# Patient Record
Sex: MALE | Race: WHITE | NOT HISPANIC OR LATINO | Employment: OTHER | URBAN - METROPOLITAN AREA
[De-identification: names, ages, dates, MRNs, and addresses within clinical notes are randomized per-mention and may not be internally consistent; named-entity substitution may affect disease eponyms.]

---

## 2017-11-10 ENCOUNTER — OFFICE VISIT (OUTPATIENT)
Dept: NEUROLOGY | Facility: MEDICAL CENTER | Age: 62
End: 2017-11-10
Payer: COMMERCIAL

## 2017-11-10 VITALS
RESPIRATION RATE: 16 BRPM | OXYGEN SATURATION: 93 % | WEIGHT: 264.2 LBS | HEART RATE: 72 BPM | SYSTOLIC BLOOD PRESSURE: 148 MMHG | HEIGHT: 68 IN | DIASTOLIC BLOOD PRESSURE: 90 MMHG | BODY MASS INDEX: 40.04 KG/M2

## 2017-11-10 DIAGNOSIS — R41.3 MEMORY PROBLEM: ICD-10-CM

## 2017-11-10 DIAGNOSIS — I10 ESSENTIAL HYPERTENSION: ICD-10-CM

## 2017-11-10 DIAGNOSIS — R68.89 SPELLS OF DECREASED ATTENTIVENESS: ICD-10-CM

## 2017-11-10 PROCEDURE — 99204 OFFICE O/P NEW MOD 45 MIN: CPT | Performed by: PSYCHIATRY & NEUROLOGY

## 2017-11-10 ASSESSMENT — PATIENT HEALTH QUESTIONNAIRE - PHQ9
SUM OF ALL RESPONSES TO PHQ QUESTIONS 1-9: 13
5. POOR APPETITE OR OVEREATING: 2 - MORE THAN HALF THE DAYS
CLINICAL INTERPRETATION OF PHQ2 SCORE: 3

## 2017-11-10 NOTE — PATIENT INSTRUCTIONS
Registration 3/3  Recall 2/3  Fluctuating of memory problems    IMPRESSION:    1. Poor memory for 15 years, progressively worsened  2. Spells of TIA like symptoms 1990+      PLAN/RECOMMENDATIONS:      We will offer MRI of brain , EEG and blood tests to look for the causes of memory problems  Such as subtle seizures      SIGNATURE:  oJse Gaming

## 2017-11-10 NOTE — PROGRESS NOTES
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  Progressive memory problems in the past 15 years   Chief Complaint   Patient presents with   • Establish Care     Memory issues       PRESENT ILLNESS:   1988-- Knee surgery-- worked in the assisted, fighting with a prisoner  After surgery, he noticed that his memory was poor-- could not read books without reading  TIA symptoms in mid 1990 -- fuzzy, eyes out of focus-- minutes to come out of it-- few times in a couple of weeks  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        PAST MEDICAL HISTORY:  Past Medical History:   Diagnosis Date   • Anesthesia     slow to wake up; ?apneic, needs encourangement to breathe   • High cholesterol    • Hyperlipidemia    • Hypertension    • Sleep apnea 2015    unable to use bipap right now due to sinus issue       PAST SURGICAL HISTORY:  Past Surgical History:   Procedure Laterality Date   • UMBILICAL HERNIA REPAIR  2/23/2015    Performed by John H Ganser, M.D. at [facility]   • OTHER ORTHOPEDIC SURGERY  2009    Right Knee Repair   • OTHER ORTHOPEDIC SURGERY  1988    Right Meniscus   • [id number]     • NASAL RECONSTRUCTION     •  "TONSILLECTOMY         FAMILY HISTORY:  Family History   Problem Relation Age of Onset   • Heart Attack Father    • Heart Disease Father        SOCIAL HISTORY:  Social History     Social History   • Marital status:      Spouse name: N/A   • Number of children: N/A   • Years of education: N/A     Occupational History   • Not on file.     Social History Main Topics   • Smoking status: Never Smoker   • Smokeless tobacco: Not on file   • Alcohol use Yes      Comment: 0-1 glasses of wine a week   • Drug use: No   • Sexual activity: Not on file     Other Topics Concern   • Not on file     Social History Narrative   • No narrative on file     ALLERGIES:  Allergies   Allergen Reactions   • Crestor [Rosuvastatin Calcium]      Severe side effects   • Hydrocodone      Gives patient insomnia, makes him \"crazy\"     TOBHX  History   Smoking Status   • Never Smoker   Smokeless Tobacco   • Not on file     ALCHX  History   Alcohol Use   • Yes     Comment: 0-1 glasses of wine a week     DRUGHX  History   Drug Use No           MEDICATIONS:  Current Outpatient Prescriptions   Medication   • oxycodone-acetaminophen (PERCOCET) 5-325 MG TABS   • levofloxacin (LEVAQUIN) 500 MG tablet   • omeprazole (PRILOSEC) 20 MG delayed-release capsule   • lisinopril (PRINIVIL) 10 MG TABS   • predniSONE (DELTASONE) 10 MG TABS   • montelukast (SINGULAIR) 10 MG TABS   • naproxen (NAPROSYN) 500 MG TABS   • niacin (NIASPAN) 1000 MG CR tablet   • Multiple Vitamin (MULTI VITAMIN MENS) TABS   • aspirin buffered (ASCRIPTIN) 325 MG TABS     No current facility-administered medications for this visit.        REVIEW OF SYSTEM:    Constitutional: Denies fevers, Denies weight changes   Eyes: Denies changes in vision, no eye pain   Ears/Nose/Throat/Mouth: Denies nasal congestion or sore throat   Cardiovascular: Denies chest pain or palpitations   Respiratory: Denies SOB.   Gastrointestinal/Hepatic: Denies abdominal pain, nausea, vomiting, diarrhea, constipation " "or GI bleeding   Genitourinary: Denies bladder dysfunction, dysuria or frequency   Musculoskeletal/Rheum: Denies joint pain and swelling   Skin/Breast: Denies rash, denies breast lumps or discharge   Neurological: TIA like spells, poor memory  Psychiatric: denies mood disorder   Endocrine: denies hx of diabetes or thyroid dysfunction   Heme/Oncology/Lymph Nodes: Denies enlarged lymph nodes, denies brusing or known bleeding disorder   Allergic/Immunologic: Denies hx of allergies         PHYSICAL AND NEUROLOGICAL EXMAINATIONS:  VITAL SIGNS: /90   Pulse 72   Resp 16   Ht 1.727 m (5' 8\")   Wt 119.8 kg (264 lb 3.2 oz)   SpO2 93%   BMI 40.17 kg/m²   CURRENT WEIGHT:   BMI: Body mass index is 40.17 kg/m².  PREVIOUS WEIGHTS:  Wt Readings from Last 25 Encounters:   11/10/17 119.8 kg (264 lb 3.2 oz)   02/23/15 108.9 kg (240 lb 1.3 oz)   01/31/13 116.6 kg (257 lb)   01/19/13 115 kg (253 lb 9.6 oz)   12/28/12 121.6 kg (268 lb)       General appearance of patient: WDWN(+) NAD(+)    EYES  o Fundus : Papilledem(-) Exudates(-) Hemorrhage(-)  Nervous System  Orientation to time, place and person(+)  Memory normal(-)  Language: aphasia(-)  Knowledge: past(+) Current(+)  Attention(+)  Cranial Nerves  • Nerve 2: intact  • Nerve 3,4,6: intact  • Nerve 5 : intact  • Nerve 7: intact  • Nerve 8: intact  • Nerve 9 & 10: intact  • Nerve 11: intact  • Nerve 12: intact  Muscle Power and muscle tone: symmetric, normal in upper and lower  Sensory System: Pin sensation intact(+)  Reflexes: symmetric throughout  Cerebellar Function FNP normal   Gait : Steady(+) TandemGait steady(+)  Heart and Vascular  Peripheral Vasucular system : Edema (-) Swelling(-)  RHB, Breathing sound clear  abdomen bowel sound normoactive  Extremities freely moveable  Joints no contracture       NEUROIMAGING: I reviewed the MRI/CT of brain     Registration 3/3  Recall 2/3  Fluctuating of memory problems    IMPRESSION:    1. Poor memory for 15 years, " progressively worsened  2. Spells of TIA like symptoms 1990+      PLAN/RECOMMENDATIONS:      We will offer MRI of brain , EEG and blood tests to look for the causes of memory problems  Such as subtle seizures      SIGNATURE:  Jose Gaming

## 2017-11-29 ENCOUNTER — HOSPITAL ENCOUNTER (OUTPATIENT)
Dept: RADIOLOGY | Facility: MEDICAL CENTER | Age: 62
End: 2017-11-29
Attending: PSYCHIATRY & NEUROLOGY
Payer: COMMERCIAL

## 2017-11-29 DIAGNOSIS — I10 ESSENTIAL HYPERTENSION: ICD-10-CM

## 2017-11-29 DIAGNOSIS — R41.3 MEMORY PROBLEM: ICD-10-CM

## 2017-11-29 DIAGNOSIS — R68.89 SPELLS OF DECREASED ATTENTIVENESS: ICD-10-CM

## 2017-11-29 PROCEDURE — 70551 MRI BRAIN STEM W/O DYE: CPT

## 2018-02-07 ENCOUNTER — HOSPITAL ENCOUNTER (OUTPATIENT)
Dept: LAB | Facility: MEDICAL CENTER | Age: 63
End: 2018-02-07
Attending: PSYCHIATRY & NEUROLOGY
Payer: COMMERCIAL

## 2018-02-07 DIAGNOSIS — R41.3 MEMORY PROBLEM: ICD-10-CM

## 2018-02-07 DIAGNOSIS — I10 ESSENTIAL HYPERTENSION: ICD-10-CM

## 2018-02-07 DIAGNOSIS — R68.89 SPELLS OF DECREASED ATTENTIVENESS: ICD-10-CM

## 2018-02-07 LAB
25(OH)D3 SERPL-MCNC: 11 NG/ML (ref 30–100)
CRP SERPL HS-MCNC: 0.17 MG/DL (ref 0–0.75)
ERYTHROCYTE [SEDIMENTATION RATE] IN BLOOD BY WESTERGREN METHOD: 16 MM/HOUR (ref 0–20)
RHEUMATOID FACT SER IA-ACNC: <10 IU/ML (ref 0–14)
THYROPEROXIDASE AB SERPL-ACNC: 1.4 IU/ML (ref 0–9)
VIT B12 SERPL-MCNC: 317 PG/ML (ref 211–911)

## 2018-02-07 PROCEDURE — 86225 DNA ANTIBODY NATIVE: CPT

## 2018-02-07 PROCEDURE — 86038 ANTINUCLEAR ANTIBODIES: CPT

## 2018-02-07 PROCEDURE — 82306 VITAMIN D 25 HYDROXY: CPT

## 2018-02-07 PROCEDURE — 36415 COLL VENOUS BLD VENIPUNCTURE: CPT

## 2018-02-07 PROCEDURE — 86140 C-REACTIVE PROTEIN: CPT

## 2018-02-07 PROCEDURE — 86800 THYROGLOBULIN ANTIBODY: CPT

## 2018-02-07 PROCEDURE — 82607 VITAMIN B-12: CPT

## 2018-02-07 PROCEDURE — 86235 NUCLEAR ANTIGEN ANTIBODY: CPT

## 2018-02-07 PROCEDURE — 83516 IMMUNOASSAY NONANTIBODY: CPT | Mod: 91

## 2018-02-07 PROCEDURE — 85652 RBC SED RATE AUTOMATED: CPT

## 2018-02-07 PROCEDURE — 86376 MICROSOMAL ANTIBODY EACH: CPT

## 2018-02-07 PROCEDURE — 86431 RHEUMATOID FACTOR QUANT: CPT

## 2018-02-08 LAB
CENTROMERE IGG TITR SER IF: 1 AU/ML (ref 0–40)
ENA JO1 AB TITR SER: 1 AU/ML (ref 0–40)
ENA SCL70 IGG SER QL: 13 AU/ML (ref 0–40)
ENA SM IGG SER-ACNC: 0 AU/ML (ref 0–40)
ENA SS-B IGG SER IA-ACNC: 0 AU/ML (ref 0–40)
NUCLEAR IGG SER QL IA: NORMAL
RIBOSOMAL P AB SER-ACNC: 5 AU/ML (ref 0–40)
SSA52 R0ENA AB IGG Q0420: 16 AU/ML (ref 0–40)
SSA60 R0ENA AB IGG Q0419: 2 AU/ML (ref 0–40)
U1 SNRNP IGG SER QL: 0 AU/ML (ref 0–40)

## 2018-02-09 LAB — THYROGLOB AB SERPL-ACNC: <0.9 IU/ML (ref 0–4)

## 2018-02-20 ENCOUNTER — TELEPHONE (OUTPATIENT)
Dept: NEUROLOGY | Facility: MEDICAL CENTER | Age: 63
End: 2018-02-20

## 2018-02-20 NOTE — TELEPHONE ENCOUNTER
Results for ANANDA KAUR (MRN [id number]) as of 2/20/2018 15:19   Ref. Range 2/7/2018 10:48   25-Hydroxy   Vitamin D 25 Latest Ref Range: 30 - 100 ng/mL 11 (L)       Due to Vit D deficiency, please take vit D-3   4000unit    daily

## 2018-03-07 ENCOUNTER — NON-PROVIDER VISIT (OUTPATIENT)
Dept: NEUROLOGY | Facility: MEDICAL CENTER | Age: 63
End: 2018-03-07
Payer: COMMERCIAL

## 2018-03-07 DIAGNOSIS — I10 ESSENTIAL HYPERTENSION: ICD-10-CM

## 2018-03-07 DIAGNOSIS — R68.89 SPELLS OF DECREASED ATTENTIVENESS: ICD-10-CM

## 2018-03-07 DIAGNOSIS — R41.3 MEMORY PROBLEM: ICD-10-CM

## 2018-03-07 PROCEDURE — 95819 EEG AWAKE AND ASLEEP: CPT | Performed by: PSYCHIATRY & NEUROLOGY

## 2018-03-12 ENCOUNTER — OFFICE VISIT (OUTPATIENT)
Dept: NEUROLOGY | Facility: MEDICAL CENTER | Age: 63
End: 2018-03-12
Payer: COMMERCIAL

## 2018-03-12 VITALS
WEIGHT: 267.86 LBS | RESPIRATION RATE: 16 BRPM | HEIGHT: 68 IN | DIASTOLIC BLOOD PRESSURE: 66 MMHG | SYSTOLIC BLOOD PRESSURE: 128 MMHG | TEMPERATURE: 98.1 F | BODY MASS INDEX: 40.6 KG/M2 | HEART RATE: 78 BPM | OXYGEN SATURATION: 93 %

## 2018-03-12 DIAGNOSIS — R68.89 SPELLS OF DECREASED ATTENTIVENESS: ICD-10-CM

## 2018-03-12 DIAGNOSIS — R41.3 MEMORY PROBLEM: ICD-10-CM

## 2018-03-12 PROCEDURE — 99214 OFFICE O/P EST MOD 30 MIN: CPT | Performed by: PSYCHIATRY & NEUROLOGY

## 2018-03-12 RX ORDER — LISINOPRIL 20 MG/1
40 TABLET ORAL DAILY
COMMUNITY
End: 2022-03-30

## 2018-03-12 RX ORDER — MEMANTINE HYDROCHLORIDE 5 MG/1
5 TABLET ORAL 2 TIMES DAILY
Qty: 60 TAB | Refills: 6 | Status: SHIPPED | OUTPATIENT
Start: 2018-03-12 | End: 2018-06-15 | Stop reason: SDUPTHER

## 2018-03-12 ASSESSMENT — PATIENT HEALTH QUESTIONNAIRE - PHQ9
CLINICAL INTERPRETATION OF PHQ2 SCORE: 3
SUM OF ALL RESPONSES TO PHQ QUESTIONS 1-9: 12
5. POOR APPETITE OR OVEREATING: 3 - NEARLY EVERY DAY

## 2018-03-12 NOTE — PROGRESS NOTES
ROUTINE ELECTROENCEPHALOGRAM REPORT      NAME: Yadiel Moody    REFERRING Dr:    INDICATION: 1. Poor memory for 15 years, progressively worsened  2. Spells of TIA like symptoms 1990+      TECHNIQUE: 30 channel routine electroencephalogram (EEG) was performed in accordance with the international 10-20 system. The study was reviewed in bipolar and referential montages. The recording examined the patient during wakeful and drowsy state(s).     DESCRIPTION OF THE RECORD:      Background rhythm during awake stage shows well-organized, well-developed, average voltage 10 to 11 hertz alpha activity in the posterior regions.  It blocks with eye opening and it is bilaterally synchronous and symmetrical.  No spike-and-wave discharges but there are delta/blunted sharp bursts more over left.  Photic stimulation did not produce any abnormalities. Stage II sleep was achieved.      ACTIVATION PROCEDURES:      Photic Stimulation were done    ICTAL AND/OR INTERICTAL FINDINGS:    No focal or generalized epileptiform activity noted.  No spike-and-wave discharges but there are delta/blunted sharp bursts more over left..  No clinical events or seizures were reported or recorded during the study.      EKG: sampling of the EKG recording demonstrated sinus rhythm.        INTERPRETATION:      ________________________________________________________________________    This scalp EEG is consistent with mild focal cortical dysfunction more over the left     This remains nonspecfic     If clinical suspicion of seizure remains high.  Prolonged outpatient EEG   monitoring may be of help.        ________________________________________________________________________

## 2018-03-12 NOTE — PROGRESS NOTES
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  Progressive memory problems in the past 15 years   Chief Complaint   Patient presents with   • Establish Care     Essntial hypertenson       PRESENT ILLNESS:   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-- 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, fighting with a prisoner  After surgery, he noticed that his memory was poor-- could not read books without reading  TIA symptoms in mid 1990 -- fuzzy, eyes out of focus-- minutes to come out of it-- few times in a couple of weeks  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        PAST MEDICAL HISTORY:  Past Medical History:   Diagnosis Date   • Anesthesia     slow to wake up; ?apneic, needs encourangement to breathe   • High cholesterol    • Hyperlipidemia    • Hypertension    • Sleep apnea 2015    unable to use bipap right now due to sinus issue       PAST SURGICAL HISTORY:  Past Surgical History:   Procedure Laterality Date   • UMBILICAL HERNIA REPAIR  2/23/2015    Performed by John H Ganser, M.D. at [facility]   • OTHER ORTHOPEDIC SURGERY  2009    Right Knee Repair   • OTHER ORTHOPEDIC SURGERY  1988    Right Meniscus   • [id number]     • NASAL RECONSTRUCTION     •  "TONSILLECTOMY         FAMILY HISTORY:  Family History   Problem Relation Age of Onset   • Heart Attack Father    • Heart Disease Father        SOCIAL HISTORY:  Social History     Social History   • Marital status:      Spouse name: N/A   • Number of children: N/A   • Years of education: N/A     Occupational History   • Not on file.     Social History Main Topics   • Smoking status: Never Smoker   • Smokeless tobacco: Not on file   • Alcohol use Yes      Comment: 0-1 glasses of wine a week   • Drug use: No   • Sexual activity: Not on file     Other Topics Concern   • Not on file     Social History Narrative   • No narrative on file     ALLERGIES:  Allergies   Allergen Reactions   • Crestor [Rosuvastatin Calcium]      Severe side effects   • Hydrocodone      Gives patient insomnia, makes him \"crazy\"     TOBHX  History   Smoking Status   • Never Smoker   Smokeless Tobacco   • Not on file     ALCHX  History   Alcohol Use   • Yes     Comment: 0-1 glasses of wine a week     DRUGHX  History   Drug Use No           MEDICATIONS:  Current Outpatient Prescriptions   Medication   • lisinopril (PRINIVIL) 20 MG Tab     No current facility-administered medications for this visit.        REVIEW OF SYSTEM:    Constitutional: Denies fevers, Denies weight changes   Eyes: Denies changes in vision, no eye pain   Ears/Nose/Throat/Mouth: Denies nasal congestion or sore throat   Cardiovascular: Denies chest pain or palpitations   Respiratory: Denies SOB.   Gastrointestinal/Hepatic: Denies abdominal pain, nausea, vomiting, diarrhea, constipation or GI bleeding   Genitourinary: Denies bladder dysfunction, dysuria or frequency   Musculoskeletal/Rheum: Denies joint pain and swelling   Skin/Breast: Denies rash, denies breast lumps or discharge   Neurological: TIA like spells, poor memory  Psychiatric: denies mood disorder   Endocrine: denies hx of diabetes or thyroid dysfunction   Heme/Oncology/Lymph Nodes: Denies enlarged lymph nodes, " "denies brusing or known bleeding disorder   Allergic/Immunologic: Denies hx of allergies         PHYSICAL AND NEUROLOGICAL EXMAINATIONS:  VITAL SIGNS: /66   Pulse 78   Temp 36.7 °C (98.1 °F)   Resp 16   Ht 1.727 m (5' 8\")   Wt 121.5 kg (267 lb 13.7 oz)   SpO2 93%   BMI 40.73 kg/m²   CURRENT WEIGHT:   BMI: Body mass index is 40.73 kg/m².  PREVIOUS WEIGHTS:  Wt Readings from Last 25 Encounters:   03/12/18 121.5 kg (267 lb 13.7 oz)   11/10/17 119.8 kg (264 lb 3.2 oz)   02/23/15 108.9 kg (240 lb 1.3 oz)   01/31/13 116.6 kg (257 lb)   01/19/13 115 kg (253 lb 9.6 oz)   12/28/12 121.6 kg (268 lb)       General appearance of patient: WDWN(+) NAD(+)    EYES  o Fundus : Papilledem(-) Exudates(-) Hemorrhage(-)  Nervous System  Orientation to time, place and person(+)  Memory normal(-)  Language: aphasia(-)  Knowledge: past(+) Current(+)  Attention(+)  Cranial Nerves  • Nerve 2: intact  • Nerve 3,4,6: intact  • Nerve 5 : intact  • Nerve 7: intact  • Nerve 8: intact  • Nerve 9 & 10: intact  • Nerve 11: intact  • Nerve 12: intact  Muscle Power and muscle tone: symmetric, normal in upper and lower  Sensory System: Pin sensation intact(+)  Reflexes: symmetric throughout  Cerebellar Function FNP normal   Gait : Steady(+) TandemGait steady(+)  Heart and Vascular  Peripheral Vasucular system : Edema (-) Swelling(-)  RHB, Breathing sound clear  abdomen bowel sound normoactive  Extremities freely moveable  Joints no contracture       NEUROIMAGING: I reviewed the MRI/CT of brain     Registration 3/3  Recall 2/3  Fluctuating of memory problems    IMPRESSION:    1. Poor memory for 15 years, progressively worsened  2. Spells of TIA like symptoms 1990+  3. Vit D deficiency      PLAN/RECOMMENDATIONS:    Based on the clinical presentation, MRI and EEG ( normal hippocampus and mild focal EEG abnormalities)    It is reasonable to try medication for memory problems and medication to regulate the brain activities    First of all, we " will try    Namenda 5mg two times per day for memory    The other medicine we may add are Aricept, Lamictal, etc    The rationale is to improve the memory and regulate the brain waves abnormalities        ________________________________________________________________________        Exercise muscle and brain    Weight loss    Quit alcohol altogether    Could try fasting 12 hours every other day    Treat sleep apnea--- re-try CPAP machine    Playing wind instruments might help the snoring ( strength the face/ neck muscles)    ________________________________________________________________________           We also discussed about the facial muscle exercise for snoring---  Like singing, like playing wind instruments like the following       ________________________________________________________________________         Reduce anxiety by praying, yoga, meditation and etc  ________________________________________________________________________        ________________________________________________________________________    Fish Oil -- Omega 3 1000mg 3# daily    Try Daily Probiotic too    Vit D-3 4000 unit daily  ________________________________________________________________________              Results for NATASHAANANDA (MRN [id number]) as of 3/12/2018 10:01   Ref. Range 2/7/2018 10:48   25-Hydroxy   Vitamin D 25 Latest Ref Range: 30 - 100 ng/mL 11 (L)         We will offer MRI of brain , EEG and blood tests to look for the causes of memory problems  Such as subtle seizures      SIGNATURE:  Jose Gaming      11/2017 MRI of brain - not remarkable      INTERPRETATION OF EEG:       ________________________________________________________________________     This scalp EEG is consistent with mild focal cortical dysfunction more over the left      This remains nonspecfic     If clinical suspicion of seizure remains high.  Prolonged outpatient EEG   monitoring may be of  help.           ________________________________________________________________________

## 2018-03-12 NOTE — PROCEDURES
DATE OF SERVICE:  03/07/2018    This is an outpatient EEG done on 03/07/2018.    ROUTINE ELECTROENCEPHALOGRAM REPORT        NAME: CoalYadiel Brando     REFERRING Dr:     INDICATION: 1. Poor memory for 15 years, progressively worsened  2. Spells of TIA like symptoms 1990+        TECHNIQUE: 30 channel routine electroencephalogram (EEG) was performed in accordance with the international 10-20 system. The study was reviewed in bipolar and referential montages. The recording examined the patient during wakeful and drowsy state(s).      DESCRIPTION OF THE RECORD:        Background rhythm during awake stage shows well-organized, well-developed, average voltage 10 to 11 hertz alpha activity in the posterior regions.  It blocks with eye opening and it is bilaterally synchronous and symmetrical.  No spike-and-wave discharges but there are delta/blunted sharp bursts more over left.  Photic stimulation did not produce any abnormalities. Stage II sleep was achieved.        ACTIVATION PROCEDURES:       Photic Stimulation were done     ICTAL AND/OR INTERICTAL FINDINGS:    No focal or generalized epileptiform activity noted.  No spike-and-wave discharges but there are delta/blunted sharp bursts more over left..  No clinical events or seizures were reported or recorded during the study.       EKG: sampling of the EKG recording demonstrated sinus rhythm.          INTERPRETATION:        ________________________________________________________________________     This scalp EEG is consistent with mild focal cortical dysfunction more over the left      This remains nonspecfic     If clinical suspicion of seizure remains high.  Prolonged outpatient EEG   monitoring may be of help.           ________________________________________________________________________                              ____________________________________     MD DIMAS BERNAL / JOCELINE    DD:  03/11/2018 23:26:29  DT:  03/11/2018 23:43:00    D#:  [id number]  Job#:   [id number]

## 2018-03-12 NOTE — PATIENT INSTRUCTIONS
IMPRESSION:    1. Poor memory for 15 years, progressively worsened  2. Spells of TIA like symptoms 1990+  3. Vit D deficiency      PLAN/RECOMMENDATIONS:    Based on the clinical presentation, MRI and EEG ( normal hippocampus and mild focal EEG abnormalities)    It is reasonable to try medication for memory problems and medication to regulate the brain activities    First of all, we will try    Namenda 5mg two times per day for memory    The other medicine we may add are Aricept, Lamictal, etc    The rationale is to improve the memory and regulate the brain waves abnormalities        ________________________________________________________________________        Exercise muscle and brain    Weight loss    Quit alcohol altogether    Could try fasting 12 hours every other day    Treat sleep apnea--- re-try CPAP machine    Playing wind instruments might help the snoring ( strength the face/ neck muscles)    ________________________________________________________________________           We also discussed about the facial muscle exercise for snoring---  Like singing, like playing wind instruments like the following       ________________________________________________________________________         Reduce anxiety by praying, yoga, meditation and etc  ________________________________________________________________________        ________________________________________________________________________    Fish Oil -- Omega 3 1000mg 3# daily    Try Daily Probiotic too    Vit D-3 4000 unit daily  ________________________________________________________________________              Results for ANANDA KAUR (MRN [id number]) as of 3/12/2018 10:01   Ref. Range 2/7/2018 10:48   25-Hydroxy   Vitamin D 25 Latest Ref Range: 30 - 100 ng/mL 11 (L)         We will offer MRI of brain , EEG and blood tests to look for the causes of memory problems  Such as subtle seizures      SIGNATURE:  Jose Gaming      11/2017 MRI of brain -  not remarkable      INTERPRETATION OF EEG:       ________________________________________________________________________     This scalp EEG is consistent with mild focal cortical dysfunction more over the left      This remains nonspecfic     If clinical suspicion of seizure remains high.  Prolonged outpatient EEG   monitoring may be of help.           ________________________________________________________________________

## 2018-06-18 NOTE — TELEPHONE ENCOUNTER
Called 90 day supply  into [facility] at patient request. Left message he will need follow up before next refill.

## 2018-06-19 RX ORDER — MEMANTINE HYDROCHLORIDE 5 MG/1
TABLET ORAL
Qty: 180 TAB | Refills: 0 | Status: SHIPPED | OUTPATIENT
Start: 2018-06-19 | End: 2018-10-15 | Stop reason: SDUPTHER

## 2018-10-15 ENCOUNTER — OFFICE VISIT (OUTPATIENT)
Dept: NEUROLOGY | Facility: MEDICAL CENTER | Age: 63
End: 2018-10-15
Payer: COMMERCIAL

## 2018-10-15 VITALS
DIASTOLIC BLOOD PRESSURE: 82 MMHG | HEART RATE: 84 BPM | WEIGHT: 262 LBS | SYSTOLIC BLOOD PRESSURE: 130 MMHG | OXYGEN SATURATION: 92 % | TEMPERATURE: 98.6 F | BODY MASS INDEX: 39.71 KG/M2 | HEIGHT: 68 IN

## 2018-10-15 DIAGNOSIS — R41.3 MEMORY PROBLEM: ICD-10-CM

## 2018-10-15 DIAGNOSIS — R68.89 SPELLS OF DECREASED ATTENTIVENESS: ICD-10-CM

## 2018-10-15 PROCEDURE — 99214 OFFICE O/P EST MOD 30 MIN: CPT | Performed by: PSYCHIATRY & NEUROLOGY

## 2018-10-15 RX ORDER — OMEPRAZOLE 20 MG/1
20 CAPSULE, DELAYED RELEASE ORAL DAILY
COMMUNITY

## 2018-10-15 RX ORDER — LAMOTRIGINE 25 MG/1
25 TABLET ORAL 2 TIMES DAILY
Qty: 60 TAB | Refills: 5 | Status: SHIPPED | OUTPATIENT
Start: 2018-10-15 | End: 2018-10-15 | Stop reason: SDUPTHER

## 2018-10-15 RX ORDER — MEMANTINE HYDROCHLORIDE 10 MG/1
10 TABLET ORAL 2 TIMES DAILY
Qty: 180 TAB | Refills: 1 | Status: SHIPPED | OUTPATIENT
Start: 2018-10-15 | End: 2019-04-17 | Stop reason: SDUPTHER

## 2018-10-15 NOTE — PROGRESS NOTES
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 [employer]  Progressive memory problems in the past 15 years   Chief Complaint   Patient presents with   • Follow-Up     memory issues       PRESENT ILLNESS:   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-- Knee surgery-- worked in the assisted, fighting with a prisoner  After surgery, he noticed that his memory was poor-- could not understand books without reading multiple times  TIA symptoms in mid 1990 -- fuzzy, eyes out of focus-- minutes to come out of it-- few times in a couple of weeks  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        PAST MEDICAL HISTORY:  Past Medical History:   Diagnosis Date   • Anesthesia     slow to wake up; ?apneic, needs encourangement to breathe   • High cholesterol    • Hyperlipidemia    • Hypertension    • Sleep apnea 2015    unable to use bipap right now due to sinus issue       PAST SURGICAL HISTORY:  Past Surgical History:   Procedure Laterality Date   • UMBILICAL HERNIA REPAIR  2/23/2015    Performed by John H Ganser, M.D. at [facility]   • OTHER ORTHOPEDIC SURGERY  2009    Right Knee Repair   • OTHER ORTHOPEDIC SURGERY  1988    Right Meniscus   • [id number]     •  "NASAL RECONSTRUCTION     • TONSILLECTOMY         FAMILY HISTORY:  Family History   Problem Relation Age of Onset   • Heart Attack Father    • Heart Disease Father        SOCIAL HISTORY:  Social History     Social History   • Marital status:      Spouse name: N/A   • Number of children: N/A   • Years of education: N/A     Occupational History   • Not on file.     Social History Main Topics   • Smoking status: Never Smoker   • Smokeless tobacco: Never Used   • Alcohol use Yes      Comment: 0-1 glasses of wine a week   • Drug use: No   • Sexual activity: Not on file     Other Topics Concern   • Not on file     Social History Narrative   • No narrative on file     ALLERGIES:  Allergies   Allergen Reactions   • Crestor [Rosuvastatin Calcium]      Severe side effects   • Hydrocodone      Gives patient insomnia, makes him \"crazy\"     TOBHX  History   Smoking Status   • Never Smoker   Smokeless Tobacco   • Never Used     ALCHX  History   Alcohol Use   • Yes     Comment: 0-1 glasses of wine a week     DRUGHX  History   Drug Use No           MEDICATIONS:  Current Outpatient Prescriptions   Medication   • omeprazole (PRILOSEC) 20 MG delayed-release capsule   • vitamin D (CHOLECALCIFEROL) 1000 UNIT Tab   • memantine (NAMENDA) 5 MG Tab   • lisinopril (PRINIVIL) 20 MG Tab     No current facility-administered medications for this visit.        REVIEW OF SYSTEM:    Constitutional: Denies fevers, Denies weight changes   Eyes: Denies changes in vision, no eye pain   Ears/Nose/Throat/Mouth: Denies nasal congestion or sore throat   Cardiovascular: Denies chest pain or palpitations   Respiratory: Denies SOB.   Gastrointestinal/Hepatic: Denies abdominal pain, nausea, vomiting, diarrhea, constipation or GI bleeding   Genitourinary: Denies bladder dysfunction, dysuria or frequency   Musculoskeletal/Rheum: Denies joint pain and swelling   Skin/Breast: Denies rash, denies breast lumps or discharge   Neurological: TIA like spells, poor " "memory  Psychiatric: denies mood disorder   Endocrine: denies hx of diabetes or thyroid dysfunction   Heme/Oncology/Lymph Nodes: Denies enlarged lymph nodes, denies brusing or known bleeding disorder   Allergic/Immunologic: Denies hx of allergies         PHYSICAL AND NEUROLOGICAL EXMAINATIONS:  VITAL SIGNS: /82 (BP Location: Left arm, Patient Position: Sitting, BP Cuff Size: Adult)   Pulse 84   Temp 37 °C (98.6 °F) (Temporal)   Ht 1.727 m (5' 8\")   Wt 118.8 kg (262 lb)   SpO2 92%   BMI 39.84 kg/m²   CURRENT WEIGHT:   BMI: Body mass index is 39.84 kg/m².  PREVIOUS WEIGHTS:  Wt Readings from Last 25 Encounters:   10/15/18 118.8 kg (262 lb)   03/12/18 121.5 kg (267 lb 13.7 oz)   11/10/17 119.8 kg (264 lb 3.2 oz)   02/23/15 108.9 kg (240 lb 1.3 oz)   01/31/13 116.6 kg (257 lb)   01/19/13 115 kg (253 lb 9.6 oz)   12/28/12 121.6 kg (268 lb)       General appearance of patient: WDWN(+) NAD(+)    EYES  o Fundus : Papilledem(-) Exudates(-) Hemorrhage(-)  Nervous System  Orientation to time, place and person(+)  Memory normal(-)      ________________________________________________________________________      We also asked the patient to copy the pentagons, draw clocks, writing  The patient's work will be scanned into the media section    ________________________________________________________________________    Language: aphasia(-)  Knowledge: past(+) Current(+)  Attention(+)  Cranial Nerves  • Nerve 2: intact  • Nerve 3,4,6: intact  • Nerve 5 : intact  • Nerve 7: intact  • Nerve 8: intact  • Nerve 9 & 10: intact  • Nerve 11: intact  • Nerve 12: intact  Muscle Power and muscle tone: symmetric, normal in upper and lower  Sensory System: Pin sensation intact(+)  Reflexes: symmetric throughout  Cerebellar Function FNP normal   Gait : Steady(+) TandemGait steady(+)  Heart and Vascular  Peripheral Vasucular system : Edema (-) Swelling(-)  RHB, Breathing sound clear  abdomen bowel sound normoactive  Extremities " freely moveable  Joints no contracture       NEUROIMAGING: I reviewed the MRI/CT of brain     Registration 3/3  Recall 3/3  Fluctuating of memory problems    IMPRESSION:    1. Poor memory since 2003, progressively worsened  2. Spells of TIA like symptoms 1990+  3. Vit D deficiency  4. Sleep Apnea--  Could Not tolerate BiPaP      PLAN/RECOMMENDATIONS:    Based on the clinical presentation, MRI and EEG ( normal hippocampus and mild focal EEG abnormalities)    ________________________________________________________________________      Up the Namenda 10mg two times per day for memory  This time, we will add Lamictal   Lamictal 25mg daily for 2 weeks, then up to 25mg two times per day since the third week  Please stop if any side effects  Please notify us if not helpful    The rationale is to improve the memory and regulate the brain waves abnormalities  ________________________________________________________________________          ________________________________________________________________________        Exercise muscle and brain    Weight loss    Quit alcohol altogether    Could try fasting 12 hours every other day    Treat sleep apnea--- re-try CPAP machine? Could not tolerate CPAP  We also discuss about the depression issue  May benefit by talking with psychologist or psychiatrist    Playing wind instruments might help the snoring ( strength the face/ neck muscles)    ________________________________________________________________________         Reduce anxiety by praying, yoga, meditation and etc  ________________________________________________________________________          Results for ANANDA KAUR (MRN [id number]) as of 3/12/2018 10:01   Ref. Range 2/7/2018 10:48   25-Hydroxy   Vitamin D 25 Latest Ref Range: 30 - 100 ng/mL 11 (L)           SIGNATURE:  Jose Gaming      11/2017 MRI of brain - not remarkable      INTERPRETATION OF  EEG:       ________________________________________________________________________     This scalp EEG is consistent with mild focal cortical dysfunction more over the left      This remains nonspecfic     If clinical suspicion of seizure remains high.  Prolonged outpatient EEG   monitoring may be of help.           ________________________________________________________________________

## 2018-10-15 NOTE — PATIENT INSTRUCTIONS
IMPRESSION:    1. Poor memory since 2003, progressively worsened  2. Spells of TIA like symptoms 1990+  3. Vit D deficiency  4. Sleep Apnea--  Could Not tolerate BiPaP      PLAN/RECOMMENDATIONS:    Based on the clinical presentation, MRI and EEG ( normal hippocampus and mild focal EEG abnormalities)    ________________________________________________________________________      Up the Namenda 10mg two times per day for memory  This time, we will add Lamictal   Lamictal 25mg daily for 2 weeks, then up to 25mg two times per day since the third week  Please stop if any side effects  Please notify us if not helpful    The rationale is to improve the memory and regulate the brain waves abnormalities  ________________________________________________________________________          ________________________________________________________________________        Exercise muscle and brain    Weight loss    Quit alcohol altogether    Could try fasting 12 hours every other day    Treat sleep apnea--- re-try CPAP machine? Could not tolerate CPAP  We also discuss about the depression issue  May benefit by talking with psychologist or psychiatrist    Playing wind instruments might help the snoring ( strength the face/ neck muscles)    ________________________________________________________________________         Reduce anxiety by praying, yoga, meditation and etc  ________________________________________________________________________          Results for ANANDA KAUR (MRN [id number]) as of 3/12/2018 10:01   Ref. Range 2/7/2018 10:48   25-Hydroxy   Vitamin D 25 Latest Ref Range: 30 - 100 ng/mL 11 (L)           SIGNATURE:  Jose Gaming      11/2017 MRI of brain - not remarkable

## 2019-04-17 ENCOUNTER — OFFICE VISIT (OUTPATIENT)
Dept: NEUROLOGY | Facility: MEDICAL CENTER | Age: 64
End: 2019-04-17
Payer: COMMERCIAL

## 2019-04-17 VITALS
WEIGHT: 276 LBS | DIASTOLIC BLOOD PRESSURE: 66 MMHG | HEART RATE: 73 BPM | BODY MASS INDEX: 41.83 KG/M2 | OXYGEN SATURATION: 92 % | TEMPERATURE: 98.1 F | HEIGHT: 68 IN | SYSTOLIC BLOOD PRESSURE: 124 MMHG

## 2019-04-17 DIAGNOSIS — G47.33 OBSTRUCTIVE SLEEP APNEA SYNDROME: ICD-10-CM

## 2019-04-17 DIAGNOSIS — G47.39 OTHER SLEEP APNEA: ICD-10-CM

## 2019-04-17 DIAGNOSIS — R41.3 MEMORY PROBLEM: ICD-10-CM

## 2019-04-17 PROCEDURE — 99214 OFFICE O/P EST MOD 30 MIN: CPT | Performed by: PSYCHIATRY & NEUROLOGY

## 2019-04-17 RX ORDER — MEMANTINE HYDROCHLORIDE 10 MG/1
10 TABLET ORAL 2 TIMES DAILY
Qty: 180 TAB | Refills: 2 | Status: SHIPPED | OUTPATIENT
Start: 2019-04-17 | End: 2020-10-24 | Stop reason: SDUPTHER

## 2019-04-17 ASSESSMENT — PATIENT HEALTH QUESTIONNAIRE - PHQ9: CLINICAL INTERPRETATION OF PHQ2 SCORE: 0

## 2019-04-17 NOTE — PROGRESS NOTES
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  Progressive memory problems in the past 15 years     Overnight O2 study showed SAO2 as low as 62%  Chief Complaint   Patient presents with   • Follow-Up     Memory problem       PRESENT ILLNESS:   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, fighting with a prisoner  After surgery, he noticed that his memory was poor-- could not understand books without reading multiple times  TIA symptoms in mid 1990 -- fuzzy, eyes out of focus-- minutes to come out of it-- few times in a couple of weeks  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        PAST MEDICAL HISTORY:  Past Medical History:   Diagnosis Date   • Anesthesia     slow to wake up; ?apneic, needs encourangement to breathe   • High cholesterol    • Hyperlipidemia    • Hypertension    • Sleep apnea 2015    unable to use bipap right now due to sinus issue       PAST SURGICAL HISTORY:  Past Surgical History:   Procedure Laterality Date   • UMBILICAL HERNIA REPAIR  2/23/2015    Performed by John H Ganser, M.D. at [facility]   • OTHER ORTHOPEDIC SURGERY  2009  "   Right Knee Repair   • OTHER ORTHOPEDIC SURGERY  1988    Right Meniscus   • [id number]     • NASAL RECONSTRUCTION     • TONSILLECTOMY         FAMILY HISTORY:  Family History   Problem Relation Age of Onset   • Heart Attack Father    • Heart Disease Father        SOCIAL HISTORY:  Social History     Social History   • Marital status:      Spouse name: N/A   • Number of children: N/A   • Years of education: N/A     Occupational History   • Not on file.     Social History Main Topics   • Smoking status: Never Smoker   • Smokeless tobacco: Never Used   • Alcohol use Yes      Comment: 0-1 glasses of wine a week   • Drug use: No   • Sexual activity: Not on file     Other Topics Concern   • Not on file     Social History Narrative   • No narrative on file     ALLERGIES:  Allergies   Allergen Reactions   • Crestor [Rosuvastatin Calcium]      Severe side effects   • Hydrocodone      Gives patient insomnia, makes him \"crazy\"     TOBHX  History   Smoking Status   • Never Smoker   Smokeless Tobacco   • Never Used     ALCHX  History   Alcohol Use   • Yes     Comment: 0-1 glasses of wine a week     DRUGHX  History   Drug Use No           MEDICATIONS:  Current Outpatient Prescriptions   Medication   • lamoTRIgine (LAMICTAL) 25 MG Tab   • omeprazole (PRILOSEC) 20 MG delayed-release capsule   • vitamin D (CHOLECALCIFEROL) 1000 UNIT Tab   • memantine (NAMENDA) 10 MG Tab   • lisinopril (PRINIVIL) 20 MG Tab   • lamoTRIgine (LAMICTAL) 25 MG Tab     No current facility-administered medications for this visit.        REVIEW OF SYSTEM:    Constitutional: Denies fevers, Denies weight changes   Eyes: Denies changes in vision, no eye pain   Ears/Nose/Throat/Mouth: Denies nasal congestion or sore throat   Cardiovascular: Denies chest pain or palpitations   Respiratory: Denies SOB.   Gastrointestinal/Hepatic: Denies abdominal pain, nausea, vomiting, diarrhea, constipation or GI bleeding   Genitourinary: Denies bladder dysfunction, dysuria " "or frequency   Musculoskeletal/Rheum: Denies joint pain and swelling   Skin/Breast: Denies rash, denies breast lumps or discharge   Neurological: TIA like spells, poor memory  Psychiatric: denies mood disorder   Endocrine: denies hx of diabetes or thyroid dysfunction   Heme/Oncology/Lymph Nodes: Denies enlarged lymph nodes, denies brusing or known bleeding disorder   Allergic/Immunologic: Denies hx of allergies         PHYSICAL AND NEUROLOGICAL EXMAINATIONS:  VITAL SIGNS: /66   Pulse 73   Temp 36.7 °C (98.1 °F) (Temporal)   Ht 1.727 m (5' 8\")   Wt (!) 125.2 kg (276 lb)   SpO2 92%   BMI 41.97 kg/m²   CURRENT WEIGHT:   BMI: Body mass index is 41.97 kg/m².  PREVIOUS WEIGHTS:  Wt Readings from Last 25 Encounters:   04/17/19 (!) 125.2 kg (276 lb)   10/15/18 118.8 kg (262 lb)   03/12/18 121.5 kg (267 lb 13.7 oz)   11/10/17 119.8 kg (264 lb 3.2 oz)   02/23/15 108.9 kg (240 lb 1.3 oz)   01/31/13 116.6 kg (257 lb)   01/19/13 115 kg (253 lb 9.6 oz)   12/28/12 121.6 kg (268 lb)       General appearance of patient: WDWN(+) NAD(+)    EYES  o Fundus : Papilledem(-) Exudates(-) Hemorrhage(-)  Nervous System  Orientation to time, place and person(+)  Memory normal(-)      ________________________________________________________________________      We also asked the patient to copy the pentagons, draw clocks, writing  The patient's work will be scanned into the media section    ________________________________________________________________________    Language: aphasia(-)  Knowledge: past(+) Current(+)  Attention(+)  Cranial Nerves  • Nerve 2: intact  • Nerve 3,4,6: intact  • Nerve 5 : intact  • Nerve 7: intact  • Nerve 8: intact  • Nerve 9 & 10: intact  • Nerve 11: intact  • Nerve 12: intact  Muscle Power and muscle tone: symmetric, normal in upper and lower  Sensory System: Pin sensation intact(+)  Reflexes: symmetric throughout  Cerebellar Function FNP normal   Gait : Steady(+) TandemGait steady(+)  Heart and " Vascular  Peripheral Vasucular system : Edema (-) Swelling(-)  RHB, Breathing sound clear  abdomen bowel sound normoactive  Extremities freely moveable  Joints no contracture       NEUROIMAGING: I reviewed the MRI/CT of brain     Registration 3/3  Recall 3/3  Fluctuating of memory problems    IMPRESSION:    1. Poor memory since 2003, progressively worsened  2. Spells of TIA like symptoms 1990+  3. Vit D deficiency  4. Sleep Apnea--  Could Not tolerate BiPaP-- nocturnal O2 as low as 62%-- could not tolerate BiPaP because of sinus problems  5. Overweight      PLAN/RECOMMENDATIONS:    Based on the clinical presentation, MRI and EEG ( normal hippocampus and mild focal EEG abnormalities)--we could re-start trial of anti-seizure medication  But now, I would prefer to focus on sleep apnea and nocturnal hypoxia    We will refer the patient to pulmonologist for sleep apnea management  Nocturnal hypoxia is one of the cause of reversible memory problems  ________________________________________________________________________      Continue Namenda 10mg two times per day for memory  This time, we will stop Lamictal     ________________________________________________________________________      The patient has not talked to pulmonologist  The patient is going to see ENT  The patient got his ambulatory sleep study done in [facility]     ________________________________________________________________________        Exercise muscle and brain    Weight loss    Quit alcohol altogether    Could try fasting 12 hours every other day    Treat sleep apnea--- re-try CPAP machine? Could not tolerate CPAP  We also discuss about the depression issue  May benefit by talking with psychologist or psychiatrist    Playing wind instruments might help the snoring ( strength the face/ neck muscles)    ________________________________________________________________________         Reduce anxiety by praying, yoga, meditation  and etc  ________________________________________________________________________          Results for NATASHAANANDA FINCH (MRN [id number]) as of 3/12/2018 10:01   Ref. Range 2/7/2018 10:48   25-Hydroxy   Vitamin D 25 Latest Ref Range: 30 - 100 ng/mL 11 (L)           SIGNATURE:  Jose Gaming      11/2017 MRI of brain - not remarkable      INTERPRETATION OF EEG:       ________________________________________________________________________     This scalp EEG is consistent with mild focal cortical dysfunction more over the left      This remains nonspecfic     If clinical suspicion of seizure remains high.  Prolonged outpatient EEG   monitoring may be of help.           ________________________________________________________________________

## 2019-04-17 NOTE — PATIENT INSTRUCTIONS
IMPRESSION:    1. Poor memory since 2003, progressively worsened  2. Spells of TIA like symptoms 1990+  3. Vit D deficiency  4. Sleep Apnea--  Could Not tolerate BiPaP-- nocturnal O2 as low as 62%-- could not tolerate BiPaP because of sinus problems  5. Overweight      PLAN/RECOMMENDATIONS:    Based on the clinical presentation, MRI and EEG ( normal hippocampus and mild focal EEG abnormalities)--we could re-start trial of anti-seizure medication  But now, I would prefer to focus on sleep apnea and nocturnal hypoxia    We will refer the patient to pulmonologist for sleep apnea management  Nocturnal hypoxia is one of the cause of reversible memory problems  ________________________________________________________________________      Continue Namenda 10mg two times per day for memory  This time, we will stop Lamictal     ________________________________________________________________________      The patient has not talked to pulmonologist  The patient is going to see ENT  The patient got his ambulatory sleep study done in [facility]     ________________________________________________________________________        Exercise muscle and brain    Weight loss    Quit alcohol altogether    Could try fasting 12 hours every other day    Treat sleep apnea--- re-try CPAP machine? Could not tolerate CPAP  We also discuss about the depression issue  May benefit by talking with psychologist or psychiatrist    Playing wind instruments might help the snoring ( strength the face/ neck muscles)    ________________________________________________________________________         Reduce anxiety by praying, yoga, meditation and etc  ________________________________________________________________________

## 2019-06-26 ENCOUNTER — SLEEP CENTER VISIT (OUTPATIENT)
Dept: SLEEP MEDICINE | Facility: MEDICAL CENTER | Age: 64
End: 2019-06-26
Payer: COMMERCIAL

## 2019-06-26 VITALS
HEART RATE: 72 BPM | SYSTOLIC BLOOD PRESSURE: 124 MMHG | WEIGHT: 276 LBS | BODY MASS INDEX: 40.88 KG/M2 | HEIGHT: 69 IN | DIASTOLIC BLOOD PRESSURE: 82 MMHG | OXYGEN SATURATION: 92 % | RESPIRATION RATE: 15 BRPM

## 2019-06-26 DIAGNOSIS — G47.33 OSA (OBSTRUCTIVE SLEEP APNEA): ICD-10-CM

## 2019-06-26 DIAGNOSIS — E66.01 MORBID OBESITY WITH BMI OF 40.0-44.9, ADULT (HCC): ICD-10-CM

## 2019-06-26 PROCEDURE — 99203 OFFICE O/P NEW LOW 30 MIN: CPT | Performed by: FAMILY MEDICINE

## 2019-06-26 RX ORDER — EZETIMIBE 10 MG/1
TABLET ORAL
Refills: 5 | COMMUNITY
Start: 2019-06-15 | End: 2023-05-22

## 2019-06-26 NOTE — PATIENT INSTRUCTIONS
Sleep Apnea  Sleep apnea is a condition in which breathing pauses or becomes shallow during sleep. Episodes of sleep apnea usually last 10 seconds or longer, and they may occur as many as 20 times an hour. Sleep apnea disrupts your sleep and keeps your body from getting the rest that it needs. This condition can increase your risk of certain health problems, including:  · Heart attack.  · Stroke.  · Obesity.  · Diabetes.  · Heart failure.  · Irregular heartbeat.  There are three kinds of sleep apnea:  · Obstructive sleep apnea. This kind is caused by a blocked or collapsed airway.  · Central sleep apnea. This kind happens when the part of the brain that controls breathing does not send the correct signals to the muscles that control breathing.  · Mixed sleep apnea. This is a combination of obstructive and central sleep apnea.  What are the causes?  The most common cause of this condition is a collapsed or blocked airway. An airway can collapse or become blocked if:  · Your throat muscles are abnormally relaxed.  · Your tongue and tonsils are larger than normal.  · You are overweight.  · Your airway is smaller than normal.  What increases the risk?  This condition is more likely to develop in people who:  · Are overweight.  · Smoke.  · Have a smaller than normal airway.  · Are elderly.  · Are male.  · Drink alcohol.  · Take sedatives or tranquilizers.  · Have a family history of sleep apnea.  What are the signs or symptoms?  Symptoms of this condition include:  · Trouble staying asleep.  · Daytime sleepiness and tiredness.  · Irritability.  · Loud snoring.  · Morning headaches.  · Trouble concentrating.  · Forgetfulness.  · Decreased interest in sex.  · Unexplained sleepiness.  · Mood swings.  · Personality changes.  · Feelings of depression.  · Waking up often during the night to urinate.  · Dry mouth.  · Sore throat.  How is this diagnosed?  This condition may be diagnosed with:  · A medical history.  · A  physical exam.  · A series of tests that are done while you are sleeping (sleep study). These tests are usually done in a sleep lab, but they may also be done at home.  How is this treated?  Treatment for this condition aims to restore normal breathing and to ease symptoms during sleep. It may involve managing health issues that can affect breathing, such as high blood pressure or obesity. Treatment may include:  · Sleeping on your side.  · Using a decongestant if you have nasal congestion.  · Avoiding the use of depressants, including alcohol, sedatives, and narcotics.  · Losing weight if you are overweight.  · Making changes to your diet.  · Quitting smoking.  · Using a device to open your airway while you sleep, such as:  ¨ An oral appliance. This is a custom-made mouthpiece that shifts your lower jaw forward.  ¨ A continuous positive airway pressure (CPAP) device. This device delivers oxygen to your airway through a mask.  ¨ A nasal expiratory positive airway pressure (EPAP) device. This device has valves that you put into each nostril.  ¨ A bi-level positive airway pressure (BPAP) device. This device delivers oxygen to your airway through a mask.  · Surgery if other treatments do not work. During surgery, excess tissue is removed to create a wider airway.  It is important to get treatment for sleep apnea. Without treatment, this condition can lead to:  · High blood pressure.  · Coronary artery disease.  · (Men) An inability to achieve or maintain an erection (impotence).  · Reduced thinking abilities.  Follow these instructions at home:  · Make any lifestyle changes that your health care provider recommends.  · Eat a healthy, well-balanced diet.  · Take over-the-counter and prescription medicines only as told by your health care provider.  · Avoid using depressants, including alcohol, sedatives, and narcotics.  · Take steps to lose weight if you are overweight.  · If you were given a device to open your airway  while you sleep, use it only as told by your health care provider.  · Do not use any tobacco products, such as cigarettes, chewing tobacco, and e-cigarettes. If you need help quitting, ask your health care provider.  · Keep all follow-up visits as told by your health care provider. This is important.  Contact a health care provider if:  · The device that you received to open your airway during sleep is uncomfortable or does not seem to be working.  · Your symptoms do not improve.  · Your symptoms get worse.  Get help right away if:  · You develop chest pain.  · You develop shortness of breath.  · You develop discomfort in your back, arms, or stomach.  · You have trouble speaking.  · You have weakness on one side of your body.  · You have drooping in your face.  These symptoms may represent a serious problem that is an emergency. Do not wait to see if the symptoms will go away. Get medical help right away. Call your local emergency services (911 in the [place] Do not drive yourself to the hospital.   This information is not intended to replace advice given to you by your health care provider. Make sure you discuss any questions you have with your health care provider.  Document Released: 12/08/2003 Document Revised: 08/13/2017 Document Reviewed: 09/26/2016  [employer] Interactive Patient Education © 2017 [employer]      PLAN  - restart CPAP with full face mask. If issues with the CPAP or pressure stop by at sleep center for compliance download and pressure adjustment  - Discuss balloon sinuplasty with ENT  - Re establish care with the allergist

## 2019-06-26 NOTE — PROGRESS NOTES
"     [facility]  Consult Note     Date: 6/26/2019 / Time: 1:53 PM    Patient ID:   Name:             Yadiel Moody   YOB: 1955  Age:                 63 y.o.  male   MRN:               [id number]      Thank you for requesting a sleep medicine consultation on Yadiel Moody at the sleep center. He presents today with the chief complaints of snoring, apneas and occasional excessive daytime sleepiness. He is referred by Dr. Gaming for evaluation and treatment of sleep disorder breathing. He had a HS on 2/20/19 by an outlying facility which showed severe NARCISO with AHI of 33.5/hr and O2 caryn 62%. He has Auto CPAP however unsure about the pressure. He has not been able to use the CPAP due to congestion and post nasal drip and feeling suffocated.     HISTORY OF PRESENT ILLNESS:       At night,  Yadiel Moody goes to bed around 12 am on weekdays and on the weekends. He gets out of bed at 8 am on weekdays and on the weekends.  He averages about 7-8 hrs of sleep on a good night. Pt has bad nights about 1 nights per week. He falls asleep within 5 minutes. He awakens 3-4 times during the night due to bathroom use. It takes him few min to fall back asleep.      He is aware of snoring/witnessed apneas/gasping or choking in sleep.  He  denies any symptoms of restless legs syndrome such as an \"urge to move\"  He  legs in the evening or bedtime. He  denies any symptoms of narcolepsy such as sleep paralysis or cataplexy, or any symptoms to suggest parasomnias such as sleep walking or acting out of dreams. He  has not used any medications for the sleep problem.    Overall, he  doesnot finds his sleep refreshing. When He  wakes up in the morning, He does feel tired. In terms of  excessive daytime sleepiness,  He denies of sleepiness while reading or watching TV or while driving. [place] sleepiness scale score is normal.He does not take regular naps.       REVIEW OF SYSTEMS:       Constitutional: Denies " "fevers, Denies weight changes  Eyes: Denies changes in vision, no eye pain  Ears/Nose/Throat/Mouth:  + nasal congestion and post nasal drip  Cardiovascular: Denies chest pain or palpitations   Respiratory: Denies shortness of breath , Denies cough  Gastrointestinal/Hepatic: Denies abdominal pain, nausea, vomiting, diarrhea, constipation or GI bleeding   Genitourinary: Denies bladder dysfunction, dysuria or frequency  Musculoskeletal/Rheum: Denies  joint pain and swelling   Skin/Breast: Denies rash  Neurological: Denies headache, confusion, memory loss or focal weakness/parasthesias  Psychiatric: denies mood disorder     Comprehensive review of systems form is reviewed with the patient and is attached in the EMR.     PMH:  has a past medical history of Allergic rhinitis; Anesthesia; Back pain; Chickenpox; Hebrew measles; High cholesterol; Hyperlipidemia; Hypertension; Influenza; Mumps; Nasal drainage; Obesity; Pulmonary hypertension (HCC); Sleep apnea (2015); and Tonsillitis.  MEDS:   Current Outpatient Prescriptions:   •  ezetimibe (ZETIA) 10 MG Tab, TK 1 T PO QD, Disp: , Rfl: 5  •  memantine (NAMENDA) 10 MG Tab, Take 1 Tab by mouth 2 times a day., Disp: 180 Tab, Rfl: 2  •  omeprazole (PRILOSEC) 20 MG delayed-release capsule, Take 20 mg by mouth every day., Disp: , Rfl:   •  vitamin D (CHOLECALCIFEROL) 1000 UNIT Tab, Take 1,000 Units by mouth every day., Disp: , Rfl:   •  lisinopril (PRINIVIL) 20 MG Tab, Take 40 mg by mouth every day., Disp: , Rfl:   ALLERGIES:   Allergies   Allergen Reactions   • Crestor [Rosuvastatin Calcium]      Severe side effects   • Hydrocodone      Gives patient insomnia, makes him \"crazy\"     SURGHX:   Past Surgical History:   Procedure Laterality Date   • UMBILICAL HERNIA REPAIR  2/23/2015    Performed by John H Ganser, M.D. at [facility]   • OTHER ORTHOPEDIC SURGERY  2009    Right Knee Repair   • OTHER ORTHOPEDIC SURGERY  1988    Right Meniscus   • [id number]     • NASAL " "RECONSTRUCTION     • NEPHRECTOMY LAPAROSCOPIC     • TONSILLECTOMY       SOCHX:  reports that he has never smoked. He has never used smokeless tobacco. He reports that he drinks alcohol. He reports that he does not use drugs..   FH:   Family History   Problem Relation Age of Onset   • Heart Attack Father    • Heart Disease Father    • Sleep Apnea Father            Physical Exam:  Vitals/ General Appearance:   Weight/BMI: Body mass index is 40.76 kg/m².  /82 (BP Location: Right arm, Patient Position: Sitting, BP Cuff Size: Adult)   Pulse 72   Resp 15   Ht 1.753 m (5' 9\")   Wt (!) 125.2 kg (276 lb)   SpO2 92%   Vitals:    06/26/19 1327   BP: 124/82   BP Location: Right arm   Patient Position: Sitting   BP Cuff Size: Adult   Pulse: 72   Resp: 15   SpO2: 92%   Weight: (!) 125.2 kg (276 lb)   Height: 1.753 m (5' 9\")       Pt. is alert and oriented to time, place and person. Cooperative and in no apparent distress.       -Head: Atraumatic, normocephalic.   -. Ears: Normal tympanic membrane and no discharge  -. Nose: No inferior turbinate hypertophy, no septal deviation, no polyp.   -. Throat: Oropharynx appears crowded in that the palate is  overhanging (Malam Anita scale 4. Tonsils are not enlarged, uvula is not large, pharynx not inflamed. Tongue is large for the jaw.   -. Neck: Supple. No thyromegaly  -. Chest: Trachea central, no spine deformity   -. Lungs auscultation: B/L good air entry, vesicular breath sounds, no adventitious sounds  -. Heart auscultation: 1st and 2nd heart sounds normal, regular rhythm. No appreciable murmur.  - Extremities: no clubbing, no pedal edema.  - Skin: No rash  - NEUROLOGICAL EXAMINATION: On neurological exam, the patient was alert and oriented x3. speech was clear and fluent without dysarthria.      INVESTIGATIONS:       ASSESSMENT AND PLAN     1. He  has symptoms of Obstructive Sleep Apnea (NARCISO). He  has excessive daytime sleepiness that interferes with activites of daily " living. He  risk factors for NARCISO include obesity, thick neck, and crowded oropharynx.     The pathophysiology of NARCISO and the increased risk of cardiovascular morbidity from untreated NARCISO is discussed in detail with the patient.    We have discussed diagnostic options including in-laboratory, attended polysomnography and home sleep testing. We have also discussed treatment options including airway pressurization, reconstructive otolaryngologic surgery, dental appliances and weight management.       Subsequently,treatment options will be discussed based on the diagnostic study. Meanwhile, He is urged to avoid supine sleep, weight gain and alcoholic beverages since all of these can worsen NARCISO. He is cautioned against drowsy driving. If He feels sleepy while driving, He must pull over for a break/nap, rather than persist on the road, in the interest of He own safety and that of others on the road.    Plan   - restart CPAP with full face mask. If issues with the CPAP or pressure stop by at sleep center for compliance download  and pressure adjustment   - Discuss balloon sinuplasty with ENT   - Re establish care with the allergist   - small F20 was given to try   - HST was reviewed and dicussed with the pt in detail   - F/u in 8 weeks     2.  Regarding treatment of other past medical problems and general health maintenance,  He is urged to follow up with PCP.    3 Patient's body mass index is 40.76 kg/m². Exercise and nutrition counseling were performed at this visit.

## 2019-08-26 ENCOUNTER — SLEEP CENTER VISIT (OUTPATIENT)
Dept: SLEEP MEDICINE | Facility: MEDICAL CENTER | Age: 64
End: 2019-08-26
Payer: COMMERCIAL

## 2019-08-26 VITALS
OXYGEN SATURATION: 93 % | HEART RATE: 69 BPM | BODY MASS INDEX: 39.99 KG/M2 | RESPIRATION RATE: 15 BRPM | DIASTOLIC BLOOD PRESSURE: 82 MMHG | HEIGHT: 69 IN | WEIGHT: 270 LBS | SYSTOLIC BLOOD PRESSURE: 128 MMHG

## 2019-08-26 DIAGNOSIS — G47.33 OSA (OBSTRUCTIVE SLEEP APNEA): ICD-10-CM

## 2019-08-26 PROCEDURE — 99213 OFFICE O/P EST LOW 20 MIN: CPT | Performed by: FAMILY MEDICINE

## 2019-08-26 NOTE — PROGRESS NOTES
[facility] Sleep Center Follow Up Note     Date: 8/26/2019 / Time: 10:09 AM    Patient ID:   Name:             Yadiel Moody   YOB: 1955  Age:                 63 y.o.  male   MRN:               [id number]      Thank you for requesting a sleep medicine consultation on Yadiel Moody at the sleep center. He presents today with the chief complaints of NARCISO follow up.     HISTORY OF PRESENT ILLNESS:       Pt is currently on BiPAP 8/4. He goes to sleep around 11 pm-1am and wakes up around 7-8 am. He is getting about 7-8 hrs of sleep on a good night and about 6 hr of sleep on a bad night. The bad nights are about 1 per week. Overall, he doesnot finds his sleep refreshing.He does not take regular naps.     He is not using  most days of the week. Pt reports 23 min of average nightly use of BiPAP. Pt denies snoring, gasping,choking.Pt also denies significant mask leak that is interfering with sleep. The 30 day compliance was downloaded which shows inadequate compliance with more that 4 hr usage about 0%. The AHI is has improved to 10/hr. The mask leak is normal. The symptoms of excessive daytime, snoring and gasping has continued.       SLEEP HISTORY   HST on 2/20/19 by an outlying facility which showed severe NARCISO with AHI of 33.5/hr and O2 caryn 62%      REVIEW OF SYSTEMS:       Constitutional: Denies fevers, Denies weight changes  Eyes: Denies changes in vision, no eye pain  Ears/Nose/Throat/Mouth: Denies nasal congestion or sore throat   Cardiovascular: Denies chest pain or palpitations   Respiratory: Denies shortness of breath , Denies cough  Gastrointestinal/Hepatic: Denies abdominal pain, nausea, vomiting, diarrhea, constipation or GI bleeding   Genitourinary: Deniesdysuria or frequency  Musculoskeletal/Rheum: Denies  joint pain and swelling   Skin/Breast: Denies rash,   Neurological: Denies headache, confusion, memory loss or focal weakness/parasthesias  Psychiatric: denies mood disorder  "  Sleep: + snoring     Comprehensive review of systems form is reviewed with the patient and is attached in the EMR.     PMH:  has a past medical history of Allergic rhinitis, Anesthesia, Back pain, Chickenpox, Welsh measles, High cholesterol, Hyperlipidemia, Hypertension, Influenza, Mumps, Nasal drainage, Obesity, Pulmonary hypertension (HCC), Sleep apnea (2015), and Tonsillitis.  MEDS:   Current Outpatient Medications:   •  ezetimibe (ZETIA) 10 MG Tab, TK 1 T PO QD, Disp: , Rfl: 5  •  memantine (NAMENDA) 10 MG Tab, Take 1 Tab by mouth 2 times a day., Disp: 180 Tab, Rfl: 2  •  omeprazole (PRILOSEC) 20 MG delayed-release capsule, Take 20 mg by mouth every day., Disp: , Rfl:   •  vitamin D (CHOLECALCIFEROL) 1000 UNIT Tab, Take 1,000 Units by mouth every day., Disp: , Rfl:   •  lisinopril (PRINIVIL) 20 MG Tab, Take 40 mg by mouth every day., Disp: , Rfl:   ALLERGIES:   Allergies   Allergen Reactions   • Crestor [Rosuvastatin Calcium]      Severe side effects   • Hydrocodone      Gives patient insomnia, makes him \"crazy\"     SURGHX:   Past Surgical History:   Procedure Laterality Date   • UMBILICAL HERNIA REPAIR  2/23/2015    Performed by [facility] H G[facility]er, M.D. at [facility]   • OTHER ORTHOPEDIC SURGERY  2009    Right Knee Repair   • OTHER ORTHOPEDIC SURGERY  1988    Right Meniscus   • [id number]     • NASAL RECONSTRUCTION     • NEPHRECTOMY LAPAROSCOPIC     • TONSILLECTOMY       SOCHX:  reports that he has never smoked. He has never used smokeless tobacco. He reports that he drinks alcohol. He reports that he does not use drugs..  FH:   Family History   Problem Relation Age of Onset   • Heart Attack Father    • Heart Disease Father    • Sleep Apnea Father          Physical Exam:  Vitals/ General Appearance:   Weight/BMI: Body mass index is 39.87 kg/m².  /82 (BP Location: Right arm, Patient Position: Sitting, BP Cuff Size: Adult)   Pulse 69   Resp 15   Ht 1.753 m (5' 9\")   Wt 122.5 kg (270 lb)   SpO2 " "93%   Vitals:    08/26/19 0955   BP: 128/82   BP Location: Right arm   Patient Position: Sitting   BP Cuff Size: Adult   Pulse: 69   Resp: 15   SpO2: 93%   Weight: 122.5 kg (270 lb)   Height: 1.753 m (5' 9\")       Pt. is alert and oriented to time, place and person. Cooperative and in no apparent distress.       -Head: Atraumatic, normocephalic.   -. Throat: Oropharynx appears crowded in that the palate is  overhanging (Malam Anita scale 4)  -. Neck: Supple. No thyromegaly  -. Chest: Trachea central, no spine deformity   -. Lungs auscultation: B/L good air entry, vesicular breath sounds, no adventitious sounds  -. Heart auscultation: 1st and 2nd heart sounds normal, regular rhythm. No appreciable murmur.  - Extremities: no clubbing, no pedal edema.  - Skin: No rash  - NEUROLOGICAL EXAMINATION: On neurological exam, the patient was alert and oriented x3. speech was clear and fluent without dysarthria.      ASSESSMENT AND PLAN     1. Sleep Apnea .   The pathophysiology of sleep anea and the increased risk of cardiovascular morbidity from untreated sleep apnea is discussed in detail with the patient.   He is urged to avoid supine sleep, weight gain and alcoholic beverages since all of these can worsen sleep apnea. He is cautioned against drowsy driving. If He feels sleepy while driving, He must pull over for a break/nap, rather than persist on the road, in the interest of He own safety and that of others on the road.   Plan   - Changed pressure to Auto BiPAP EPAP min 6 cm IPA max 18 PS 4 cm with FFM   - BiPAP titration is ordered today but to be done 3-4 weeks after septoplasty on 09/16/19   - compliance download was reviewed and discussed with the pt   - compliance was reinforced     2. Regarding treatment of other past medical problems and general health maintenance,  He is urged to follow up with PCP.      "

## 2019-08-27 ENCOUNTER — OFFICE VISIT (OUTPATIENT)
Dept: NEUROLOGY | Facility: MEDICAL CENTER | Age: 64
End: 2019-08-27
Payer: COMMERCIAL

## 2019-08-27 VITALS
OXYGEN SATURATION: 95 % | BODY MASS INDEX: 41.43 KG/M2 | HEART RATE: 74 BPM | TEMPERATURE: 98 F | SYSTOLIC BLOOD PRESSURE: 120 MMHG | DIASTOLIC BLOOD PRESSURE: 72 MMHG | WEIGHT: 273.4 LBS | HEIGHT: 68 IN | RESPIRATION RATE: 18 BRPM

## 2019-08-27 DIAGNOSIS — R68.89 SPELLS OF DECREASED ATTENTIVENESS: ICD-10-CM

## 2019-08-27 DIAGNOSIS — R53.83 FATIGUE, UNSPECIFIED TYPE: ICD-10-CM

## 2019-08-27 DIAGNOSIS — G47.30 SLEEP APNEA, UNSPECIFIED TYPE: Primary | ICD-10-CM

## 2019-08-27 PROCEDURE — 99214 OFFICE O/P EST MOD 30 MIN: CPT

## 2019-08-27 RX ORDER — NIACIN 500 MG
1000 TABLET ORAL DAILY
COMMUNITY
End: 2021-12-16

## 2019-08-27 RX ORDER — ASPIRIN 325 MG
81 TABLET ORAL EVERY 6 HOURS PRN
COMMUNITY
End: 2023-05-22

## 2019-08-27 RX ORDER — LAMOTRIGINE 25 MG/1
25 TABLET ORAL DAILY
Qty: 90 TAB | Refills: 3 | Status: SHIPPED | OUTPATIENT
Start: 2019-08-27 | End: 2020-10-05 | Stop reason: SDUPTHER

## 2019-08-27 RX ORDER — ASCORBIC ACID 500 MG
500 TABLET ORAL DAILY
COMMUNITY
End: 2023-05-22

## 2019-08-27 RX ORDER — M-VIT,TX,IRON,MINS/CALC/FOLIC 27MG-0.4MG
1 TABLET ORAL DAILY
COMMUNITY
End: 2023-05-22

## 2019-08-27 RX ORDER — CETIRIZINE HYDROCHLORIDE 10 MG/1
10 TABLET ORAL DAILY
COMMUNITY
End: 2021-12-16

## 2019-08-27 NOTE — PROGRESS NOTES
CC: Memory loss    Followed wit Dr Gaming     HPI:  62 yo male with memory loss on Memantine 10 mg bid and also was on Lamictal.  TIA symptoms vs seizures? Focusing difficulties, fuzzy feeling,out of focus  Feels it is worse now since he stopped Lamictal.  Nocturnal hypoxia.  Lamictal helped decrease confusion and spells and since he is off he     ROS:   Constitutional: No fevers or chills.  Eyes: No blurry vision or eye pain.  ENT: No dysphagia or hearing loss.  Respiratory: No cough or shortness of breath.  Cardiovascular: No chest pain or palpitations.  GI: No nausea, vomiting, or diarrhea.  : No urinary incontinence or dysuria.  Musculoskeletal: No joint swelling or arthralgias.  Skin: No skin rashes.  Neuro: No headaches, dizziness, or tremors.  Endocrine: No heat or cold intolerance. No polydipsia or polyuria.  Psych: No depression or anxiety.  Heme/Lymph: No easy bruising or swollen lymph nodes.      Past Medical History:    has a past medical history of Allergic rhinitis, Anesthesia, Back pain, Chickenpox, [place] measles, High cholesterol, Hyperlipidemia, Hypertension, Influenza, Mumps, Nasal drainage, Obesity, Pulmonary hypertension (HCC), Sleep apnea (2015), and Tonsillitis.    Past Surgical History:   Past Surgical History:   Procedure Laterality Date   • UMBILICAL HERNIA REPAIR  2/23/2015    Performed by John H Ganser, M.D. at [facility]   • OTHER ORTHOPEDIC SURGERY  2009    Right Knee Repair   • OTHER ORTHOPEDIC SURGERY  1988    Right Meniscus   • [id number]     • NASAL RECONSTRUCTION     • NEPHRECTOMY LAPAROSCOPIC     • TONSILLECTOMY         Social History:   Social History     Socioeconomic History   • Marital status:      Spouse name: Not on file   • Number of children: Not on file   • Years of education: Not on file   • Highest education level: Not on file   Occupational History   • Not on file   Social Needs   • Financial resource strain: Not on file   • Food insecurity:      "Worry: Not on file     Inability: Not on file   • Transportation needs:     Medical: Not on file     Non-medical: Not on file   Tobacco Use   • Smoking status: Never Smoker   • Smokeless tobacco: Never Used   Substance and Sexual Activity   • Alcohol use: Yes     Comment: 0-1 glasses of wine a week   • Drug use: No   • Sexual activity: Not on file   Lifestyle   • Physical activity:     Days per week: Not on file     Minutes per session: Not on file   • Stress: Not on file   Relationships   • Social connections:     Talks on phone: Not on file     Gets together: Not on file     Attends [employer] service: Not on file     Active member of club or organization: Not on file     Attends meetings of clubs or organizations: Not on file     Relationship status: Not on file   • Intimate partner violence:     Fear of current or ex partner: Not on file     Emotionally abused: Not on file     Physically abused: Not on file     Forced sexual activity: Not on file   Other Topics Concern   • Not on file   Social History Narrative   • Not on file       Family History:   Family History   Problem Relation Age of Onset   • Heart Attack Father    • Heart Disease Father    • Sleep Apnea Father        Allergies:   Allergies   Allergen Reactions   • Crestor [Rosuvastatin Calcium]      Severe side effects   • Hydrocodone      Gives patient insomnia, makes him \"crazy\"       Physical Exam:     Encounter Vitals  Standard Vitals  Vitals  Blood Pressure: 120/72  Temperature: 36.7 °C (98 °F)  Temp src: Temporal  Pulse: 74  Respiration: 18  Pulse Oximetry: 95 %  Height: 172.7 cm (5' 8\")  Weight: 124 kg (273 lb 6.4 oz)  Encounter Vitals  Temperature: 36.7 °C (98 °F)  Temp src: Temporal  Blood Pressure: 120/72  Pulse: 74  Respiration: 18  Pulse Oximetry: 95 %  Weight: 124 kg (273 lb 6.4 oz)  Height: 172.7 cm (5' 8\")  BMI (Calculated): 41.57      Constitutional: Well-developed, well-nourished, good hygiene. Appears stated age.  Cardiovascular: RRR, " with no murmurs, rubs or gallops. No carotid bruits.   Respiratory: Lungs CTA B/L, no W/R/R.   Abdomen: Soft Non-tender to Palpation. Non-distended.  Extremities: No peripheral edema, pedal pulses intact.   Skin: Warm, dry, intact. No rashes observed.  Eyes: Sclera anicteric   Funduscopic: Optic discs flat with no evidence of papilledema or pallor.   Neurologic:   Mental Status: Awake, alert, oriented x 3.   Speech: Fluent with normal prosody.   Memory: Able to recall recent and remote events accurately.    Concentration: Attentive. Able to focus on history and follow multi-step commands.              Fund of Knowledge: Appropriate   Cranial Nerves:    CN II: PERRL. No afferent pupillary defect.    CN III, IV, VI: Good eye closure, EOMI.     CN V: Facial sensation intact and symmetric.     CN VII: No facial asymmetry.     CN VIII: Hearing intact.     CN IX and X: Palate elevates symmetrically. Normal gag reflex.    CN XI: Symmetric shoulder shrug.     CN XII: Tongue midline.    Sensory: Intact light touch, vibration and temperature.    Coordination: No evidence of past-pointing on finger to nose testing, no dysdiadochokinesia. Heel to shin intact.             DTR's: 2+ throughout without clonus.    Babinski: Toes downgoing bilaterally.   Romberg: Negative.   Movements: No tremors observed.   Musculoskeletal:    Strength: 5/5 in upper and lower extremities bilaterally.   Gait: Steady, narrow based.    Tone: Normal bulk and tone.   Joints: No swelling.     Labs:  Lab Results   Component Value Date/Time    WBC 6.8 01/20/2013 03:29 AM    RBC 4.80 01/20/2013 03:29 AM    HEMOGLOBIN 14.9 01/20/2013 03:29 AM    HEMATOCRIT 45.4 01/20/2013 03:29 AM    MCV 94.4 01/20/2013 03:29 AM    MCH 31.0 01/20/2013 03:29 AM    MCHC 32.8 01/20/2013 03:29 AM    MPV 9.7 01/20/2013 03:29 AM    NEUTSPOLYS 51.7 01/20/2013 03:29 AM    LYMPHOCYTES 32.6 01/20/2013 03:29 AM    MONOCYTES 12.0 (H) 01/20/2013 03:29 AM    EOSINOPHILS 3.2 01/20/2013  03:29 AM    BASOPHILS 0.6 01/20/2013 03:29 AM      Lab Results   Component Value Date/Time    SODIUM 139 01/20/2013 03:29 AM    POTASSIUM 3.6 01/20/2013 03:29 AM    CHLORIDE 102 01/20/2013 03:29 AM    CO2 26 01/20/2013 03:29 AM    GLUCOSE 85 01/20/2013 03:29 AM    BUN 14 01/20/2013 03:29 AM    CREATININE 1.10 01/20/2013 03:29 AM      Imaging:   MRI brain personally reviewed some microvascular ischemic changes    EEG   INTERPRETATION:      3/2018  ________________________________________________________________________     This scalp EEG is consistent with mild focal cortical dysfunction more over the left      This remains nonspecfic     If clinical suspicion of seizure remains high.  Prolonged outpatient EEG   monitoring may be of help.    Assessment/Plan  Episodic confusion   Memory loss   Sleep epnea with hypoxemic episodes   ? Seizures  Was better on lamictal 25 mg bid wishes to try again   Will re-start lamictal 25 mg qweek and if well tolerated increase to 25 mg bid   Repeat EEG   Vit D check  TSH,folate, b12 labs     RTC 4 months   Patient was seen for 30  minutes face to face of which > 50% of appointment time was spent on counseling and coordination of care regarding the above.

## 2019-09-09 ENCOUNTER — HOSPITAL ENCOUNTER (OUTPATIENT)
Dept: LAB | Facility: MEDICAL CENTER | Age: 64
End: 2019-09-09
Payer: COMMERCIAL

## 2019-09-09 DIAGNOSIS — R68.89 SPELLS OF DECREASED ATTENTIVENESS: ICD-10-CM

## 2019-09-09 DIAGNOSIS — G47.30 SLEEP APNEA, UNSPECIFIED TYPE: ICD-10-CM

## 2019-09-09 DIAGNOSIS — R53.83 FATIGUE, UNSPECIFIED TYPE: ICD-10-CM

## 2019-09-09 LAB
FOLATE SERPL-MCNC: >24 NG/ML
TSH SERPL DL<=0.005 MIU/L-ACNC: 0.74 UIU/ML (ref 0.38–5.33)
VIT B12 SERPL-MCNC: 395 PG/ML (ref 211–911)

## 2019-09-09 PROCEDURE — 82607 VITAMIN B-12: CPT

## 2019-09-09 PROCEDURE — 36415 COLL VENOUS BLD VENIPUNCTURE: CPT

## 2019-09-09 PROCEDURE — 82652 VIT D 1 25-DIHYDROXY: CPT

## 2019-09-09 PROCEDURE — 84443 ASSAY THYROID STIM HORMONE: CPT

## 2019-09-09 PROCEDURE — 82746 ASSAY OF FOLIC ACID SERUM: CPT

## 2019-09-11 LAB — 1,25(OH)2D3 SERPL-MCNC: 69.8 PG/ML (ref 19.9–79.3)

## 2019-11-26 ENCOUNTER — SLEEP STUDY (OUTPATIENT)
Dept: SLEEP MEDICINE | Facility: MEDICAL CENTER | Age: 64
End: 2019-11-26
Attending: FAMILY MEDICINE
Payer: COMMERCIAL

## 2019-11-26 DIAGNOSIS — G47.33 OSA (OBSTRUCTIVE SLEEP APNEA): ICD-10-CM

## 2019-11-26 PROCEDURE — 95811 POLYSOM 6/>YRS CPAP 4/> PARM: CPT | Performed by: INTERNAL MEDICINE

## 2019-11-27 NOTE — PROCEDURES
Comments:  The patient underwent a diagnostic polysomnogram using the standard montage for measurement of parameters of sleep, respiratory events, movement abnormalities, and heart rate and rhythm.   A microphone was used to monitor snoring.  Interpretation:  Study start time was 10:05:25 PM. Diagnostic recording time was 6h 39.5m with a total sleep time of 5h 4.0m resulting in a sleep efficiency of 76.10%%.   Sleep latency from the start of the study was 24 minutes and the latency from sleep to REM was 84 minutes.  In total,45 arousals were scored for an arousal index of 8.9.  Respiratory:  There were a total of 2 apneas consisting of 0 obstructive apneas, 0 mixed apneas, and 2 central apneas. A total of 60 hypopneas were scored.  The apnea index was 0.39 per hour and the hypopnea index was 11.84 per hour resulting in an overall AHI of 12.24.  AHI during rem was 27.5 and AHI while supine was 0.00.  Oximetry:  There was a mean oxygen saturation of 89.0% with a minimum oxygen saturation of 78.0%. Time spent with oxygen saturations below 89% was 180.3 minutes.  Cardiac:  The highest heart rate seen while awake was 92 BPM while the highest heart rate during sleep was 83 BPM with an average sleeping heart rate of 67 BPM.  Limb Movements:  There were a total of 228 PLMs during sleep, of which 26 were PLMS arousals. This resulted in a PLMS index of 45.0 and a PLMS arousal index of 5.1.    Sleep efficiency was 76%.  Sleep architecture showed a lack of stage III sleep.  Sleep latency was normal at 24 minutes.  Elevated periodic limb movements with index 44. Sinus rhythm noted, with no arrhythmias.    BiPAP was started at 10/6 cm of water and titrated to 16/12 cm of water.  On BIPAP: 14/10 cm of water the resultant AHI was 5 in NREM sleep, however mean oximetry was 89%. On BiPAP: 16/12 cm of water mean oximetry improved to 91% SPO2 in REM sleep.    Interpretation:  Successful titration at BiPAP: 16/12 cmH2O using a medium  AirFit N30i nasal pillow mask.  Elevated periodic limb movements.    Recommendations:  BiPAP: 16/12 cmH2O using a medium AirFit N30i nasal pillow mask.  Clinical correlation regarding periodic limb movements which be secondary to sleep disordered breathing or represent a secondary sleep disorder.

## 2019-12-13 ENCOUNTER — NON-PROVIDER VISIT (OUTPATIENT)
Dept: NEUROLOGY | Facility: MEDICAL CENTER | Age: 64
End: 2019-12-13
Payer: COMMERCIAL

## 2019-12-13 PROCEDURE — 95819 EEG AWAKE AND ASLEEP: CPT

## 2019-12-24 ENCOUNTER — PATIENT MESSAGE (OUTPATIENT)
Dept: NEUROLOGY | Facility: MEDICAL CENTER | Age: 64
End: 2019-12-24

## 2019-12-24 NOTE — PROCEDURES
NAME: Yadiel Moody     REFERRING Dr: Dr Morales      INDICATION: 1. Poor memory for 15 years, progressively worsened  2.Confusional spells         TECHNIQUE: 30 channel routine electroencephalogram (EEG) was performed in accordance with the international 10-20 system. The study was reviewed in bipolar and referential montages. The recording examined the patient during wakeful and drowsy state(s).      DESCRIPTION OF THE RECORDING:        Background rhythm during awake stage shows well-organized, well-developed, average voltage 10 to 11 hertz alpha activity in the posterior regions.  It blocks with eye opening and it is bilaterally synchronous and symmetrical.  No spike-and-wave discharges but there are delta/blunted sharp bursts more over left hemisphere.  Photic stimulation and hyperventilation did not induce any abnormalities. Stage II sleep was recorded.        ACTIVATION PROCEDURES:       Photic Stimulation and HV were done.     ICTAL AND/OR INTERICTAL FINDINGS:    No focal or generalized epileptiform activity noted.  No spike-and-wave discharges but there are delta/blunted sharp bursts lasting 1-2 seconds and repeating every 2-3 minutes,  more over left hemisphere.  No clinical events or seizures were reported or recorded during the study.       Interpretation   This is abnormal EEG. No seizures were recorded however there was regional slowing over left hemisphere (as described above)  which may signify structural deficit or area of increased cortical irritability. Clinical and imaging correlation is advised.    EKG: sampling of the EKG recording demonstrated sinus rhythm.     EVENTS:  None.    Note: A normal EEG does not rule out epilepsy.  If the clinical suspicion remains high for seizures, a prolonged recording to capture clinical or subclinical events may be helpful.

## 2020-02-13 ENCOUNTER — OFFICE VISIT (OUTPATIENT)
Dept: NEUROLOGY | Facility: MEDICAL CENTER | Age: 65
End: 2020-02-13
Payer: COMMERCIAL

## 2020-02-13 VITALS
DIASTOLIC BLOOD PRESSURE: 66 MMHG | HEIGHT: 68 IN | BODY MASS INDEX: 41.37 KG/M2 | HEART RATE: 63 BPM | RESPIRATION RATE: 16 BRPM | TEMPERATURE: 97.6 F | WEIGHT: 273 LBS | SYSTOLIC BLOOD PRESSURE: 124 MMHG | OXYGEN SATURATION: 97 %

## 2020-02-13 DIAGNOSIS — R41.3 MEMORY PROBLEM: ICD-10-CM

## 2020-02-13 DIAGNOSIS — R53.83 FATIGUE, UNSPECIFIED TYPE: ICD-10-CM

## 2020-02-13 DIAGNOSIS — G47.39 OTHER SLEEP APNEA: ICD-10-CM

## 2020-02-13 DIAGNOSIS — R68.89 SPELLS OF DECREASED ATTENTIVENESS: ICD-10-CM

## 2020-02-13 DIAGNOSIS — G47.30 SLEEP APNEA, UNSPECIFIED TYPE: ICD-10-CM

## 2020-02-13 PROCEDURE — 99213 OFFICE O/P EST LOW 20 MIN: CPT

## 2020-02-13 RX ORDER — FLUTICASONE PROPIONATE 50 MCG
SPRAY, SUSPENSION (ML) NASAL
COMMUNITY
Start: 2020-01-21 | End: 2023-05-22

## 2020-02-13 NOTE — PROGRESS NOTES
CC: follow up for memory loss       HPI:  65 yo male with memory loss, sleep apnea and coming for a follow up. Memory loss is stable but he is doing OK and old memories are good and he remembers them. He has macular degeneration in right eye  He restarted lamictal and now he is on 25 mg bid and he feels better with fasting he is doing and with lamictal.  EEG was normal.  Sleep apnea and on cpap now and feels that this may also be helping.  Foggy episodes and fuzzy feeling mostly improved when he started fasting.  He is eating one meal a day.  Not exercising as much and he wants to get back to the gym.      ROS:   Constitutional: No fevers or chills.  Eyes: No blurry vision or eye pain.  ENT: No dysphagia or hearing loss.  Respiratory: No cough or shortness of breath.  Cardiovascular: No chest pain or palpitations.  GI: No nausea, vomiting, or diarrhea.  : No urinary incontinence or dysuria.  Musculoskeletal: No joint swelling or arthralgias.  Skin: No skin rashes.  Neuro: Jasper above.  Endocrine: No heat or cold intolerance. No polydipsia or polyuria.  Psych: No depression or anxiety.  Heme/Lymph: No easy bruising or swollen lymph nodes.      Past Medical History:   Past Medical History:   Diagnosis Date   • Allergic rhinitis    • Anesthesia     slow to wake up; ?apneic, needs encourangement to breathe   • Back pain    • Chickenpox    • Portuguese measles    • High cholesterol    • Hyperlipidemia    • Hypertension    • Influenza    • Mumps    • Nasal drainage    • Obesity    • Pulmonary hypertension (HCC)    • Sleep apnea 2015    unable to use bipap right now due to sinus issue   • Tonsillitis        Past Surgical History:   Past Surgical History:   Procedure Laterality Date   • UMBILICAL HERNIA REPAIR  2/23/2015    Performed by John H Ganser, M.D. at [facility]   • OTHER ORTHOPEDIC SURGERY  2009    Right Knee Repair   • OTHER ORTHOPEDIC SURGERY  1988    Right Meniscus   • [id number]     • NASAL  "RECONSTRUCTION     • NEPHRECTOMY LAPAROSCOPIC     • TONSILLECTOMY         Social History:   Social History     Socioeconomic History   • Marital status:      Spouse name: Not on file   • Number of children: Not on file   • Years of education: Not on file   • Highest education level: Not on file   Occupational History   • Not on file   Social Needs   • Financial resource strain: Not on file   • Food insecurity     Worry: Not on file     Inability: Not on file   • Transportation needs     Medical: Not on file     Non-medical: Not on file   Tobacco Use   • Smoking status: Never Smoker   • Smokeless tobacco: Never Used   Substance and Sexual Activity   • Alcohol use: Yes     Comment: 0-1 glasses of wine a week   • Drug use: No   • Sexual activity: Not on file   Lifestyle   • Physical activity     Days per week: Not on file     Minutes per session: Not on file   • Stress: Not on file   Relationships   • Social connections     Talks on phone: Not on file     Gets together: Not on file     Attends Pentecostalism service: Not on file     Active member of club or organization: Not on file     Attends meetings of clubs or organizations: Not on file     Relationship status: Not on file   • Intimate partner violence     Fear of current or ex partner: Not on file     Emotionally abused: Not on file     Physically abused: Not on file     Forced sexual activity: Not on file   Other Topics Concern   • Not on file   Social History Narrative   • Not on file       Family History:   Family History   Problem Relation Age of Onset   • Heart Attack Father    • Heart Disease Father    • Sleep Apnea Father        Allergies:   Allergies   Allergen Reactions   • Crestor [Rosuvastatin Calcium]      Severe side effects   • Hydrocodone      Gives patient insomnia, makes him \"crazy\"       Physical Exam:     Encounter Vitals  Standard Vitals  Vitals  Blood Pressure: 124/66  Temperature: 36.4 °C (97.6 °F)  Temp src: Temporal  Pulse: " "63  Respiration: 16  Pulse Oximetry: 97 %  Height: 172.7 cm (5' 8\")  Weight: 123.8 kg (273 lb)  Encounter Vitals  Temperature: 36.4 °C (97.6 °F)  Temp src: Temporal  Blood Pressure: 124/66  Pulse: 63  Respiration: 16  Pulse Oximetry: 97 %  Weight: 123.8 kg (273 lb)  Height: 172.7 cm (5' 8\")  BMI (Calculated): 41.51  Constitutional: Well-developed, well-nourished, good hygiene. Appears stated age.  Cardiovascular: RRR, with no murmurs, rubs or gallops. No carotid bruits.   Respiratory: Lungs CTA B/L, no W/R/R.   Abdomen: Soft Non-tender to Palpation. Non-distended.  Extremities: No peripheral edema, pedal pulses intact.   Skin: Warm, dry, intact. No rashes observed.  Eyes: Sclera anicteric   Funduscopic: Optic discs flat with no evidence of papilledema or pallor.   Neurologic:   Mental Status: Awake, alert, oriented x 3.   Speech: Fluent with normal prosody.   Memory: Able to recall recent and remote events accurately.    Concentration: Attentive. Able to focus on history and follow multi-step commands.              Fund of Knowledge: Appropriate   Cranial Nerves:    CN II: PERRL. No afferent pupillary defect.    CN III, IV, VI: Good eye closure, EOMI.     CN V: Facial sensation intact and symmetric.     CN VII: No facial asymmetry.     CN VIII: Hearing intact.     CN IX and X: Palate elevates symmetrically. Normal gag reflex.    CN XI: Symmetric shoulder shrug.     CN XII: Tongue midline.    Sensory: Intact light touch, vibration and temperature.    Coordination: No evidence of past-pointing on finger to nose testing, no dysdiadochokinesia. Heel to shin intact.             DTR's: 2+ throughout without clonus.    Babinski: Toes downgoing bilaterally.   Romberg: Negative.   Movements: No tremors observed.   Musculoskeletal:    Strength: 5/5 in upper and lower extremities bilaterally.   Gait: Steady, narrow based.    Tone: Normal bulk and tone.   Joints: No swelling.     Labs:  Lab Results   Component Value Date/Time "    SODIUM 139 2013 03:29 AM    POTASSIUM 3.6 2013 03:29 AM    CHLORIDE 102 2013 03:29 AM    CO2 26 2013 03:29 AM    GLUCOSE 85 2013 03:29 AM    BUN 14 2013 03:29 AM    CREATININE 1.10 2013 03:29 AM      Lab Results   Component Value Date/Time    WBC 6.8 2013 03:29 AM    RBC 4.80 2013 03:29 AM    HEMOGLOBIN 14.9 2013 03:29 AM    HEMATOCRIT 45.4 2013 03:29 AM    MCV 94.4 2013 03:29 AM    MCH 31.0 2013 03:29 AM    MCHC 32.8 2013 03:29 AM    MPV 9.7 2013 03:29 AM    NEUTSPOLYS 51.7 2013 03:29 AM    LYMPHOCYTES 32.6 2013 03:29 AM    MONOCYTES 12.0 (H) 2013 03:29 AM    EOSINOPHILS 3.2 2013 03:29 AM    BASOPHILS 0.6 2013 03:29 AM    Imagin2017   personally reviewed   Age related atrophy   Mild chronic ischemic changes    B12 395  Folate > 24  TSH 0.740   Current Outpatient Medications on File Prior to Visit   Medication Sig Dispense Refill   • fluticasone (FLONASE) 50 MCG/ACT nasal spray      • therapeutic multivitamin-minerals (THERAGRAN-M) Tab Take 1 Tab by mouth every day.     • aspirin (ASA) 325 MG Tab Take 81 mg by mouth every 6 hours as needed.     • ascorbic acid (ASCORBIC ACID) 500 MG Tab Take 500 mg by mouth every day.     • lamoTRIgine (LAMICTAL) 25 MG Tab Take 1 Tab by mouth every day. 90 Tab 3   • ezetimibe (ZETIA) 10 MG Tab TK 1 T PO QD  5   • memantine (NAMENDA) 10 MG Tab Take 1 Tab by mouth 2 times a day. 180 Tab 2   • omeprazole (PRILOSEC) 20 MG delayed-release capsule Take 20 mg by mouth every day.     • vitamin D (CHOLECALCIFEROL) 1000 UNIT Tab Take 4,000 Units by mouth every day.     • lisinopril (PRINIVIL) 20 MG Tab Take 40 mg by mouth every day.     • niacin 500 MG Tab Take 1,000 mg by mouth every day.     • cetirizine (ZYRTEC) 10 MG Tab Take 10 mg by mouth every day.       No current facility-administered medications on file prior to visit.      Assessment/Plan:  [facility]  amnestic   Possible subclinical seizures   Back on Lamictal 25 mg bid and doing well   EEG reviewed with patient and normal   Exercise,lose weight   Doing better this time     Discussed healthy lifestyle   Exercise  Diet     F.u in 6 months

## 2020-04-13 ENCOUNTER — SLEEP CENTER VISIT (OUTPATIENT)
Dept: SLEEP MEDICINE | Facility: MEDICAL CENTER | Age: 65
End: 2020-04-13
Payer: COMMERCIAL

## 2020-04-13 VITALS
HEIGHT: 68 IN | SYSTOLIC BLOOD PRESSURE: 124 MMHG | RESPIRATION RATE: 14 BRPM | DIASTOLIC BLOOD PRESSURE: 82 MMHG | HEART RATE: 61 BPM | OXYGEN SATURATION: 95 % | BODY MASS INDEX: 38.8 KG/M2 | WEIGHT: 256 LBS

## 2020-04-13 DIAGNOSIS — G47.33 OSA (OBSTRUCTIVE SLEEP APNEA): ICD-10-CM

## 2020-04-13 PROCEDURE — 99213 OFFICE O/P EST LOW 20 MIN: CPT | Performed by: FAMILY MEDICINE

## 2020-04-27 ENCOUNTER — TELEPHONE (OUTPATIENT)
Dept: PULMONOLOGY | Facility: HOSPICE | Age: 65
End: 2020-04-27

## 2020-04-30 NOTE — TELEPHONE ENCOUNTER
"Please have Dr. Montiel add an addendum to 4/13/2020 notes to document the following at the end of the notes    \"Ordering replacement BiPAP due to current machine being over 5 years old and continuing to alarm during the night, interrupting patients sleep\"    "

## 2020-07-09 ENCOUNTER — TELEPHONE (OUTPATIENT)
Dept: SLEEP MEDICINE | Facility: MEDICAL CENTER | Age: 65
End: 2020-07-09

## 2020-07-09 DIAGNOSIS — G47.33 OSA (OBSTRUCTIVE SLEEP APNEA): ICD-10-CM

## 2020-07-09 NOTE — TELEPHONE ENCOUNTER
Pt did not get new BIPAP yet, Updated notes sent and [facility] r/s.    Pt is requesting an order to purchase a travel BIPAP, please sign.

## 2020-07-10 ENCOUNTER — APPOINTMENT (OUTPATIENT)
Dept: SLEEP MEDICINE | Facility: MEDICAL CENTER | Age: 65
End: 2020-07-10
Payer: COMMERCIAL

## 2020-10-01 ENCOUNTER — OFFICE VISIT (OUTPATIENT)
Dept: SLEEP MEDICINE | Facility: MEDICAL CENTER | Age: 65
End: 2020-10-01
Payer: COMMERCIAL

## 2020-10-01 VITALS
HEART RATE: 60 BPM | WEIGHT: 272 LBS | BODY MASS INDEX: 40.29 KG/M2 | OXYGEN SATURATION: 95 % | HEIGHT: 69 IN | SYSTOLIC BLOOD PRESSURE: 130 MMHG | DIASTOLIC BLOOD PRESSURE: 72 MMHG

## 2020-10-01 DIAGNOSIS — I10 HYPERTENSION, UNSPECIFIED TYPE: ICD-10-CM

## 2020-10-01 DIAGNOSIS — G47.33 OSA (OBSTRUCTIVE SLEEP APNEA): ICD-10-CM

## 2020-10-01 PROCEDURE — 99213 OFFICE O/P EST LOW 20 MIN: CPT | Performed by: NURSE PRACTITIONER

## 2020-10-01 NOTE — PROGRESS NOTES
Chief Complaint   Patient presents with   • Follow-Up     NARCISO- Last seen 04/13/2020       HPI:      Mr. Moody is a 65 y/o male patient who is in today for NARCISO f/u. PMH includes shortness of breath, hypertension, memory problem, obesity, hyperlipidemia.    HST on 2/20/19 by an outlying facility which showed severe NARCISO with AHI of 33.5/hr and O2 caryn 62%.     PSG BiPAP titration from 11/26/19 indicated BiPAP was started at 10/6 cm of water and titrated to 16/12 cm of water.  On BIPAP: 14/10 cm of water the resultant AHI was 5 in NREM sleep, however mean oximetry was 89%. On BiPAP: 16/12 cm of water mean oximetry improved to 91% SPO2 in REM sleep    Patient received a new BiPAP in August of 2020. First compliance report from 9/1/20-9/30/20 was downloaded and reviewed with the patient which showed BiPAP 8/4 cmH2O, Biflex 2, 100% compliance, 7 hrs 6 min use, AHI of 5.2. He is tolerating the pressure and mask well. He goes to bed between 12-1 am and wakes up at 8 am.  He sleeps well through the night.  He denies morning headache or snoring.  He checks his blood pressure at home routinely and also follows up with his PCP for management.  He takes lisinopril as directed.  He reports that he has gained 15 to 20 pounds since March 2020 due to COVID-19 and not being able to go to the gym.  He also would perform endurance bike rides ranging from 30 to 70 miles.  He hopes to get back to being more active.  He injured his back in 2016 and he has just recently in December 2019 fully recovered.  He went through physical therapy which was helpful.  He denies any new health problems or medications.      ROS:    Constitutional: Denies fevers, Denies weight changes  Eyes: Denies changes in vision, no eye pain  Ears/Nose/Throat/Mouth: Denies nasal congestion or sore throat   Cardiovascular: Denies chest pain or palpitations   Respiratory: Denies shortness of breath , Denies cough  Gastrointestinal/Hepatic: Denies abdominal pain,  nausea, vomiting,   Skin/Breast: Denies rash,   Neurological: Denies headache, confusion,   Psychiatric: denies mood disorder   Sleep: denies snoring       Past Medical History:   Diagnosis Date   • Allergic rhinitis    • Anesthesia     slow to wake up; ?apneic, needs encourangement to breathe   • Back pain    • Chickenpox    • [place] measles    • High cholesterol    • Hyperlipidemia    • Hypertension    • Influenza    • Mumps    • Nasal drainage    • Obesity    • Pulmonary hypertension (HCC)    • Sleep apnea 2015    unable to use bipap right now due to sinus issue   • Tonsillitis        Past Surgical History:   Procedure Laterality Date   • UMBILICAL HERNIA REPAIR  2/23/2015    Performed by John H Ganser, M.D. at SURGERY [facility]   • OTHER ORTHOPEDIC SURGERY  2009    Right Knee Repair   • OTHER ORTHOPEDIC SURGERY  1988    Right Meniscus   • [id number]     • NASAL RECONSTRUCTION     • NEPHRECTOMY LAPAROSCOPIC     • TONSILLECTOMY         Family History   Problem Relation Age of Onset   • Heart Attack Father    • Heart Disease Father    • Sleep Apnea Father        Social History     Socioeconomic History   • Marital status:      Spouse name: Not on file   • Number of children: Not on file   • Years of education: Not on file   • Highest education level: Not on file   Occupational History   • Not on file   Social Needs   • Financial resource strain: Not on file   • Food insecurity     Worry: Not on file     Inability: Not on file   • Transportation needs     Medical: Not on file     Non-medical: Not on file   Tobacco Use   • Smoking status: Never Smoker   • Smokeless tobacco: Never Used   Substance and Sexual Activity   • Alcohol use: Yes     Comment: 0-1 glasses of wine a week   • Drug use: No   • Sexual activity: Not on file   Lifestyle   • Physical activity     Days per week: Not on file     Minutes per session: Not on file   • Stress: Not on file   Relationships   • Social connections     Talks on phone:  Not on file     Gets together: Not on file     Attends Yarsani service: Not on file     Active member of club or organization: Not on file     Attends meetings of clubs or organizations: Not on file     Relationship status: Not on file   • Intimate partner violence     Fear of current or ex partner: Not on file     Emotionally abused: Not on file     Physically abused: Not on file     Forced sexual activity: Not on file   Other Topics Concern   • Not on file   Social History Narrative   • Not on file       Allergies as of 10/01/2020 - Reviewed 10/01/2020   Allergen Reaction Noted   • Crestor [rosuvastatin calcium]  12/28/2012   • Hydrocodone  02/20/2015        Vitals:  Vitals:    10/01/20 1118   BP: 130/72   Pulse: 60   SpO2: 95%       Current medications as of today   Current Outpatient Medications   Medication Sig Dispense Refill   • fluticasone (FLONASE) 50 MCG/ACT nasal spray      • cetirizine (ZYRTEC) 10 MG Tab Take 10 mg by mouth every day.     • therapeutic multivitamin-minerals (THERAGRAN-M) Tab Take 1 Tab by mouth every day.     • aspirin (ASA) 325 MG Tab Take 81 mg by mouth every 6 hours as needed.     • ascorbic acid (ASCORBIC ACID) 500 MG Tab Take 500 mg by mouth every day.     • lamoTRIgine (LAMICTAL) 25 MG Tab Take 1 Tab by mouth every day. 90 Tab 3   • ezetimibe (ZETIA) 10 MG Tab TK 1 T PO QD  5   • memantine (NAMENDA) 10 MG Tab Take 1 Tab by mouth 2 times a day. 180 Tab 2   • omeprazole (PRILOSEC) 20 MG delayed-release capsule Take 20 mg by mouth every day.     • vitamin D (CHOLECALCIFEROL) 1000 UNIT Tab Take 4,000 Units by mouth every day.     • lisinopril (PRINIVIL) 20 MG Tab Take 40 mg by mouth every day.     • niacin 500 MG Tab Take 1,000 mg by mouth every day.       No current facility-administered medications for this visit.          Physical Exam:   Appearance: Well developed, well nourished, no acute distress  Eyes: PERRL, EOM intact, sclera white, conjunctiva moist  Ears: no lesions or  deformities  Hearing: grossly intact  Nose: no lesions or deformities  Respiratory effort: no intercostal retractions or use of accessory muscles  Extremities: no cyanosis or edema  Abdomen: soft  Gait and Station: normal  Digits and nails: no clubbing, cyanosis, petechiae or nodes.  Cranial nerves: grossly intact  Skin: no visible rashes, lesions or ulcers noted  Orientation: Oriented to time, person and place  Mood and affect: mood and affect appropriate, normal interaction with examiner  Judgement: Intact    Assessment:  1. NARCISO (obstructive sleep apnea)     2. Hypertension, unspecified type     3. BMI 40.0-44.9, adult (HCC)           Plan  Discussed the cardiovascular and neuropsychiatric risks of untreated NARCISO; including but not limited to: HTN, DM, MI, ASCVD, CVA, CHF, traffic accidents.     1. First compliance report from 9/1/20-9/30/20 was downloaded and reviewed with the patient which showed BiPAP 8/4 cmH2O, Biflex 2, 100% compliance, 7 hrs 6 min use, AHI of 5.2. Continue BiPAP. Patient is compliant and benefiting from BiPAP therapy for management of NARCISO.   2. DME order ([facility]) for mask (FFM with comfort gel or MOC) and supplies was provided today. Continue to clean mask and supplies weekly, and change supplies per insurance guidelines.   3.  Encouraged routine exercise and healthy diet to help with weight loss and management.   4. Follow up with the appropriate healthcare practitioners for all other medical problems and issues.  5. Sleep hygiene discussed.    6.  Check blood pressure at home and follow-up with PCP for continued management.  Continue take blood pressure medication as directed.  7. F/u in 1 year, sooner if needed.       MACK Lloyd.      This dictation was created using voice recognition software. The accuracy of the dictation is limited to the abilities of the software. I expect there may be some errors of grammar and possibly content.       no indicators present

## 2020-10-05 RX ORDER — LAMOTRIGINE 25 MG/1
25 TABLET ORAL DAILY
Qty: 90 TAB | Refills: 3 | Status: SHIPPED | OUTPATIENT
Start: 2020-10-05 | End: 2020-11-11 | Stop reason: SDUPTHER

## 2020-10-05 NOTE — TELEPHONE ENCOUNTER
Received request via: Pharmacy    Was the patient seen in the last year in this department? Yes    Does the patient have an active prescription (recently filled or refills available) for medication(s) requested? No     Has appointment with Dr Clarke 10/27/2020    Thanks

## 2020-10-23 ENCOUNTER — OFFICE VISIT (OUTPATIENT)
Dept: NEUROLOGY | Facility: MEDICAL CENTER | Age: 65
End: 2020-10-23
Payer: COMMERCIAL

## 2020-10-23 VITALS
BODY MASS INDEX: 40.69 KG/M2 | RESPIRATION RATE: 16 BRPM | SYSTOLIC BLOOD PRESSURE: 128 MMHG | DIASTOLIC BLOOD PRESSURE: 82 MMHG | TEMPERATURE: 98.3 F | HEART RATE: 75 BPM | HEIGHT: 69 IN | WEIGHT: 274.69 LBS | OXYGEN SATURATION: 96 %

## 2020-10-23 DIAGNOSIS — I10 HYPERTENSION, UNSPECIFIED TYPE: ICD-10-CM

## 2020-10-23 DIAGNOSIS — R41.82 ALTERED MENTAL STATUS, UNSPECIFIED ALTERED MENTAL STATUS TYPE: ICD-10-CM

## 2020-10-23 DIAGNOSIS — G31.84 MCI (MILD COGNITIVE IMPAIRMENT) WITH MEMORY LOSS: ICD-10-CM

## 2020-10-23 DIAGNOSIS — R41.3 MEMORY PROBLEM: ICD-10-CM

## 2020-10-23 DIAGNOSIS — G47.33 OBSTRUCTIVE SLEEP APNEA SYNDROME: ICD-10-CM

## 2020-10-23 DIAGNOSIS — R68.89 SPELLS OF DECREASED ATTENTIVENESS: ICD-10-CM

## 2020-10-23 DIAGNOSIS — M79.2 NERVE PAIN: ICD-10-CM

## 2020-10-23 DIAGNOSIS — R41.89 COGNITIVE DECLINE: ICD-10-CM

## 2020-10-23 PROCEDURE — 99215 OFFICE O/P EST HI 40 MIN: CPT | Performed by: STUDENT IN AN ORGANIZED HEALTH CARE EDUCATION/TRAINING PROGRAM

## 2020-10-23 ASSESSMENT — PATIENT HEALTH QUESTIONNAIRE - PHQ9: CLINICAL INTERPRETATION OF PHQ2 SCORE: 0

## 2020-10-24 RX ORDER — MEMANTINE HYDROCHLORIDE 10 MG/1
10 TABLET ORAL 2 TIMES DAILY
Qty: 180 TAB | Refills: 3 | Status: SHIPPED | OUTPATIENT
Start: 2020-10-24 | End: 2020-11-11 | Stop reason: SDUPTHER

## 2020-10-28 ENCOUNTER — HOSPITAL ENCOUNTER (OUTPATIENT)
Dept: LAB | Facility: MEDICAL CENTER | Age: 65
End: 2020-10-28
Attending: STUDENT IN AN ORGANIZED HEALTH CARE EDUCATION/TRAINING PROGRAM
Payer: COMMERCIAL

## 2020-10-28 DIAGNOSIS — R41.3 MEMORY PROBLEM: ICD-10-CM

## 2020-10-28 DIAGNOSIS — M79.2 NERVE PAIN: ICD-10-CM

## 2020-10-28 DIAGNOSIS — R41.82 ALTERED MENTAL STATUS, UNSPECIFIED ALTERED MENTAL STATUS TYPE: ICD-10-CM

## 2020-10-28 DIAGNOSIS — R41.89 COGNITIVE DECLINE: ICD-10-CM

## 2020-10-28 DIAGNOSIS — I10 HYPERTENSION, UNSPECIFIED TYPE: ICD-10-CM

## 2020-10-28 PROBLEM — R73.03 PRE-DIABETES: Status: ACTIVE | Noted: 2020-10-28

## 2020-10-28 LAB
ALBUMIN SERPL BCP-MCNC: 4.6 G/DL (ref 3.2–4.9)
ALBUMIN/GLOB SERPL: 1.6 G/DL
ALP SERPL-CCNC: 84 U/L (ref 30–99)
ALT SERPL-CCNC: 36 U/L (ref 2–50)
ANION GAP SERPL CALC-SCNC: 9 MMOL/L (ref 7–16)
AST SERPL-CCNC: 24 U/L (ref 12–45)
BASOPHILS # BLD AUTO: 0.5 % (ref 0–1.8)
BASOPHILS # BLD: 0.04 K/UL (ref 0–0.12)
BILIRUB SERPL-MCNC: 0.5 MG/DL (ref 0.1–1.5)
BUN SERPL-MCNC: 17 MG/DL (ref 8–22)
CALCIUM SERPL-MCNC: 9.7 MG/DL (ref 8.5–10.5)
CHLORIDE SERPL-SCNC: 105 MMOL/L (ref 96–112)
CHOLEST SERPL-MCNC: 246 MG/DL (ref 100–199)
CO2 SERPL-SCNC: 29 MMOL/L (ref 20–33)
CREAT SERPL-MCNC: 1.19 MG/DL (ref 0.5–1.4)
CRP SERPL HS-MCNC: 1.7 MG/L (ref 0–7.5)
EOSINOPHIL # BLD AUTO: 0.16 K/UL (ref 0–0.51)
EOSINOPHIL NFR BLD: 1.8 % (ref 0–6.9)
ERYTHROCYTE [DISTWIDTH] IN BLOOD BY AUTOMATED COUNT: 45.7 FL (ref 35.9–50)
ERYTHROCYTE [SEDIMENTATION RATE] IN BLOOD BY WESTERGREN METHOD: 2 MM/HOUR (ref 0–20)
EST. AVERAGE GLUCOSE BLD GHB EST-MCNC: 126 MG/DL
FOLATE SERPL-MCNC: 30.1 NG/ML
GLOBULIN SER CALC-MCNC: 2.8 G/DL (ref 1.9–3.5)
GLUCOSE SERPL-MCNC: 95 MG/DL (ref 65–99)
HBA1C MFR BLD: 6 % (ref 0–5.6)
HCT VFR BLD AUTO: 53 % (ref 42–52)
HDLC SERPL-MCNC: 38 MG/DL
HGB BLD-MCNC: 18 G/DL (ref 14–18)
HIV 1+2 AB+HIV1 P24 AG SERPL QL IA: NORMAL
IMM GRANULOCYTES # BLD AUTO: 0.02 K/UL (ref 0–0.11)
IMM GRANULOCYTES NFR BLD AUTO: 0.2 % (ref 0–0.9)
LDLC SERPL CALC-MCNC: 139 MG/DL
LYMPHOCYTES # BLD AUTO: 2.4 K/UL (ref 1–4.8)
LYMPHOCYTES NFR BLD: 27.4 % (ref 22–41)
MCH RBC QN AUTO: 31.6 PG (ref 27–33)
MCHC RBC AUTO-ENTMCNC: 34 G/DL (ref 33.7–35.3)
MCV RBC AUTO: 93 FL (ref 81.4–97.8)
MONOCYTES # BLD AUTO: 0.84 K/UL (ref 0–0.85)
MONOCYTES NFR BLD AUTO: 9.6 % (ref 0–13.4)
NEUTROPHILS # BLD AUTO: 5.3 K/UL (ref 1.82–7.42)
NEUTROPHILS NFR BLD: 60.5 % (ref 44–72)
NRBC # BLD AUTO: 0 K/UL
NRBC BLD-RTO: 0 /100 WBC
PLATELET # BLD AUTO: 209 K/UL (ref 164–446)
PMV BLD AUTO: 10.9 FL (ref 9–12.9)
POTASSIUM SERPL-SCNC: 4.6 MMOL/L (ref 3.6–5.5)
PROT SERPL-MCNC: 7.4 G/DL (ref 6–8.2)
RBC # BLD AUTO: 5.7 M/UL (ref 4.7–6.1)
SODIUM SERPL-SCNC: 143 MMOL/L (ref 135–145)
TREPONEMA PALLIDUM IGG+IGM AB [PRESENCE] IN SERUM OR PLASMA BY IMMUNOASSAY: NORMAL
TRIGL SERPL-MCNC: 347 MG/DL (ref 0–149)
TSH SERPL DL<=0.005 MIU/L-ACNC: 1.11 UIU/ML (ref 0.38–5.33)
VIT B12 SERPL-MCNC: 747 PG/ML (ref 211–911)
WBC # BLD AUTO: 8.8 K/UL (ref 4.8–10.8)

## 2020-10-28 PROCEDURE — 82746 ASSAY OF FOLIC ACID SERUM: CPT

## 2020-10-28 PROCEDURE — 86141 C-REACTIVE PROTEIN HS: CPT

## 2020-10-28 PROCEDURE — 84425 ASSAY OF VITAMIN B-1: CPT

## 2020-10-28 PROCEDURE — 85652 RBC SED RATE AUTOMATED: CPT

## 2020-10-28 PROCEDURE — 80061 LIPID PANEL: CPT

## 2020-10-28 PROCEDURE — 80053 COMPREHEN METABOLIC PANEL: CPT

## 2020-10-28 PROCEDURE — 36415 COLL VENOUS BLD VENIPUNCTURE: CPT

## 2020-10-28 PROCEDURE — 83036 HEMOGLOBIN GLYCOSYLATED A1C: CPT

## 2020-10-28 PROCEDURE — 84443 ASSAY THYROID STIM HORMONE: CPT

## 2020-10-28 PROCEDURE — 85025 COMPLETE CBC W/AUTO DIFF WBC: CPT

## 2020-10-28 PROCEDURE — 82607 VITAMIN B-12: CPT

## 2020-10-28 PROCEDURE — 87389 HIV-1 AG W/HIV-1&-2 AB AG IA: CPT

## 2020-10-28 PROCEDURE — 86780 TREPONEMA PALLIDUM: CPT

## 2020-10-28 PROCEDURE — 84591 ASSAY OF NOS VITAMIN: CPT

## 2020-11-01 LAB — VIT B1 BLD-MCNC: 178 NMOL/L (ref 70–180)

## 2020-11-01 NOTE — PROGRESS NOTES
GENERAL NEUROLOGY CLINIC INITIAL ENCOUNTER  CHIEF COMPLAINT(S): Short term memory impairment    Problem List Items Addressed This Visit     Hypertension    Relevant Orders    VIT B12,  FOLIC ACID    VITAMIN B1 (Completed)    VITAMIN B3 NIACIN (Completed)    TSH (Completed)    CRP HIGH SENSITIVE    Sed Rate (Completed)    HIV AG/AB COMBO ASSAY SCREENING (Completed)    HEMOGLOBIN A1C (Completed)    LIPID PANEL    CBC WITH DIFFERENTIAL (Completed)    Comp Metabolic Panel (Completed)    Memory problem    Relevant Orders    VIT B12,  FOLIC ACID    VITAMIN B1 (Completed)    VITAMIN B3 NIACIN (Completed)    TSH (Completed)    CRP HIGH SENSITIVE    Sed Rate (Completed)    HIV AG/AB COMBO ASSAY SCREENING (Completed)    HEMOGLOBIN A1C (Completed)    LIPID PANEL    EEG Video 24 HR    CBC WITH DIFFERENTIAL (Completed)    Comp Metabolic Panel (Completed)    Spells of decreased attentiveness      Other Visit Diagnoses     Cognitive decline        Relevant Orders    VIT B12,  FOLIC ACID    VITAMIN B1 (Completed)    VITAMIN B3 NIACIN (Completed)    TSH (Completed)    CRP HIGH SENSITIVE    Sed Rate (Completed)    HIV AG/AB COMBO ASSAY SCREENING (Completed)    HEMOGLOBIN A1C (Completed)    LIPID PANEL    EEG Video 24 HR    CBC WITH DIFFERENTIAL (Completed)    Comp Metabolic Panel (Completed)    Altered mental status, unspecified altered mental status type        Relevant Orders    VIT B12,  FOLIC ACID    VITAMIN B1 (Completed)    VITAMIN B3 NIACIN (Completed)    TSH (Completed)    CRP HIGH SENSITIVE    Sed Rate (Completed)    HIV AG/AB COMBO ASSAY SCREENING (Completed)    HEMOGLOBIN A1C (Completed)    LIPID PANEL    EEG Video 24 HR    CBC WITH DIFFERENTIAL (Completed)    Comp Metabolic Panel (Completed)    Nerve pain        Relevant Orders    VIT B12,  FOLIC ACID    VITAMIN B1 (Completed)    VITAMIN B3 NIACIN (Completed)    TSH (Completed)    CRP HIGH SENSITIVE    Sed Rate (Completed)    HIV AG/AB COMBO ASSAY SCREENING (Completed)     HEMOGLOBIN A1C (Completed)    LIPID PANEL    CBC WITH DIFFERENTIAL (Completed)    Comp Metabolic Panel (Completed)    Obstructive sleep apnea syndrome        MCI (mild cognitive impairment) with memory loss              History of present illness:  Yadiel Moody 64 y.o. male presents today to establish care with me in neurology clinic regarding ongoing short-term memory problems.  He was last seen in our neurology clinic by Dr. Morales in February 2020.  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  There is also concerned that he has subclinical seizures and so he remains on a low dose of Lamictal 25 mg twice daily.  He continues to tolerate this medication.  He is unsure if the medications have helped with spells of nonspecific fuzzy feeling, confusion episodes. Thinks they continue.  Thinks that they may be associated with fasting which she still does, eats 1 meal a day.  Cannot quantify the frequency that they are happening.  May be a couple times a month, sometimes less sometimes more.  But no major changes that he has noticed otherwise.  He feels healthy and has not had any significant health setbacks.  Functionally he thinks he is doing just about the same, with no problems performing activities of daily living.  Able to do household chores, cook dinner, do laundry.    Reviewed medications with patient.  Reviewed other past medical history, past surgical history, family history, social history, allergies.  Pertinent positives include a non-smoker.  He does have a family history of heart disease and has himself obstructive sleep apnea.  I told him these place him at a greater risk of strokes, heart attacks, and accelerating cognitive decline.  Says he is sleeping better and continues to use CPAP.  Feels that this may be helping.  I stressed the importance of maintaining a healthy lifestyle by close monitoring of his blood pressure, ideally multiple times  a day in the morning, afternoon, and prior to going to bed at least with a home blood pressure monitor.  He must have regular checking for diabetes.  They consistently healthy diet composed of green leafy vegetables, low in fats and simple sugars, and high and healthy fats by eating knots and fish were discussed.  This will improve his cholesterol and decrease his risk of atherosclerosis which can increase risk of cardiovascular/cerebrovascular disease.  Also noted was macular degeneration of the left eye.  Also of note is that he has a severe intolerance to statins, particularly rosuvastatin.  Other review of systems: Mild bilateral foot pain at night, burning sensation, mild.    Asked about exercising which she still not doing as much as he would like to.  Plans to go back to the gym.  I encouraged him to do so as regular aerobic exercise can help with mood, high blood pressure, cholesterol, and protect the brain from cognitive decline.    Denies any fever, chills, night sweats, cough, nausea, vomiting, changes in bowel habits such as diarrhea or constipation, new/worsening joint aches or muscle aches, rashes or infections.  No issues with eating chewing or swallowing.  No issues with balance/walking.    Patient is on Namenda 10 mg twice daily.  Unsure if this is helped in any meaningful way.  Has not noticed any bothersome side effects.    Patient does not have a primary care provider.          Past medical history:   Past Medical History:   Diagnosis Date   • Allergic rhinitis    • Anesthesia     slow to wake up; ?apneic, needs encourangement to breathe   • Back pain    • Chickenpox    • [place] measles    • High cholesterol    • Hyperlipidemia    • Hypertension    • Influenza    • Mumps    • Nasal drainage    • Obesity    • Pulmonary hypertension (HCC)    • Sleep apnea 2015    unable to use bipap right now due to sinus issue   • Tonsillitis        Past surgical history:   Past Surgical History:   Procedure  Laterality Date   • UMBILICAL HERNIA REPAIR  2/23/2015    Performed by John H Ganser, M.D. at SURGERY [facility]   • OTHER ORTHOPEDIC SURGERY  2009    Right Knee Repair   • OTHER ORTHOPEDIC SURGERY  1988    Right Meniscus   • [id number]     • NASAL RECONSTRUCTION     • NEPHRECTOMY LAPAROSCOPIC     • TONSILLECTOMY         Family history:   Family History   Problem Relation Age of Onset   • Heart Attack Father    • Heart Disease Father    • Sleep Apnea Father        Social history:   Social History     Socioeconomic History   • Marital status:      Spouse name: Not on file   • Number of children: Not on file   • Years of education: Not on file   • Highest education level: Not on file   Occupational History   • Not on file   Social Needs   • Financial resource strain: Not on file   • Food insecurity     Worry: Not on file     Inability: Not on file   • Transportation needs     Medical: Not on file     Non-medical: Not on file   Tobacco Use   • Smoking status: Never Smoker   • Smokeless tobacco: Never Used   Substance and Sexual Activity   • Alcohol use: Yes     Comment: 0-1 glasses of wine a week   • Drug use: No   • Sexual activity: Not on file   Lifestyle   • Physical activity     Days per week: Not on file     Minutes per session: Not on file   • Stress: Not on file   Relationships   • Social connections     Talks on phone: Not on file     Gets together: Not on file     Attends Bahai service: Not on file     Active member of club or organization: Not on file     Attends meetings of clubs or organizations: Not on file     Relationship status: Not on file   • Intimate partner violence     Fear of current or ex partner: Not on file     Emotionally abused: Not on file     Physically abused: Not on file     Forced sexual activity: Not on file   Other Topics Concern   • Not on file   Social History Narrative   • Not on file       Current medications:   Current Outpatient Medications   Medication   •  "memantine (NAMENDA) 10 MG Tab   • lamoTRIgine (LAMICTAL) 25 MG Tab   • fluticasone (FLONASE) 50 MCG/ACT nasal spray   • cetirizine (ZYRTEC) 10 MG Tab   • therapeutic multivitamin-minerals (THERAGRAN-M) Tab   • aspirin (ASA) 325 MG Tab   • ascorbic acid (ASCORBIC ACID) 500 MG Tab   • ezetimibe (ZETIA) 10 MG Tab   • omeprazole (PRILOSEC) 20 MG delayed-release capsule   • vitamin D (CHOLECALCIFEROL) 1000 UNIT Tab   • lisinopril (PRINIVIL) 20 MG Tab   • niacin 500 MG Tab     No current facility-administered medications for this visit.        Medication Allergy:  Allergies   Allergen Reactions   • Crestor [Rosuvastatin Calcium]      Severe side effects         Review of systems:   Pertinent positives and negatives are as outlined above    Physical examination:   Vitals:    10/23/20 0955   BP: 128/82   BP Location: Left arm   Patient Position: Sitting   BP Cuff Size: Adult   Pulse: 75   Resp: 16   Temp: 36.8 °C (98.3 °F)   TempSrc: Temporal   SpO2: 96%   Weight: 124.6 kg (274 lb 11.1 oz)   Height: 1.753 m (5' 9\")     General: Patient in no acute distress, pleasant and cooperative.  Appropriately dressed with good hygiene.  Appears as stated age.  HEENT: Normocephalic, no signs of acute trauma.   Neck: Not examined  Chest: clear to auscultation. Symmetrical chest rise with inhalation. No cough.   CV: RRR, no murmurs.   Skin: no signs of acute rashes or trauma.   Musculoskeletal: joints exhibit full range of motion. There are no signs of joint or muscle swelling.   Psychiatric: No hallucinatory behavior. Denies symptoms of depression or suicidal ideation. Mood and affect appear normal on exam.     NEUROLOGICAL EXAM:   Mental status:  MOCA 30/30  Frontal release signs none  Speech and language: speech is clear and fluent. The patient is able to name, repeat and comprehend. No impairment in fluency. No word substitions or paraphasic errors  Memory: There is intact recollection of recent and remote events.   Cranial nerve " exam:   I: smell Not tested   II: visual acuity   not tested   II: visual fields Full to confrontation  Visual neglect absent   II: pupils Equal, round, reactive to light   III,VII: ptosis None   III,IV,VI: extraocular muscles  Full ROM   V: mastication Normal   V: facial light touch sensation  Normal   V,VII: corneal reflex   not tested   VII: facial muscle function - upper  Normal   VII: facial muscle function - lower Normal   VIII: hearing Not tested   IX: soft palate elevation  Normal   IX,X: gag reflex  not tested   XI: trapezius strength  5/5   XI: sternocleidomastoid strength 5/5   XI: neck flexion strength  5/5   XII: tongue strength  Normal     Motor exam:   • Strength is 5/5 in the distal and proximal upper and lower extremities   • Tone is normal.  • No abnormal movements were seen on exam.   • No muscle fasciculation  • No areas of atrophy  • Tests of praxis absent  Sensory exam reveals normal sense of light touch, proprioception, vibration and pinprick in all extremities. Cortical sensory testing abnormalities: None. Test of neglect absent  Deep tendon reflexes:  2+ throughout. Plantar responses are flexor.There is no clonus.   Coordination: shows a normal finger-nose-finger. Normal rapidly alternating movements. Heel-knee-shin movements smooth and coordinated bilaterally.   Gait:   • The patient was able to get up from seated position on first attempt without requiring assistance.   • Found to be steady when walking.   • Movements were fluid with normal arm swing.   • The patient was able to turn without difficulties or tendency to fall.   • Romberg exam negative        ANCILLARY DATA REVIEWED:       Lab Data Review:  All labs reviewed dating back to 2013    Records reviewed:   Imaging:   I personally reviewed patient's MRI from November 2017.  I agree with the neuroradiologist report as described: Age-related cerebral atrophy with mild periventricular white matter changes consistent with chronic  microvascular ischemic gliosis.    EEG:  routine EEG 3/11/20      ASSESSMENT/PLAN, EDUCATIONG, AND COUNSELING:  This is a 64-year-old male who I am meeting for the first time with primary complaints of short-term memory problems.  Overall patient appears stable, may be even slightly improved.  He has a nonfocal exam.  His MoCA score is 30 out of 30.  His mood remains good.  His sleeping quality has improved with consistent use of CPAP for his obstructive sleep apnea.  I did  on the importance of continued use of the machine especially since sleep apnea that is not treated is associated with a higher risk of stroke, heart disease, mood problems, and cognitive problems.  And its possible that his chronically poor sleep in the past contributed to some extent to some of his cognitive complaints.  Also stressed the importance of maintaining a healthy lifestyle.  I encouraged. Him to join a gym or start by establishing a routine of daily walking.  Talked about the importance of controlling blood pressure, having routine checks for diabetes, maintaining a healthy diet, checking cholesterol routinely, and continued stimulation of the brain with puzzles, reading, learning new hobbies.  He does not have a primary care provider and I think that it is important that he have a primary care doctor who can help manage the nonneurological aspects of his health more closely.  So I will place this referral.  Since there is been no major changes for the patient, and since he does not have any focal exam findings, I do not see the need to repeat an MRI brain but we can consider doing this in the future should something change.  I do not see that patient has had a neuropsych evaluation.  Begin consider obtaining this at least to get a good baseline and to determine more precisely subtle deficits that may be missed in a regular office visit.  Again, because he is stable we can defer for now and address next visit.  I am concerned  about his ongoing episodes of fuzziness, confusion.  This is a very vague and nonspecific symptom but certainly seizures is one possibility.  Other possibilities include metabolic derangements occurring episodically especially with his atypical diet.  Electrolyte derangements, transient glucose fluctuations, acidosis, among other things could also be at play.  Given the question of his symptoms and the left-sided nonspecific abnormalities seen on his EEG, I think a more prolonged ambulatory EEG would be appropriate in the hopes of trying to capture one of his spells.  Patient is agreeable to this.  Will will recheck some vitamin levels to evaluate for reversible causes of cognitive impairment which he has not had for at least several months.  We will also repeat some other labs as part of healthy screening.  We will follow up in 3 months and review his results.  Patient was in agreement with this plan    1. Memory problem  - VIT B12,  FOLIC ACID  - VITAMIN B1; Future  - VITAMIN B3 NIACIN; Future  - TSH; Future  - CRP HIGH SENSITIVE  - Sed Rate; Future  - HIV AG/AB COMBO ASSAY SCREENING; Future  - HEMOGLOBIN A1C; Future  - LIPID PANEL  - EEG Video 24 HR  - CBC WITH DIFFERENTIAL; Future  - Comp Metabolic Panel; Future    2. Cognitive decline  - VIT B12,  FOLIC ACID  - VITAMIN B1; Future  - VITAMIN B3 NIACIN; Future  - TSH; Future  - CRP HIGH SENSITIVE  - Sed Rate; Future  - HIV AG/AB COMBO ASSAY SCREENING; Future  - HEMOGLOBIN A1C; Future  - LIPID PANEL  - EEG Video 24 HR  - CBC WITH DIFFERENTIAL; Future  - Comp Metabolic Panel; Future    3. Altered mental status, unspecified altered mental status type  - VIT B12,  FOLIC ACID  - VITAMIN B1; Future  - VITAMIN B3 NIACIN; Future  - TSH; Future  - CRP HIGH SENSITIVE  - Sed Rate; Future  - HIV AG/AB COMBO ASSAY SCREENING; Future  - HEMOGLOBIN A1C; Future  - LIPID PANEL  - EEG Video 24 HR  - CBC WITH DIFFERENTIAL; Future  - Comp Metabolic Panel; Future    4. Hypertension,  unspecified type  - VIT B12,  FOLIC ACID  - VITAMIN B1; Future  - VITAMIN B3 NIACIN; Future  - TSH; Future  - CRP HIGH SENSITIVE  - Sed Rate; Future  - HIV AG/AB COMBO ASSAY SCREENING; Future  - HEMOGLOBIN A1C; Future  - LIPID PANEL  - CBC WITH DIFFERENTIAL; Future  - Comp Metabolic Panel; Future    5. Nerve pain  - VIT B12,  FOLIC ACID  - VITAMIN B1; Future  - VITAMIN B3 NIACIN; Future  - TSH; Future  - CRP HIGH SENSITIVE  - Sed Rate; Future  - HIV AG/AB COMBO ASSAY SCREENING; Future  - HEMOGLOBIN A1C; Future  - LIPID PANEL  - CBC WITH DIFFERENTIAL; Future  - Comp Metabolic Panel; Future    6. Obstructive sleep apnea syndrome    7. Spells of decreased attentiveness    8. MCI (mild cognitive impairment) with memory loss    Other orders  - memantine (NAMENDA) 10 MG Tab; Take 1 Tab by mouth 2 times a day.  Dispense: 180 Tab; Refill: 3     -Other things to note next visit: Follow-up ambulatory EEG, consider repeating MRI if changes noted, ask about finding a PCP, great evaluate the need for neuropsychological testing, review labs    FOLLOW-UP:   Return in about 3 months (around 1/23/2021).            Lexx Clarke MD  Epilepsy and General Neurology  Department of Neurology  Clinical  of Neurology [facility].     BILLING DOCUMENTATION:       Counseling:  I spent a total of 43 minutes of face-to-face time in this visit. Over 50% of the time of the visit today was spent on counseling and or coordination of care wtih the patient and/or family, as above in assessment in plan.

## 2020-11-09 LAB — NIACIN SERPL-MCNC: 3.72 UG/ML (ref 0.5–8.45)

## 2020-11-10 NOTE — TELEPHONE ENCOUNTER
Received request via: Pharmacy    Was the patient seen in the last year in this department? Yes    Does the patient have an active prescription (recently filled or refills available) for medication(s) requested? Yes.   Pt requesting 90 days supply.  States last scripts were not received by pharmacy

## 2020-11-11 DIAGNOSIS — R41.89 COGNITIVE DECLINE: ICD-10-CM

## 2020-11-11 DIAGNOSIS — R68.89 SPELLS OF DECREASED ATTENTIVENESS: ICD-10-CM

## 2020-11-11 DIAGNOSIS — R41.3 MEMORY PROBLEM: ICD-10-CM

## 2020-11-11 RX ORDER — LAMOTRIGINE 25 MG/1
25 TABLET ORAL DAILY
Qty: 90 TAB | Refills: 3 | OUTPATIENT
Start: 2020-11-11

## 2020-11-11 RX ORDER — MEMANTINE HYDROCHLORIDE 10 MG/1
10 TABLET ORAL 2 TIMES DAILY
Qty: 180 TAB | Refills: 3 | Status: CANCELLED | OUTPATIENT
Start: 2020-11-11

## 2020-11-11 RX ORDER — LAMOTRIGINE 25 MG/1
25 TABLET ORAL DAILY
Qty: 90 TAB | Refills: 3 | Status: SHIPPED | OUTPATIENT
Start: 2020-11-11 | End: 2020-11-13 | Stop reason: SDUPTHER

## 2020-11-11 RX ORDER — MEMANTINE HYDROCHLORIDE 10 MG/1
10 TABLET ORAL 2 TIMES DAILY
Qty: 180 TAB | Refills: 3 | Status: SHIPPED | OUTPATIENT
Start: 2020-11-11 | End: 2020-11-13 | Stop reason: SDUPTHER

## 2020-11-12 RX ORDER — LAMOTRIGINE 25 MG/1
25 TABLET ORAL DAILY
Qty: 90 TAB | Refills: 3 | Status: CANCELLED | OUTPATIENT
Start: 2020-11-12

## 2020-11-12 RX ORDER — MEMANTINE HYDROCHLORIDE 10 MG/1
10 TABLET ORAL 2 TIMES DAILY
Qty: 180 TAB | Refills: 3 | Status: CANCELLED | OUTPATIENT
Start: 2020-11-12

## 2020-11-13 ENCOUNTER — TELEPHONE (OUTPATIENT)
Dept: NEUROLOGY | Facility: MEDICAL CENTER | Age: 65
End: 2020-11-13

## 2020-11-13 DIAGNOSIS — R68.89 SPELLS OF DECREASED ATTENTIVENESS: ICD-10-CM

## 2020-11-13 DIAGNOSIS — R41.89 COGNITIVE DECLINE: ICD-10-CM

## 2020-11-13 DIAGNOSIS — R41.3 MEMORY PROBLEM: ICD-10-CM

## 2020-11-13 RX ORDER — LAMOTRIGINE 25 MG/1
25 TABLET ORAL DAILY
Qty: 90 TAB | Refills: 3 | Status: SHIPPED
Start: 2020-11-13 | End: 2020-11-13

## 2020-11-13 RX ORDER — MEMANTINE HYDROCHLORIDE 10 MG/1
10 TABLET ORAL 2 TIMES DAILY
Qty: 180 TAB | Refills: 3 | Status: SHIPPED | OUTPATIENT
Start: 2020-11-13 | End: 2021-01-27

## 2020-11-13 RX ORDER — MEMANTINE HYDROCHLORIDE 10 MG/1
10 TABLET ORAL 2 TIMES DAILY
Qty: 180 TAB | Refills: 3 | Status: SHIPPED | OUTPATIENT
Start: 2020-11-13 | End: 2020-11-13 | Stop reason: SDUPTHER

## 2020-11-13 RX ORDER — LAMOTRIGINE 25 MG/1
25 TABLET ORAL DAILY
Qty: 90 TAB | Refills: 3 | Status: SHIPPED | OUTPATIENT
Start: 2020-11-13 | End: 2020-11-13 | Stop reason: SDUPTHER

## 2020-11-13 RX ORDER — LAMOTRIGINE 25 MG/1
25 TABLET ORAL DAILY
Qty: 90 TAB | Refills: 5 | Status: SHIPPED | OUTPATIENT
Start: 2020-11-13 | End: 2021-09-24

## 2020-11-13 RX ORDER — MEMANTINE HYDROCHLORIDE 10 MG/1
10 TABLET ORAL 2 TIMES DAILY
Qty: 180 TAB | Refills: 3 | Status: SHIPPED
Start: 2020-11-13 | End: 2020-11-13

## 2021-01-11 ENCOUNTER — NON-PROVIDER VISIT (OUTPATIENT)
Dept: NEUROLOGY | Facility: MEDICAL CENTER | Age: 66
End: 2021-01-11
Attending: STUDENT IN AN ORGANIZED HEALTH CARE EDUCATION/TRAINING PROGRAM
Payer: COMMERCIAL

## 2021-01-11 DIAGNOSIS — R41.82 ALTERED MENTAL STATUS, UNSPECIFIED ALTERED MENTAL STATUS TYPE: ICD-10-CM

## 2021-01-11 PROCEDURE — 95700 EEG CONT REC W/VID EEG TECH: CPT | Performed by: STUDENT IN AN ORGANIZED HEALTH CARE EDUCATION/TRAINING PROGRAM

## 2021-01-11 PROCEDURE — 95708 EEG WO VID EA 12-26HR UNMNTR: CPT | Performed by: STUDENT IN AN ORGANIZED HEALTH CARE EDUCATION/TRAINING PROGRAM

## 2021-01-11 PROCEDURE — 95719 EEG PHYS/QHP EA INCR W/O VID: CPT | Performed by: STUDENT IN AN ORGANIZED HEALTH CARE EDUCATION/TRAINING PROGRAM

## 2021-01-11 NOTE — PROCEDURES
AMBULATORY ELECTROENCEPHALOGRAM REPORT      Referring provider:   Lexx Clarke M.D.  [address] [address]  [place] [id number]      DOS: [id number]      Duration of Recording: (23 hours and 34 minutes)      INDICATION:  Yadiel Moody 65 y.o. male presenting with altered mental status      CURRENT OUTPATIENT MEDICATION LIST:  Current Outpatient Medications   Medication Instructions   • ascorbic acid (ASCORBIC ACID) 500 mg, Oral, DAILY   • aspirin (ASA) 81 mg, Oral, EVERY 6 HOURS PRN   • cetirizine (ZYRTEC) 10 mg, Oral, DAILY   • ezetimibe (ZETIA) 10 MG Tab TK 1 T PO QD   • fluticasone (FLONASE) 50 MCG/ACT nasal spray No dose, route, or frequency recorded.   • lamoTRIgine (LAMICTAL) 25 mg, Oral, DAILY   • lisinopril (PRINIVIL) 40 mg, Oral, DAILY   • memantine (NAMENDA) 10 mg, Oral, 2 TIMES DAILY   • niacin 1,000 mg, Oral, DAILY   • omeprazole (PRILOSEC) 20 mg, Oral, DAILY   • therapeutic multivitamin-minerals (THERAGRAN-M) Tab 1 Tab, Oral, DAILY   • vitamin D (CHOLECALCIFEROL) 4,000 Units, Oral, DAILY         TECHNIQUE: A 30-channel ambulatory electroencephalogram (aEEG) was performed in accordance with the international 10-20 system. This digital study was reviewed in bipolar and referential montages. The recording examined the patient during wakeful, drowsy and sleep states.     DESCRIPTION OF THE RECORD:  During the awake state, background shows symmetrical 9 Hz alpha activity posteriorly with amplitude of 70 mV.  There was reactivity with eye opening/closure.  Normal anterior-posterior gradient was noted with faster beta frequencies seen anteriorly.  During drowsiness, high-amplitude delta frequencies were seen.    During the sleep state, background shows diffuse high-amplitude 4-5 Hz delta activity.  Symmetrical high-amplitude sleep spindles and vertex sharp activities were seen in the central leads.    ACTIVATION PROCEDURES:   Hyperventilation was performed by the patient for a total of 3 minutes. The  technician performing the test noted good effort. This technique produced electroencephalographic changes in keeping with the expected bilaterally synchronous, frontally predominant, high amplitude slow waves build up.     Intermittent Photic stimulation was performed in a stepwise fashion from 1 to 30 Hz and elicited a normal response (photic driving), most noticeable in the posterior leads.    ICTAL AND INTERICTAL FINDINGS:   No focal or generalized epileptiform activity noted.     No regional slowing was seen during this routine study.      No clinical events or seizures were reported or recorded during the study.     EKG: sampling of the EKG recording did not demonstrate any abnormalities    EVENTS:      Multiple push button events and/or ambulatory diary events noted at the following times below:    0521-7224 AM - Lost in conversation, lost train of thought  d1 11:28:16  Patient Event  d1 14:48:56  Patient Event - lost words  d1 20:38:13  Patient Event  d2 07:28:49  Patient Event  d2 07:48:06  Patient Event    Multiple push-button events and/or ambulatory diary entries with no abnormal EEG correlate    INTERPRETATION:  This is a normal ambulatory EEG recording in the awake and drowsy/sleep state(s). There were multiple push-button events and/or ambulatory diary entries as noted above with no abnormal EEG correlate. Clinical correlation is recommended.            Lexx Clarke MD  Epilepsy and General Neurology  Department of Neurology  Clinical  of Neurology [facility].

## 2021-01-12 ENCOUNTER — NON-PROVIDER VISIT (OUTPATIENT)
Dept: NEUROLOGY | Facility: MEDICAL CENTER | Age: 66
End: 2021-01-12
Attending: STUDENT IN AN ORGANIZED HEALTH CARE EDUCATION/TRAINING PROGRAM
Payer: COMMERCIAL

## 2021-01-12 PROCEDURE — 99999 PR NO CHARGE: CPT | Performed by: STUDENT IN AN ORGANIZED HEALTH CARE EDUCATION/TRAINING PROGRAM

## 2021-01-27 ENCOUNTER — OFFICE VISIT (OUTPATIENT)
Dept: NEUROLOGY | Facility: MEDICAL CENTER | Age: 66
End: 2021-01-27
Attending: STUDENT IN AN ORGANIZED HEALTH CARE EDUCATION/TRAINING PROGRAM
Payer: COMMERCIAL

## 2021-01-27 VITALS
BODY MASS INDEX: 40.17 KG/M2 | DIASTOLIC BLOOD PRESSURE: 74 MMHG | HEART RATE: 78 BPM | WEIGHT: 272 LBS | TEMPERATURE: 98.1 F | OXYGEN SATURATION: 96 % | SYSTOLIC BLOOD PRESSURE: 134 MMHG

## 2021-01-27 DIAGNOSIS — R41.89 COGNITIVE DECLINE: ICD-10-CM

## 2021-01-27 DIAGNOSIS — E66.9 OBESITY (BMI 30-39.9): ICD-10-CM

## 2021-01-27 DIAGNOSIS — R41.3 MEMORY PROBLEM: ICD-10-CM

## 2021-01-27 DIAGNOSIS — G47.30 SLEEP APNEA, UNSPECIFIED TYPE: ICD-10-CM

## 2021-01-27 DIAGNOSIS — E87.8 HYPERCHLOREMIA: ICD-10-CM

## 2021-01-27 DIAGNOSIS — G31.84 MCI (MILD COGNITIVE IMPAIRMENT) WITH MEMORY LOSS: ICD-10-CM

## 2021-01-27 DIAGNOSIS — G47.33 OBSTRUCTIVE SLEEP APNEA SYNDROME: ICD-10-CM

## 2021-01-27 PROCEDURE — 99215 OFFICE O/P EST HI 40 MIN: CPT | Performed by: STUDENT IN AN ORGANIZED HEALTH CARE EDUCATION/TRAINING PROGRAM

## 2021-01-27 RX ORDER — MEMANTINE HYDROCHLORIDE 21 MG/1
21 CAPSULE, EXTENDED RELEASE ORAL DAILY
Qty: 30 CAP | Refills: 4 | Status: SHIPPED | OUTPATIENT
Start: 2021-01-27 | End: 2021-02-23 | Stop reason: DRUGHIGH

## 2021-01-27 ASSESSMENT — FIBROSIS 4 INDEX: FIB4 SCORE: 1.22

## 2021-02-23 ENCOUNTER — TELEPHONE (OUTPATIENT)
Dept: NEUROLOGY | Facility: MEDICAL CENTER | Age: 66
End: 2021-02-23

## 2021-02-23 DIAGNOSIS — G31.84 MCI (MILD COGNITIVE IMPAIRMENT) WITH MEMORY LOSS: ICD-10-CM

## 2021-02-23 DIAGNOSIS — R41.89 COGNITIVE DECLINE: ICD-10-CM

## 2021-02-23 DIAGNOSIS — R41.3 MEMORY PROBLEM: ICD-10-CM

## 2021-02-23 RX ORDER — MEMANTINE HYDROCHLORIDE 28 MG/1
28 CAPSULE, EXTENDED RELEASE ORAL DAILY
Qty: 30 CAPSULE | Refills: 5 | Status: SHIPPED | OUTPATIENT
Start: 2021-02-23 | End: 2021-08-25 | Stop reason: SDUPTHER

## 2021-02-23 NOTE — TELEPHONE ENCOUNTER
"Patient called leaving a detailed message about his Memantine ER 21 MG capsules . Per patient \"Dr Clarke specified the dosage will be increased \" I verified the most recent note from  01/27/2021 I do not see anything about the medication being increased . Please advise   "

## 2021-07-14 ENCOUNTER — OFFICE VISIT (OUTPATIENT)
Dept: NEUROLOGY | Facility: MEDICAL CENTER | Age: 66
End: 2021-07-14
Attending: STUDENT IN AN ORGANIZED HEALTH CARE EDUCATION/TRAINING PROGRAM
Payer: COMMERCIAL

## 2021-07-14 ENCOUNTER — TELEPHONE (OUTPATIENT)
Dept: NEUROLOGY | Facility: MEDICAL CENTER | Age: 66
End: 2021-07-14

## 2021-07-14 VITALS
OXYGEN SATURATION: 94 % | TEMPERATURE: 97.7 F | HEART RATE: 68 BPM | WEIGHT: 263 LBS | BODY MASS INDEX: 38.95 KG/M2 | HEIGHT: 69 IN | SYSTOLIC BLOOD PRESSURE: 118 MMHG | DIASTOLIC BLOOD PRESSURE: 70 MMHG

## 2021-07-14 DIAGNOSIS — Z12.11 SCREENING FOR COLORECTAL CANCER: ICD-10-CM

## 2021-07-14 DIAGNOSIS — G47.33 OBSTRUCTIVE SLEEP APNEA SYNDROME: ICD-10-CM

## 2021-07-14 DIAGNOSIS — E66.9 OBESITY (BMI 30-39.9): ICD-10-CM

## 2021-07-14 DIAGNOSIS — G31.84 MCI (MILD COGNITIVE IMPAIRMENT) WITH MEMORY LOSS: ICD-10-CM

## 2021-07-14 DIAGNOSIS — R73.03 PRE-DIABETES: ICD-10-CM

## 2021-07-14 DIAGNOSIS — I10 HYPERTENSION, UNSPECIFIED TYPE: ICD-10-CM

## 2021-07-14 DIAGNOSIS — R41.3 MEMORY PROBLEM: ICD-10-CM

## 2021-07-14 DIAGNOSIS — R41.89 COGNITIVE DECLINE: ICD-10-CM

## 2021-07-14 DIAGNOSIS — G47.30 SLEEP APNEA, UNSPECIFIED TYPE: ICD-10-CM

## 2021-07-14 DIAGNOSIS — Z12.12 SCREENING FOR COLORECTAL CANCER: ICD-10-CM

## 2021-07-14 PROCEDURE — 99211 OFF/OP EST MAY X REQ PHY/QHP: CPT | Performed by: STUDENT IN AN ORGANIZED HEALTH CARE EDUCATION/TRAINING PROGRAM

## 2021-07-14 PROCEDURE — 99214 OFFICE O/P EST MOD 30 MIN: CPT | Performed by: STUDENT IN AN ORGANIZED HEALTH CARE EDUCATION/TRAINING PROGRAM

## 2021-07-14 ASSESSMENT — FIBROSIS 4 INDEX: FIB4 SCORE: 1.22

## 2021-07-14 ASSESSMENT — PATIENT HEALTH QUESTIONNAIRE - PHQ9: CLINICAL INTERPRETATION OF PHQ2 SCORE: 0

## 2021-07-14 NOTE — PROGRESS NOTES
Neurology Clinic - Follow-up Note    Chief complaint: memory decline          Interval History:  Patient overrall doing well although has noticed mild worsening of short term memory difficulties and remember long rote paragraphs, something he could easily do years ago. Tolerating Namenda. Thinks it has helped. Sleep is better especially with new CPAP machine.     Diet is somewhat better; gained weight in past 3  Months. RTC 3 months.    as chronic stress.. Seen by Dr. Ojeda. Starting Zoloft. Has PET Scan scheduled.        Remained on BiPAP for central sleep, is compliant.    Labs reviewed. Discussed High cholesteral.     Not exercising.    ROS: Pertinent positives and negatives are as documented above          Current medications:     Current Outpatient Medications   Medication Instructions   • ascorbic acid (ASCORBIC ACID) 500 mg, Oral, DAILY   • aspirin (ASA) 81 mg, Oral, EVERY 6 HOURS PRN   • cetirizine (ZYRTEC) 10 mg, Oral, DAILY   • ezetimibe (ZETIA) 10 MG Tab TK 1 T PO QD   • fluticasone (FLONASE) 50 MCG/ACT nasal spray No dose, route, or frequency recorded.   • lamoTRIgine (LAMICTAL) 25 mg, Oral, DAILY   • lisinopril (PRINIVIL) 40 mg, Oral, DAILY   • magnesium citrate Solution 300 mL, Oral, ONCE   • memantine (NAMENDA) 10 mg, Oral, 2 TIMES DAILY   • niacin 1,000 mg, Oral, DAILY   • omeprazole (PRILOSEC) 20 mg, Oral, DAILY   • Potassium (POTASSIMIN PO) Oral, Takes daily    • therapeutic multivitamin-minerals (THERAGRAN-M) Tab 1 Tab, Oral, DAILY   • vitamin D (CHOLECALCIFEROL) 4,000 Units, Oral, DAILY           Physical examination:   Vitals:    07/14/21 1100   BP: 118/70   Pulse: 68   Temp: 36.5 °C (97.7 °F)   SpO2: 94%     Vitals:    07/14/21 1100   BP: 118/70   Pulse: 68   Temp: 36.5 °C (97.7 °F)   SpO2: 94%       General: Patient in no acute distress, pleasant and cooperative.  HEENT: Normocephalic, no signs of acute trauma.   Neck: visibly supple, with  normal range of motion.   Chest: clear to  auscultation. No cough.   CV: RRR, no murmurs.   Skin: no signs of acute rashes or trauma.   Musculoskeletal: Limited range of motion at the ankles  Psychiatric:  Denies symptoms of depression or suicidal ideation. Mood and affect appear normal on exam.      NEUROLOGICAL EXAM:   Mental status, orientation: Awake, alert and fully oriented.   Speech and language: speech is clear and fluent.  Patient is able to articulate her past medical history well and comprehension is grossly intact  Cranial nerve exam: Pupils are 3-4 mm bilaterally. Visual fields are intact by confrontation. There is No nystagmus. Intact full EOM in all directions of gaze. Face appears symmetric.      Motor exam: Strength grossly intact  Sensory exam reveals normal sense of light touch,  Coordination: shows a normal finger-nose-finger.   Gait: Deferred        ANCILLARY DATA REVIEWED:      Lab Data Review:    ASSESSMENT AND PLAN:  Patient with mild cognitive impairment, amnestic type. No clear signs of dementia alghough he may be at the premorbid stages. Stress likely playing a role. He is following up with Dr. Kimball, starting zoloft, getting a Brin PET. C/w Namenda as this has been helpful.  Discussed importance of dealing with high cholesterol, dieting, and getting back into exercising. Repeat MOCA next visit.    Orders Placed This Encounter   • REFERRAL TO GI FOR COLONOSCOPY   • sertraline (ZOLOFT) 50 MG Tab     Encounter Diagnoses   Name Primary?   • Screening for colorectal cancer    • Cognitive decline    • Memory problem    • MCI (mild cognitive impairment) with memory loss    • Obesity (BMI 30-39.9)    • Sleep apnea, unspecified type    • Obstructive sleep apnea syndrome    • Hypertension, unspecified type    • Pre-diabetes             Lexx Clarke MD  Epilepsy and General Neurology  Department of Neurology  Instructor of Neurology [facility].   Office: [phone]  Fax: [phone]      BILLING  DOCUMENTATION:       Counseling:  I spent a total of 42 minutes of face-to-face time in this visit. Over 50% of the time of the visit today was spent on counseling and or coordination of care wtih the patient and/or family, as above in assessment in plan.

## 2021-07-15 ENCOUNTER — OFFICE VISIT (OUTPATIENT)
Dept: SLEEP MEDICINE | Facility: MEDICAL CENTER | Age: 66
End: 2021-07-15
Payer: COMMERCIAL

## 2021-07-15 VITALS
HEART RATE: 63 BPM | HEIGHT: 69 IN | OXYGEN SATURATION: 96 % | WEIGHT: 263 LBS | DIASTOLIC BLOOD PRESSURE: 70 MMHG | BODY MASS INDEX: 38.95 KG/M2 | SYSTOLIC BLOOD PRESSURE: 124 MMHG

## 2021-07-15 DIAGNOSIS — I10 HYPERTENSION, UNSPECIFIED TYPE: ICD-10-CM

## 2021-07-15 DIAGNOSIS — G47.33 OSA (OBSTRUCTIVE SLEEP APNEA): ICD-10-CM

## 2021-07-15 PROCEDURE — 99213 OFFICE O/P EST LOW 20 MIN: CPT | Performed by: NURSE PRACTITIONER

## 2021-07-15 ASSESSMENT — FIBROSIS 4 INDEX: FIB4 SCORE: 1.22

## 2021-07-15 NOTE — PROGRESS NOTES
Chief Complaint   Patient presents with   • Follow-Up     NARCISO- Last seen 10/01/2020       HPI:      Mr. Moody is a 64 y/o male patient who is in today for NARCISO f/u. PMH includes shortness of breath, hypertension, memory problem, obesity, hyperlipidemia, never smoker, tonsillectomy.    Patient was set up with a BiPAP [employer] machine in August 2020.  He denies any issues with the machine nor symptoms however he has noted some coughing and upper airway irritation which possibly could be related to his allergies.  He does not feel that the symptoms are any different from what he typically experiences with allergy season and he continues to get allergy shots and uses Flonase.  Compliance report from 6/15/21-7/14/21 was downloaded and reviewed with the patient which showed BiPAP IPAP/EPAP 8/4 cmH2O, 100% compliance, 7 hrs 1 min use, AHI of 5.4. He is tolerating the pressure and mask well. He goes to bed between 12-1 am and wakes up at 8 am.  He sleeps well through the night. He denies morning headache or snoring. He checks his blood pressure at home routinely and also follows up with his PCP for management.  He takes lisinopril as directed.    Patient reports that he does intermittent fasting to help with weight loss which can range from 2 days to 3 weeks.  He denies any new health problems or medications.    Sleep Study History:   PSG BiPAP titration from 11/26/19 indicated BiPAP was started at 10/6 cm of water and titrated to 16/12 cm of water.  On BIPAP: 14/10 cm of water the resultant AHI was 5 in NREM sleep, however mean oximetry was 89%. On BiPAP: 16/12 cm of water mean oximetry improved to 91% SPO2 in REM sleep    HST on 2/20/19 by an outlying facility which showed severe NARCISO with AHI of 33.5/hr and O2 caryn 62%.     ROS:    Constitutional: Denies fevers, Denies weight changes  Eyes: Denies changes in vision, no eye pain  Ears/Nose/Throat/Mouth: Denies nasal congestion or sore throat   Cardiovascular:  Denies chest pain or palpitations   Respiratory: Denies shortness of breath , Denies cough  Gastrointestinal/Hepatic: Denies abdominal pain, nausea, vomiting,   Skin/Breast: Denies rash,   Neurological: Denies headache, confusion,   Psychiatric: denies mood disorder   Sleep: denies snoring       Past Medical History:   Diagnosis Date   • Allergic rhinitis    • Anesthesia     slow to wake up; ?apneic, needs encourangement to breathe   • Back pain    • Chickenpox    • Chadian measles    • High cholesterol    • Hyperlipidemia    • Hypertension    • Influenza    • Mumps    • Nasal drainage    • Obesity    • Pulmonary hypertension (HCC)    • Sleep apnea 2015    unable to use bipap right now due to sinus issue   • Tonsillitis        Past Surgical History:   Procedure Laterality Date   • UMBILICAL HERNIA REPAIR  2/23/2015    Performed by John H Ganser, M.D. at [facility]   • OTHER ORTHOPEDIC SURGERY  2009    Right Knee Repair   • OTHER ORTHOPEDIC SURGERY  1988    Right Meniscus   • [id number]     • NASAL RECONSTRUCTION     • NEPHRECTOMY LAPAROSCOPIC     • TONSILLECTOMY         Family History   Problem Relation Age of Onset   • Heart Attack Father    • Heart Disease Father    • Sleep Apnea Father        Social History     Socioeconomic History   • Marital status:      Spouse name: Not on file   • Number of children: Not on file   • Years of education: Not on file   • Highest education level: Not on file   Occupational History   • Not on file   Tobacco Use   • Smoking status: Never Smoker   • Smokeless tobacco: Never Used   Vaping Use   • Vaping Use: Never used   Substance and Sexual Activity   • Alcohol use: Yes     Comment: 0-1 glasses of wine a week   • Drug use: No   • Sexual activity: Not on file   Other Topics Concern   • Not on file   Social History Narrative   • Not on file     Social Determinants of Health     Financial Resource Strain:    • Difficulty of Paying Living Expenses:    Food Insecurity:     • Worried About Running Out of Food in the Last Year:    • Ran Out of Food in the Last Year:    Transportation Needs:    • Lack of Transportation (Medical):    • Lack of Transportation (Non-Medical):    Physical Activity:    • Days of Exercise per Week:    • Minutes of Exercise per Session:    Stress:    • Feeling of Stress :    Social Connections:    • Frequency of Communication with Friends and Family:    • Frequency of Social Gatherings with Friends and Family:    • Attends [employer] Services:    • Active Member of Clubs or Organizations:    • Attends Club or Organization Meetings:    • Marital Status:    Intimate Partner Violence:    • Fear of Current or Ex-Partner:    • Emotionally Abused:    • Physically Abused:    • Sexually Abused:        Allergies as of 07/15/2021 - Reviewed 07/15/2021   Allergen Reaction Noted   • Crestor [rosuvastatin calcium]  12/28/2012        Vitals:  Vitals:    07/15/21 0957   BP: 124/70   Pulse: 63   SpO2: 96%       Current medications as of today   Current Outpatient Medications   Medication Sig Dispense Refill   • sertraline (ZOLOFT) 50 MG Tab 50 mg.     • Memantine HCl ER (NAMENDA XR) 28 MG CAPSULE SR 24 HR Take 1 capsule by mouth every day. 30 capsule 5   • magnesium citrate Solution Take 300 mL by mouth one time.     • Potassium (POTASSIMIN PO) Take  by mouth. Takes daily     • lamoTRIgine (LAMICTAL) 25 MG Tab Take 1 Tab by mouth every day. 90 Tab 5   • fluticasone (FLONASE) 50 MCG/ACT nasal spray      • cetirizine (ZYRTEC) 10 MG Tab Take 10 mg by mouth every day.     • therapeutic multivitamin-minerals (THERAGRAN-M) Tab Take 1 Tab by mouth every day.     • aspirin (ASA) 325 MG Tab Take 81 mg by mouth every 6 hours as needed.     • ascorbic acid (ASCORBIC ACID) 500 MG Tab Take 500 mg by mouth every day.     • ezetimibe (ZETIA) 10 MG Tab TK 1 T PO QD  5   • omeprazole (PRILOSEC) 20 MG delayed-release capsule Take 20 mg by mouth every day.     • vitamin D (CHOLECALCIFEROL) 1000  UNIT Tab Take 4,000 Units by mouth every day.     • lisinopril (PRINIVIL) 20 MG Tab Take 40 mg by mouth every day.     • niacin 500 MG Tab Take 1,000 mg by mouth every day. (Patient not taking: Reported on 7/14/2021)       No current facility-administered medications for this visit.         Physical Exam: Limited by COVID-19 precautions.  Appearance: Well developed, well nourished, no acute distress  Eyes: PERRL, EOM intact, sclera white, conjunctiva moist  Ears: no lesions or deformities  Hearing: grossly intact  Nose: no lesions or deformities  Respiratory effort: no intercostal retractions or use of accessory muscles  Extremities: no cyanosis or edema  Abdomen: soft  Gait and Station: normal  Digits and nails: no clubbing, cyanosis, petechiae or nodes.  Cranial nerves: grossly intact  Skin: no visible rashes, lesions or ulcers noted  Orientation: Oriented to time, person and place  Mood and affect: mood and affect appropriate, normal interaction with examiner  Judgement: Intact    Assessment:  1. NARCISO (obstructive sleep apnea)  DME BiPAP   2. Hypertension, unspecified type     3. BMI 38.0-38.9,adult           Plan  Discussed the cardiovascular and neuropsychiatric risks of untreated NARCISO; including but not limited to: HTN, DM, MI, ASCVD, CVA, CHF, traffic accidents.     1. Compliance report from 6/15/21-7/14/21 was downloaded and reviewed with the patient which showed BiPAP IPAP/EPAP 8/4 cmH2O, 100% compliance, 7 hrs 1 min use, AHI of 5.4. Patient is compliant and benefiting from BiPAP therapy for management of NARCISO.     *DME order ([facility]) for new [employer] BiPAP IPAP/EPAP 8/4 cmH2O, mask (FFM with comfort gel or MOC) and supplies was provided today. Continue to clean mask and supplies weekly with soap and water, and change supplies per insurance guidelines.     *DME order for inline filter was placed today.     * Recall of [employer] BiPAP machines was reviewed with the patient today.   We were just recently  told about these recalls. What I have been recommending to the patients is to call their supplying companies to figure out exact model of their machine. You can self  register your machine on [employer] website as mentioned in the letter that will be sent out by [facility], or ask your DME to assist you in the process.  We do not know if [employer] will be giving out replacement machines or just repairing it. This is all new for us to let you know how this is going to pan out. The recent recall from [employer] is due to disintegration of the polyurethane foam. Currently I recommend to continue using your machine until or unless you experience black debris/particles within the air path or experiencing headaches, upper airway irritation, cough, chest pressure, sinus infection, irritation to skin/eyes/respiratory tract, inflammatory response, asthma, nausea/vomiting to stop using the PAP therapy immediately and contact the office. It is ultimately your decision on whether you choose to continue using the BiPAP machine. Patient was also recommended to reach out to their DME to let them know that they are using [employer]ics machine, therefore when and if needed those machines can be replaced by their DME's. We will keep you posted as we get more information from the  for the DME.     [place] your device on the recall website at [email or web address]     *Patient was advised to follow-up sooner should he obtain a new machine prior to next f/u OV, and to have at least 30 days of compliance data for review.    2. Continue to f/u with PCP for BP management.  3.  Continue to stay active.  Patient was advised to take caution with fasting and to further review with PCP or a nutritionist.  4. Sleep hygiene discussed. Recommend keeping a set sleep/wake schedule. Logging enough hours of sleep. Limiting/Avoiding naps. No caffeine after noon and no heavy meals in the evening.   5. Follow up with the appropriate  healthcare practitioners for all other medical problems.  6. F/u in 6 months for NARCISO, sooner if needed.       MACK Lloyd.      This dictation was created using voice recognition software. The accuracy of the dictation is limited to the abilities of the software. I expect there may be some errors of grammar and possibly content.

## 2021-08-25 DIAGNOSIS — G31.84 MCI (MILD COGNITIVE IMPAIRMENT) WITH MEMORY LOSS: ICD-10-CM

## 2021-08-25 DIAGNOSIS — R41.89 COGNITIVE DECLINE: ICD-10-CM

## 2021-08-25 DIAGNOSIS — R41.3 MEMORY PROBLEM: ICD-10-CM

## 2021-08-25 RX ORDER — MEMANTINE HYDROCHLORIDE 28 MG/1
28 CAPSULE, EXTENDED RELEASE ORAL DAILY
Qty: 30 CAPSULE | Refills: 5 | Status: SHIPPED | OUTPATIENT
Start: 2021-08-25 | End: 2023-05-22

## 2021-09-24 DIAGNOSIS — R68.89 SPELLS OF DECREASED ATTENTIVENESS: ICD-10-CM

## 2021-09-27 RX ORDER — LAMOTRIGINE 25 MG/1
TABLET ORAL
Qty: 90 TABLET | Refills: 1 | Status: SHIPPED | OUTPATIENT
Start: 2021-09-27 | End: 2022-02-03

## 2021-10-15 ENCOUNTER — APPOINTMENT (OUTPATIENT)
Dept: NEUROLOGY | Facility: MEDICAL CENTER | Age: 66
End: 2021-10-15
Attending: STUDENT IN AN ORGANIZED HEALTH CARE EDUCATION/TRAINING PROGRAM
Payer: COMMERCIAL

## 2021-12-16 ENCOUNTER — OFFICE VISIT (OUTPATIENT)
Dept: NEUROLOGY | Facility: MEDICAL CENTER | Age: 66
End: 2021-12-16
Attending: STUDENT IN AN ORGANIZED HEALTH CARE EDUCATION/TRAINING PROGRAM
Payer: MEDICARE

## 2021-12-16 VITALS
HEART RATE: 78 BPM | BODY MASS INDEX: 40.64 KG/M2 | SYSTOLIC BLOOD PRESSURE: 112 MMHG | WEIGHT: 274.4 LBS | HEIGHT: 69 IN | DIASTOLIC BLOOD PRESSURE: 72 MMHG | TEMPERATURE: 97.7 F | OXYGEN SATURATION: 98 %

## 2021-12-16 DIAGNOSIS — R41.3 MEMORY PROBLEM: ICD-10-CM

## 2021-12-16 DIAGNOSIS — R41.89 COGNITIVE DECLINE: ICD-10-CM

## 2021-12-16 DIAGNOSIS — G47.30 SLEEP APNEA, UNSPECIFIED TYPE: ICD-10-CM

## 2021-12-16 DIAGNOSIS — F43.9 STRESS: ICD-10-CM

## 2021-12-16 DIAGNOSIS — G31.84 MCI (MILD COGNITIVE IMPAIRMENT) WITH MEMORY LOSS: ICD-10-CM

## 2021-12-16 DIAGNOSIS — G47.33 OBSTRUCTIVE SLEEP APNEA SYNDROME: ICD-10-CM

## 2021-12-16 PROCEDURE — 99215 OFFICE O/P EST HI 40 MIN: CPT | Performed by: STUDENT IN AN ORGANIZED HEALTH CARE EDUCATION/TRAINING PROGRAM

## 2021-12-16 RX ORDER — DONEPEZIL HYDROCHLORIDE 5 MG/1
5 TABLET, FILM COATED ORAL EVERY EVENING
Qty: 30 TABLET | Refills: 5 | Status: SHIPPED | OUTPATIENT
Start: 2021-12-16 | End: 2021-12-20

## 2021-12-16 ASSESSMENT — FIBROSIS 4 INDEX: FIB4 SCORE: 1.22

## 2021-12-16 ASSESSMENT — VISUAL ACUITY: VA_NORMAL: 1

## 2021-12-16 NOTE — PATIENT INSTRUCTIONS
Neurology Clinic Visit     IMPORTANT: If imaging, procedure(s), AND/or referrals were placed for you:  Contact [facility] Scheduling if you have not heard from them in 5 day: [phone]    Outpatient [facility] Imaging Sites.  • [address]  Mon-Fri 8am-5pm   • [address] (Breast Center) Mon-Fri 7am-4pm    • [address]  Mon-Fri 8am-5pm  • [facility] Radiology 7am-6:30pm  • [address] Mon-Fri 8am-7pm  Sat 9am-6pm   • [address]  Mon-Fri 8am-7pm and Sat/Sun 9am-5pm  • [address]  Mon-Fri 8am-5pm  • [address] (PET/CT)  Mon-Fri 8:30am-4:30pm     If labs were ordered for you:  • To Schedule an appointment for lab services, please call [phone] or visit [email or web address].  • [facility] Lab Services Convenient Locations:    [place]: • [address] (Ground Floor)  • [address] (Admitting Entrance)  • [address], [address]  • [address]  • [address] [facility], [address]  • [address] [facility] [address]  • [address]: • [address]  • [address]       [place]: • [address]  • [address]     [facility] Advanced Medicine     [address] [address]     Laboratory Hours: 6:30am - 6pm  Monday - Friday,   7am - 2pm Saturday    [facility] Medical [facility]      [address] [address]      Laboratory Hours:  7:30am - 4pm  Monday - Friday,  9am - 3pm Saturday    [facility]     [address]    [address] [address]      Laboratory Hours:  7:00am - 5:30pm  Monday - Friday    [facility]      [address] [address]       Laboratory Hours:  7:30am - 4:30pm  Monday - Friday    [facility]       [address] [place] [address]       Laboratory Hours:  8am - 5pm  Monday - Friday    [facility]       [address] [place] [address]        Laboratory hours:  7:30am - 4pm  Monday - Friday    GENERAL REMINDERS:  · Request refills 1 week in advance to ensure you do not run out of medications  · All diagnostic study results will be reviewed at your next visit, UNLESS there are urgent results that need to be acted on sooner.  · Please remember that it is the your responsibility to check with your Insurance for benefit coverage for visit / visits.  · 24 hours notice is required for all appointment changes or cancellation.  · Please arrive 20 min. before your appointment time  · Please note that because Dr. Clarke has high daily clinic volume, he is unable to accommodate late arrivals. If you are more than 15 minutes late for the scheduled appointment you will be asked to reschedule  · Please bring the following with you:  1) Picture ID  2) Insurance card  3) An updated list of ALL your medications and their dosages (prescribed medications, over the counter medications, and all supplements), as well as allergies with you at all times  4) A list of questions, concerns, comments that you wish to discuss with Dr. Tricia Clarke MD  General Neurologist and Epileptologist  Department of Neurology  Instructor of Clinical Neurology [facility].

## 2021-12-16 NOTE — PROGRESS NOTES
"NEUROLOGY CLINIC FOLLOW-UP - 12/16/2021   REFERRING PROVIDER  No referring provider defined for this encounter.    REASON FOR VISIT: Yadiel Moody 65 y.o. male presents today for follow-up for MCI, amnestic type, stress, NARCISO    INTERVAL HISTORY:  Overall doing well.     Continues namenda 28 XR 28 mg per day and has noticed benefit from     On zoloft and is doing well on it.     MRI brain done on 12/7/21. Reviewed MRI personally. Done at [facility]. Does have very mild microvasculare ischemic disease. Mild to moderate atrophy, slightly more prominent in bitemporal lobe.    Patient's PMH, PSH, FH, and SH were reviewed.    ROS: All review of systems complete and are negative except as documented  CURRENT MEDICATIONS AT THE TIME OF THIS ENCOUNTER:    Current Outpatient Medications:   •  donepezil, 5 mg, Oral, Q EVENING  •  lamoTRIgine, TAKE 1 TABLET BY MOUTH EVERY DAY, Taking  •  Memantine HCl ER, 28 mg, Oral, DAILY, Taking  •  sertraline, 100 mg., Taking  •  magnesium citrate, 300 mL, Oral, Once, Taking  •  Potassium (POTASSIMIN PO), Take  by mouth. Takes daily, Taking  •  fluticasone, , PRN  •  therapeutic multivitamin-minerals, 1 Tablet, Oral, DAILY, Taking  •  aspirin, 81 mg, Oral, Q6HRS PRN, Taking  •  ascorbic acid, 500 mg, Oral, DAILY, Taking  •  ezetimibe, TK 1 T PO QD, Taking  •  omeprazole, 20 mg, Oral, DAILY, Taking  •  vitamin D, 4,000 Units, Oral, DAILY, Taking  •  lisinopril, 40 mg, Oral, DAILY, Taking     EXAM:   Ambulatory Vitals:  /72   Pulse 78   Temp 36.5 °C (97.7 °F)   Ht 1.753 m (5' 9\")   Wt 124 kg (274 lb 6.4 oz)   SpO2 98%    Physical Exam:  Physical Exam  Vitals reviewed.   Constitutional:       General: He is not in acute distress.     Appearance: Normal appearance. He is normal weight. He is not ill-appearing.   HENT:      Head: Normocephalic and atraumatic.      Nose: Nose normal.      Mouth/Throat:      Mouth: Mucous membranes are dry.   Eyes:      Extraocular Movements: " EOM normal. No nystagmus.      Pupils: Pupils are equal, round, and reactive to light.   Cardiovascular:      Rate and Rhythm: Normal rate and regular rhythm.      Pulses: Normal pulses.      Heart sounds: Normal heart sounds.   Pulmonary:      Effort: Pulmonary effort is normal.      Breath sounds: Normal breath sounds.   Musculoskeletal:         General: No swelling, tenderness or deformity.      Cervical back: Normal range of motion.   Skin:     General: Skin is warm and dry.      Coloration: Skin is not jaundiced.      Findings: No bruising or lesion.   Neurological:      Mental Status: He is alert.      Coordination: Coordination is intact.      Deep Tendon Reflexes:      Reflex Scores:       Tricep reflexes are 2+ on the right side and 2+ on the left side.       Bicep reflexes are 2+ on the right side and 2+ on the left side.       Brachioradialis reflexes are 2+ on the right side and 2+ on the left side.       Patellar reflexes are 2+ on the right side and 2+ on the left side.       Achilles reflexes are 2+ on the right side and 2+ on the left side.  Psychiatric:         Mood and Affect: Mood normal.         Speech: Speech normal.         Behavior: Behavior normal.         Thought Content: Thought content normal.         Judgment: Judgment normal.        Neurological Exam   Neurological Exam  Mental Status  Alert. Recent and remote memory are intact. Speech is normal. Language is fluent with no aphasia. Attention and concentration are normal. Fund of knowledge is appropriate for level of education.    Cranial Nerves  CN II: Visual acuity is normal. Visual fields full to confrontation. Right funduscopic exam: not visualized. Left funduscopic exam: not visualized.  CN III, IV, VI: Extraocular movements intact bilaterally. No nystagmus. Normal saccades. Diminished smooth pursuit. Pupils equal round and reactive to light bilaterally.   Right pupil: 3 mm. Round. Reactive to light. Reactive to accommodation.   Left  pupil: 3 mm. Round. Reactive to light. Reactive to accommodation.  Relative afferent pupillary defect absent.  CN V: Facial sensation is normal.  CN VII: Full and symmetric facial movement.  CN VIII: Hearing is normal.  CN IX, X: Palate elevates symmetrically  CN XI: Shoulder shrug strength is normal.  CN XII: Tongue midline without atrophy or fasciculations.    Motor  Normal muscle bulk throughout. No fasciculations present. Normal muscle tone. No abnormal involuntary movements. Strength is 5/5 in all four extremities except as noted.    Sensory  Light touch is normal in upper and lower extremities.  No right-sided hemispatial neglect. No left-sided hemispatial neglect.    Reflexes                                           Right                      Left  Brachioradialis                    2+                         2+  Biceps                                 2+                         2+  Triceps                                2+                         2+  Patellar                                2+                         2+  Achilles                                2+                         2+  Plantar                           Mute                Mute    Right pathological reflexes: Ankle clonus absent.  Left pathological reflexes: Ankle clonus absent.    Coordination  Finger-to-nose, rapid alternating movements and heel-to-shin normal bilaterally without dysmetria.    Gait  Casual gait is normal including stance, stride, and arm swing. Able to rise from chair without using arms.       ALL DATA (I.e. labs, procedures, imaging, reports, clinical notes, etc.) FROM [facility] AND/OR OUTSIDE SOURCES, IF AVAILABLE, PERSONALLY REVIEWED:   LABS:    MAGING-    PROCEDURE    OUTSIDE DATA    ASSESSMENT, EDUCATION, AND COUNSELING:  This is a 65 y.o. male patient who presents to the neurology clinic. We had an extensive discussion about the patient's symptoms, signs, and work-up to date, if any. We discussed potential and/or  definitive diagnoses, work-up, and potential treatments.       PLAN:  If applicable, the work-up such as labs, imaging, procedures, and/or other testing, referrals, and/or recommended treatment strategies are listed below.  Visit Diagnoses     ICD-10-CM   1. MCI (mild cognitive impairment) with memory loss  G31.84   2. Cognitive decline  R41.89   3. Memory problem  R41.3   4. Obstructive sleep apnea syndrome  G47.33   5. Sleep apnea, unspecified type  G47.30   6. Stress  F43.9      Orders Placed This Encounter   • donepezil (ARICEPT) 5 MG Tab      Patient with MCI amnestic type. On my review of the MRI, he does have mild disproportionate atrophy of bitemporal lobes correlating with his symptoms. Overall, however, he is doing well. Adding low dose donepezil to the regimen. F/u in 3 mnths at which point I will repeat MOCa    Follow-up:   • 3 month    Items/Topics to follow-up on in future visits:  • Next visit repeat MOCA    [employer] ADDRESSED, IF APPLICABLE:  Colon cancer screening due. Colonoscopy ordered in July, 2021. Study is pending.    BILLING DOCUMENTATION:     I spent a total of 40 minutes of face-to-face time in this visit. Over 50% of the time of the visit today was spent on counseling and/or coordination of care wtih the patient and/or family, as above in assessment in plan.    Lexx Clarke MD  Epilepsy and General Neurology  Department of Neurology  Clinical  of Neurology [facility].

## 2021-12-17 NOTE — TELEPHONE ENCOUNTER
Received request via: Pharmacy    Was the patient seen in the last year in this department? Yes    Does the patient have an active prescription (recently filled or refills available) for medication(s) requested? Yes     It looks like you sent a request for this refill yesterday. I am only resending the request because you sent an order for a 30 day supply and this note requests a 90 day supply

## 2021-12-20 RX ORDER — DONEPEZIL HYDROCHLORIDE 5 MG/1
5 TABLET, FILM COATED ORAL EVERY EVENING
Qty: 90 TABLET | Refills: 3 | Status: SHIPPED | OUTPATIENT
Start: 2021-12-20 | End: 2022-03-28

## 2022-01-20 ENCOUNTER — OFFICE VISIT (OUTPATIENT)
Dept: SLEEP MEDICINE | Facility: MEDICAL CENTER | Age: 67
End: 2022-01-20
Payer: MEDICARE

## 2022-01-20 VITALS
WEIGHT: 270 LBS | HEIGHT: 69 IN | OXYGEN SATURATION: 95 % | HEART RATE: 70 BPM | DIASTOLIC BLOOD PRESSURE: 72 MMHG | SYSTOLIC BLOOD PRESSURE: 130 MMHG | BODY MASS INDEX: 39.99 KG/M2

## 2022-01-20 DIAGNOSIS — Z23 ENCOUNTER FOR IMMUNIZATION: ICD-10-CM

## 2022-01-20 DIAGNOSIS — F33.1 MODERATE EPISODE OF RECURRENT MAJOR DEPRESSIVE DISORDER (HCC): ICD-10-CM

## 2022-01-20 DIAGNOSIS — R41.3 MEMORY PROBLEM: ICD-10-CM

## 2022-01-20 DIAGNOSIS — G47.33 OSA (OBSTRUCTIVE SLEEP APNEA): ICD-10-CM

## 2022-01-20 DIAGNOSIS — I10 HYPERTENSION, UNSPECIFIED TYPE: ICD-10-CM

## 2022-01-20 PROBLEM — F32.9 MAJOR DEPRESSIVE DISORDER: Status: ACTIVE | Noted: 2022-01-20

## 2022-01-20 PROCEDURE — 90732 PPSV23 VACC 2 YRS+ SUBQ/IM: CPT | Performed by: NURSE PRACTITIONER

## 2022-01-20 PROCEDURE — 99213 OFFICE O/P EST LOW 20 MIN: CPT | Mod: 25 | Performed by: NURSE PRACTITIONER

## 2022-01-20 PROCEDURE — G0009 ADMIN PNEUMOCOCCAL VACCINE: HCPCS | Performed by: NURSE PRACTITIONER

## 2022-01-20 RX ORDER — CETIRIZINE HYDROCHLORIDE 10 MG/1
TABLET ORAL PRN
COMMUNITY
Start: 2021-12-28 | End: 2023-05-22

## 2022-01-20 RX ORDER — SERTRALINE HYDROCHLORIDE 100 MG/1
1 TABLET, FILM COATED ORAL 2 TIMES DAILY
COMMUNITY
Start: 2022-01-06 | End: 2023-05-22

## 2022-01-20 RX ORDER — GALANTAMINE HYDROBROMIDE 16 MG/1
CAPSULE, EXTENDED RELEASE ORAL
COMMUNITY
Start: 2022-01-18 | End: 2022-03-30

## 2022-01-20 ASSESSMENT — PATIENT HEALTH QUESTIONNAIRE - PHQ9
CLINICAL INTERPRETATION OF PHQ2 SCORE: 6
SUM OF ALL RESPONSES TO PHQ QUESTIONS 1-9: 18
5. POOR APPETITE OR OVEREATING: 3 - NEARLY EVERY DAY

## 2022-01-20 ASSESSMENT — FIBROSIS 4 INDEX: FIB4 SCORE: 1.24

## 2022-01-20 NOTE — PROGRESS NOTES
Chief Complaint   Patient presents with   • Follow-Up     NARCISO- Last seen 07/15/2021       HPI:      Mr. Moody is a 67 y/o male patient who is in today for NARCISO f/u. PMH includes shortness of breath, hypertension, memory problem, obesity, hyperlipidemia, never smoker, tonsillectomy.    Patient was set up with a BiPAP [employer] machine in August 2020.  Patient has registered the device and a prior order for a new BiPAP has been denied by insurance.  Compliance report from 12/21/21-1/19/22 was downloaded and reviewed with the patient which showed BiPAP IPAP/EPAP 8/4 cmH2O, 96.7% compliance, 7 hrs 27 min use, AHI of 4.5. He is tolerating the pressure and mask well. He goes to bed between 12-1 am and wakes up at 8 am.  He sleeps well through the night. He however now since being on galantamine he does wake up at least 3-4 times a night due to nocturia.  Denies morning headache or snoring.  He continues to follow-up with PCP for blood pressure management.  He tries to stay active by walking and do intermittent fasting.  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  He continues to also follow-up with psychiatry Dr. Cates for moderate depression which is currently managed with galantamine, lamotrigine, and sertraline.    Sleep Study History:   PSG BiPAP titration from 11/26/19 indicated BiPAP was started at 10/6 cm of water and titrated to 16/12 cm of water.  On BIPAP: 14/10 cm of water the resultant AHI was 5 in NREM sleep, however mean oximetry was 89%. On BiPAP: 16/12 cm of water mean oximetry improved to 91% SPO2 in REM sleep     HST on 2/20/19 by an outlying facility which showed severe NARCISO with AHI of 33.5/hr and O2 caryn 62%.     ROS:    Constitutional: Denies fevers, Denies weight changes  Eyes: Denies changes in vision, no eye pain  Ears/Nose/Throat/Mouth: Denies nasal  congestion or sore throat   Cardiovascular: Denies chest pain or palpitations   Respiratory: Denies shortness of breath , Denies cough  Gastrointestinal/Hepatic: Denies abdominal pain, nausea, vomiting,   Skin/Breast: Denies rash,   Neurological: Denies headache, confusion,   Psychiatric: denies mood disorder   Sleep: denies snoring       Past Medical History:   Diagnosis Date   • Allergic rhinitis    • Anesthesia     slow to wake up; ?apneic, needs encourangement to breathe   • Back pain    • Chickenpox    • [place] measles    • High cholesterol    • Hyperlipidemia    • Hypertension    • Influenza    • Mumps    • Nasal drainage    • Obesity    • Pulmonary hypertension (HCC)    • Sleep apnea 2015    unable to use bipap right now due to sinus issue   • Tonsillitis        Past Surgical History:   Procedure Laterality Date   • UMBILICAL HERNIA REPAIR  2/23/2015    Performed by John H Ganser, M.D. at [facility]   • OTHER ORTHOPEDIC SURGERY  2009    Right Knee Repair   • OTHER ORTHOPEDIC SURGERY  1988    Right Meniscus   • [id number]     • NASAL RECONSTRUCTION     • NEPHRECTOMY LAPAROSCOPIC     • TONSILLECTOMY         Family History   Problem Relation Age of Onset   • Heart Attack Father    • Heart Disease Father    • Sleep Apnea Father        Social History     Socioeconomic History   • Marital status:      Spouse name: Not on file   • Number of children: Not on file   • Years of education: Not on file   • Highest education level: Not on file   Occupational History   • Not on file   Tobacco Use   • Smoking status: Never Smoker   • Smokeless tobacco: Never Used   Vaping Use   • Vaping Use: Never used   Substance and Sexual Activity   • Alcohol use: Yes     Comment: 0-1 glasses of wine a week   • Drug use: No   • Sexual activity: Not on file   Other Topics Concern   • Not on file   Social History Narrative   • Not on file     Social Determinants of Health     Financial Resource Strain:    • Difficulty of  Paying Living Expenses: Not on file   Food Insecurity:    • Worried About Running Out of Food in the Last Year: Not on file   • Ran Out of Food in the Last Year: Not on file   Transportation Needs:    • Lack of Transportation (Medical): Not on file   • Lack of Transportation (Non-Medical): Not on file   Physical Activity:    • Days of Exercise per Week: Not on file   • Minutes of Exercise per Session: Not on file   Stress:    • Feeling of Stress : Not on file   Social Connections:    • Frequency of Communication with Friends and Family: Not on file   • Frequency of Social Gatherings with Friends and Family: Not on file   • Attends Congregational Services: Not on file   • Active Member of Clubs or Organizations: Not on file   • Attends Club or Organization Meetings: Not on file   • Marital Status: Not on file   Intimate Partner Violence:    • Fear of Current or Ex-Partner: Not on file   • Emotionally Abused: Not on file   • Physically Abused: Not on file   • Sexually Abused: Not on file   Housing Stability:    • Unable to Pay for Housing in the Last Year: Not on file   • Number of Places Lived in the Last Year: Not on file   • Unstable Housing in the Last Year: Not on file       Allergies as of 01/20/2022 - Reviewed 01/20/2022   Allergen Reaction Noted   • Crestor [rosuvastatin calcium]  12/28/2012        Vitals:  Vitals:    01/20/22 0943   BP: 130/72   Pulse: 70   SpO2: 95%       Current medications as of today   Current Outpatient Medications   Medication Sig Dispense Refill   • galantamine (RAZADYNE ER) 16 MG ER capsule      • donepezil (ARICEPT) 5 MG Tab TAKE 1 TABLET BY MOUTH EVERY EVENING 90 Tablet 3   • lamoTRIgine (LAMICTAL) 25 MG Tab TAKE 1 TABLET BY MOUTH EVERY DAY 90 Tablet 1   • Memantine HCl ER (NAMENDA) 28 MG CAPSULE SR 24 HR TAKE 1 CAPSULE BY MOUTH EVERY DAY 30 Capsule 5   • sertraline (ZOLOFT) 50 MG Tab 100 mg.     • magnesium citrate Solution Take 300 mL by mouth one time.     • Potassium (POTASSIMIN PO)  Take  by mouth. Takes daily     • fluticasone (FLONASE) 50 MCG/ACT nasal spray      • therapeutic multivitamin-minerals (THERAGRAN-M) Tab Take 1 Tab by mouth every day.     • aspirin (ASA) 325 MG Tab Take 81 mg by mouth every 6 hours as needed.     • ascorbic acid (ASCORBIC ACID) 500 MG Tab Take 500 mg by mouth every day.     • ezetimibe (ZETIA) 10 MG Tab TK 1 T PO QD  5   • omeprazole (PRILOSEC) 20 MG delayed-release capsule Take 20 mg by mouth every day.     • vitamin D (CHOLECALCIFEROL) 1000 UNIT Tab Take 4,000 Units by mouth every day.     • lisinopril (PRINIVIL) 20 MG Tab Take 40 mg by mouth every day.       No current facility-administered medications for this visit.         Physical Exam: Limited by COVID-19 precautions.  Appearance: Well developed, well nourished, no acute distress  Eyes: PERRL, EOM intact, sclera white, conjunctiva moist  Ears: no lesions or deformities  Hearing: grossly intact  Nose: no lesions or deformities  Respiratory effort: no intercostal retractions or use of accessory muscles  Extremities: no cyanosis or edema  Abdomen: soft   Gait and Station: normal  Digits and nails: no clubbing, cyanosis, petechiae or nodes.  Cranial nerves: grossly intact  Skin: no visible rashes, lesions or ulcers noted  Orientation: Oriented to time, person and place  Mood and affect: mood and affect appropriate, normal interaction with examiner  Judgement: Intact    Assessment:  1. NARCISO (obstructive sleep apnea)  DME Mask and Supplies   2. Hypertension, unspecified type     3. BMI 39.0-39.9,adult  Patient identified as having weight management issue.  Appropriate orders and counseling given.   4. Memory problem     5. Encounter for immunization  Pneumovax Vaccine (PPSV23)   6. Moderate episode of recurrent major depressive disorder (HCC)  Patient has been identified as having a positive depression screening. Appropriate orders and counseling have been given.         Plan  Discussed the cardiovascular and  neuropsychiatric risks of untreated NARCISO; including but not limited to: HTN, DM, MI, ASCVD, CVA, CHF, traffic accidents.     1. Compliance report from 12/21/21-1/19/22 was downloaded and reviewed with the patient which showed BiPAP IPAP/EPAP 8/4 cmH2O, 96.7% compliance, 7 hrs 27 min use, AHI of 4.5.  Patient is compliant and benefiting from BiPAP therapy for management of NARCISO.     *DME order ([facility]) for mask (FFM or MOC) and supplies was placed today. Continue to clean mask and supplies weekly with soap and water, and change supplies per insurance guidelines.     *Sleep hygiene discussed. Recommend keeping a set sleep/wake schedule. Logging enough hours of sleep. Limiting/Avoiding naps. No caffeine after noon and no heavy meals in the evening.    **Recall of [employer] BiPAP machines was reviewed with the patient today.    What I have been recommending to the patients is to call their supplying companies to figure out exact model of their machine. You can self register your machine on [employer] website listed below. [employer] has already been replacing the machines. The recent recall from [employer] is due to disintegration of the polyurethane foam. This is a 0.03% risk of break down of an inner foam in device, which is a small risk. Currently I recommend to continue using your machine until or unless you experience black debris/particles within the air path or experiencing headaches, upper airway irritation, cough, chest pressure, sinus infection, irritation to skin/eyes/respiratory tract, inflammatory response, asthma, nausea/vomiting to stop using the PAP therapy immediately and contact the office. It is ultimately your decision on whether you choose to continue using the BiPAP machine. Patient is also recommended to reach out to their DME to let them know that they are using [employer] machine, therefore when and if needed those machines can be replaced by their DME's. We will keep you  posted as we get more information from the  for the DME. Advised patient to not use So-Clean or any other type of Ozone .  Patient may also obtain an inline filter through CP[employer] more which is a local company or on [employer].  These filters last for about 3 months but tend to decrease the device pressure.  If this should occur we would need to increase the device pressure.    [place] your device on the recall website at www.Locally/src-update     2. Continue to f/u with PCP for BP management.  3. Encouraged a healthy diet and exercise to help with weight loss and management.   If your BMI is 25-29.9 you are overweight. If your BMI is 30 or greater you are obese. To lose weight eat less, move more, or both. Any diet that reduces caloric intake can help with weight loss. Extra weight may reduce your lifespan. Avoid dramatic unsustainable dietary changes that result in the yo-yo effect (down then back up.)  Usually small modifications in diet and exercise are easier to stick with.  4.  Continue to f/u with [facility].   5. PPSV23 vaccination was administered today.   6.  Pression screening was completed today with a PHQ 2 score of 6 and a PHQ 9 score of 18.  Continue to follow-up with psychiatry Dr. Lucia.  7. Follow up with appropriate healthcare providers for all other medical problems.  8. F/u in 1 year for NARCISO, sooner if needed.       MACK Lloyd.      This dictation was created using voice recognition software. The accuracy of the dictation is limited to the abilities of the software. I expect there may be some errors of grammar and possibly content.  '

## 2022-02-02 DIAGNOSIS — R68.89 SPELLS OF DECREASED ATTENTIVENESS: ICD-10-CM

## 2022-02-03 RX ORDER — LAMOTRIGINE 25 MG/1
TABLET ORAL
Qty: 90 TABLET | Refills: 1 | Status: SHIPPED | OUTPATIENT
Start: 2022-02-03 | End: 2022-07-28

## 2022-03-23 DIAGNOSIS — G31.84 MCI (MILD COGNITIVE IMPAIRMENT) WITH MEMORY LOSS: ICD-10-CM

## 2022-03-28 RX ORDER — DONEPEZIL HYDROCHLORIDE 5 MG/1
5 TABLET, FILM COATED ORAL EVERY EVENING
Qty: 90 TABLET | Refills: 3 | Status: SHIPPED | OUTPATIENT
Start: 2022-03-28 | End: 2022-03-30

## 2022-03-30 ENCOUNTER — OFFICE VISIT (OUTPATIENT)
Dept: NEUROLOGY | Facility: MEDICAL CENTER | Age: 67
End: 2022-03-30
Attending: STUDENT IN AN ORGANIZED HEALTH CARE EDUCATION/TRAINING PROGRAM
Payer: MEDICARE

## 2022-03-30 VITALS
DIASTOLIC BLOOD PRESSURE: 78 MMHG | TEMPERATURE: 98 F | HEIGHT: 69 IN | WEIGHT: 264 LBS | OXYGEN SATURATION: 98 % | SYSTOLIC BLOOD PRESSURE: 128 MMHG | HEART RATE: 88 BPM | BODY MASS INDEX: 39.1 KG/M2

## 2022-03-30 DIAGNOSIS — I10 HYPERTENSION, UNSPECIFIED TYPE: ICD-10-CM

## 2022-03-30 DIAGNOSIS — R41.842 VISUAL-SPATIAL DISORIENTATION: ICD-10-CM

## 2022-03-30 DIAGNOSIS — E66.01 MORBID OBESITY WITH BMI OF 40.0-44.9, ADULT (HCC): ICD-10-CM

## 2022-03-30 DIAGNOSIS — R73.03 PRE-DIABETES: ICD-10-CM

## 2022-03-30 DIAGNOSIS — M79.2 NERVE PAIN: ICD-10-CM

## 2022-03-30 DIAGNOSIS — G47.33 OSA (OBSTRUCTIVE SLEEP APNEA): ICD-10-CM

## 2022-03-30 DIAGNOSIS — F43.9 STRESS: ICD-10-CM

## 2022-03-30 DIAGNOSIS — R68.89 SPELLS OF DECREASED ATTENTIVENESS: ICD-10-CM

## 2022-03-30 DIAGNOSIS — G47.33 OBSTRUCTIVE SLEEP APNEA SYNDROME: ICD-10-CM

## 2022-03-30 DIAGNOSIS — G31.84 MCI (MILD COGNITIVE IMPAIRMENT) WITH MEMORY LOSS: ICD-10-CM

## 2022-03-30 DIAGNOSIS — E78.49 OTHER HYPERLIPIDEMIA: ICD-10-CM

## 2022-03-30 DIAGNOSIS — F33.1 MODERATE EPISODE OF RECURRENT MAJOR DEPRESSIVE DISORDER (HCC): ICD-10-CM

## 2022-03-30 PROCEDURE — 99212 OFFICE O/P EST SF 10 MIN: CPT | Performed by: STUDENT IN AN ORGANIZED HEALTH CARE EDUCATION/TRAINING PROGRAM

## 2022-03-30 PROCEDURE — 99214 OFFICE O/P EST MOD 30 MIN: CPT | Performed by: STUDENT IN AN ORGANIZED HEALTH CARE EDUCATION/TRAINING PROGRAM

## 2022-03-30 RX ORDER — LISINOPRIL 40 MG/1
40 TABLET ORAL DAILY
COMMUNITY
Start: 2022-01-26 | End: 2023-05-22

## 2022-03-30 RX ORDER — GALANTAMINE HYDROBROMIDE 24 MG/1
28 CAPSULE, EXTENDED RELEASE ORAL
COMMUNITY
Start: 2022-02-14 | End: 2023-05-22

## 2022-03-30 ASSESSMENT — FIBROSIS 4 INDEX: FIB4 SCORE: 1.24

## 2022-03-30 ASSESSMENT — VISUAL ACUITY: VA_NORMAL: 1

## 2022-03-30 NOTE — PROGRESS NOTES
NEUROLOGY CLINIC FOLLOW-UP - 03/30/2022   REFERRING PROVIDER  No referring provider defined for this encounter.    PCP  Pcp Not In Computer   None   None     REASON FOR VISIT: Yadiel Moody 66 y.o. male presents today for follow-up for MCI, amnestic type, stress, NARCISO      INTERVAL HISTORY:  Added donepazil to nemanda last visit. Was having numbness and tingling in feet and numbness there too due aricept so this was switch to galantamine.  Dr. Ojeda made that switch. Continues Namenda and is tolerating. Dr. AQUINO also increased zoloft - feels he has depression contributing to his symptoms. He is not sure if this is benefiting     MRI-On my review of the MRI, he does have mild disproportionate atrophy of bitemporal lobes correlating with his symptoms. Overall, however, he is doing well. Adding low dose donepezil to the regimen.    Chronic sinus disease. Pain general mild ut one a month it can be severe.      Sleep is good. Uses BiPAP machine      Patient's PMH, PSH, FH, and SH were reviewed.    ROS: All review of systems complete and are negative except as documented    CURRENT MEDICATIONS AT THE TIME OF THIS ENCOUNTER:    Current Outpatient Medications:   •  lisinopril, 40 mg, Oral, DAILY, Taking  •  galantamine, 28 mg., Taking  •  lamoTRIgine, TAKE 1 TABLET BY MOUTH EVERY DAY (Patient taking differently: 100 mg), Taking  •  sertraline, 1 Tablet, Oral, BID, Taking  •  cetirizine, as needed., Taking  •  Memantine HCl ER, 28 mg, Oral, DAILY, Taking  •  magnesium citrate, 300 mL, Oral, Once, Taking  •  Potassium (POTASSIMIN PO), Take  by mouth. Takes daily, Taking  •  fluticasone, , Taking  •  therapeutic multivitamin-minerals, 1 Tablet, Oral, DAILY, Taking  •  aspirin, 81 mg, Oral, Q6HRS PRN, Taking  •  ascorbic acid, 500 mg, Oral, DAILY, Taking  •  ezetimibe, TK 1 T PO QD, Taking  •  omeprazole, 20 mg, Oral, DAILY, Taking  •  vitamin D, 4,000 Units, Oral, DAILY, Taking     EXAM:   Encounter Vitals  Standard  "Vitals  Vitals  Blood Pressure : 128/78  Temperature: 36.7 °C (98 °F)  Pulse: 88  Pulse Oximetry: 98 %  Height: 175.3 cm (5' 9\")  Weight: 120 kg (264 lb)  Encounter Vitals  Temperature: 36.7 °C (98 °F)  Blood Pressure : 128/78  Pulse: 88  Pulse Oximetry: 98 %  Weight: 120 kg (264 lb)  Height: 175.3 cm (5' 9\")  BMI (Calculated): 38.99  Pulmonary-Specific Vitals     Durable Medical Equipment-Specific Vitals          Physical Exam:  Physical Exam  Vitals reviewed.   Constitutional:       General: He is not in acute distress.     Appearance: Normal appearance. He is normal weight. He is not ill-appearing.   HENT:      Head: Normocephalic and atraumatic.      Nose: Nose normal.      Mouth/Throat:      Mouth: Mucous membranes are dry.   Eyes:      Extraocular Movements: EOM normal. No nystagmus.      Pupils: Pupils are equal, round, and reactive to light.   Cardiovascular:      Rate and Rhythm: Normal rate and regular rhythm.      Pulses: Normal pulses.      Heart sounds: Normal heart sounds.   Pulmonary:      Effort: Pulmonary effort is normal.      Breath sounds: Normal breath sounds.   Musculoskeletal:         General: No swelling, tenderness or deformity.      Cervical back: Normal range of motion.   Skin:     General: Skin is warm and dry.      Coloration: Skin is not jaundiced.      Findings: No bruising or lesion.   Neurological:      Mental Status: He is alert.      Coordination: Coordination is intact.      Deep Tendon Reflexes:      Reflex Scores:       Tricep reflexes are 2+ on the right side and 2+ on the left side.       Bicep reflexes are 2+ on the right side and 2+ on the left side.       Brachioradialis reflexes are 2+ on the right side and 2+ on the left side.       Patellar reflexes are 2+ on the right side and 2+ on the left side.       Achilles reflexes are 2+ on the right side and 2+ on the left side.  Psychiatric:         Mood and Affect: Mood normal.         Speech: Speech normal.         Behavior: " Behavior normal.         Thought Content: Thought content normal.         Judgment: Judgment normal.        Neurological Exam   Neurological Exam  Mental Status  Alert. Speech is normal. Language is fluent with no aphasia.    Cranial Nerves  CN II: Visual acuity is normal. Visual fields full to confrontation.  CN III, IV, VI: Extraocular movements intact bilaterally. No nystagmus. Normal saccades. Diminished smooth pursuit. Pupils equal round and reactive to light bilaterally.   Right pupil: 3 mm. Round. Reactive to light. Reactive to accommodation.   Left pupil: 3 mm. Round. Reactive to light. Reactive to accommodation.  Relative afferent pupillary defect absent.  CN V: Facial sensation is normal.  CN VII: Full and symmetric facial movement.  CN VIII: Hearing is normal.  CN IX, X: Palate elevates symmetrically  CN XI: Shoulder shrug strength is normal.  CN XII: Tongue midline without atrophy or fasciculations.    Motor  Normal muscle bulk throughout. No fasciculations present. Normal muscle tone. No abnormal involuntary movements. Strength is 5/5 in all four extremities except as noted.    Sensory  Light touch is normal in upper and lower extremities.  No right-sided hemispatial neglect. No left-sided hemispatial neglect.    Reflexes                                            Right                      Left  Brachioradialis                    2+                         2+  Biceps                                 2+                         2+  Triceps                                2+                         2+  Patellar                                2+                         2+  Achilles                                2+                         2+  Plantar                           Mute                Mute    Right pathological reflexes: Ankle clonus absent.  Left pathological reflexes: Ankle clonus absent.    Coordination    Finger-to-nose, rapid alternating movements and heel-to-shin normal bilaterally without  dysmetria.    Gait  Casual gait is normal including stance, stride, and arm swing. Able to rise from chair without using arms.       ALL DATA (I.e. labs, procedures, imaging, reports, clinical notes, etc.) FROM [facility] AND/OR OUTSIDE SOURCES, IF AVAILABLE, PERSONALLY REVIEWED:       ASSESSMENT, EDUCATION, AND COUNSELING:  This is a 66 y.o. male patient who presents to the neurology clinic. We had an extensive discussion about the patient's symptoms, signs, and work-up to date, if any. We discussed potential and/or definitive diagnoses, work-up, and potential treatments.       PLAN:  If applicable, the work-up such as labs, imaging, procedures, and/or other testing, referrals, and/or recommended treatment strategies are listed below.    Visit Diagnoses     ICD-10-CM   1. MCI (mild cognitive impairment) with memory loss  G31.84   2. Spells of decreased attentiveness  R68.89   3. Obstructive sleep apnea syndrome  G47.33   4. Stress  F43.9   5. Moderate episode of recurrent major depressive disorder (HCC)  F33.1   6. Pre-diabetes  R73.03   7. NARCISO (obstructive sleep apnea)  G47.33   8. Morbid obesity with BMI of 40.0-44.9, adult (HCC)  E66.01    Z68.41   9. Hypertension, unspecified type  I10   10. Nerve pain  M79.2   11. Visual-spatial disorientation  H53.8   12. Other hyperlipidemia   E78.49        Orders Placed This Encounter   • VITAMIN B12   • FOLATE   • HOMOCYSTEINE   • METHYLMALONIC ACID, SERUM   • CBC WITH DIFFERENTIAL   • Comp Metabolic Panel   • TSH   • HIV AG/AB COMBO ASSAY SCREENING   • VITAMIN D,25 HYDROXY   • Referral to Psychology   • lisinopril (PRINIVIL) 40 MG tablet   • galantamine (RAZADYNE ER) 24 MG ER capsule            Follow-up:   • 6 months    Items/Topics to follow-up on in future visits:  • Consider Brain PET  • Consider LP    QUALITY [employer]RICS ADDRESSED, IF APPLICABLE:  Colon cancer screening due. Colonoscopy ordered in July, 2021. Study is pending.      BILLING DOCUMENTATION:     I spent a total  of 30 minutes of face-to-face time in this visit. Over 50% of the time of the visit today was spent on counseling and/or coordination of care wtih the patient and/or family, as above in assessment in plan.    Lexx Clarke MD  Epilepsy and General Neurology  Department of Neurology  Clinical  of Neurology [facility].

## 2022-07-07 ENCOUNTER — HOSPITAL ENCOUNTER (OUTPATIENT)
Dept: LAB | Facility: MEDICAL CENTER | Age: 67
End: 2022-07-07
Attending: STUDENT IN AN ORGANIZED HEALTH CARE EDUCATION/TRAINING PROGRAM
Payer: MEDICARE

## 2022-07-07 LAB
25(OH)D3 SERPL-MCNC: 39 NG/ML (ref 30–100)
ALBUMIN SERPL BCP-MCNC: 4.3 G/DL (ref 3.2–4.9)
ALBUMIN/GLOB SERPL: 1.7 G/DL
ALP SERPL-CCNC: 78 U/L (ref 30–99)
ALT SERPL-CCNC: 27 U/L (ref 2–50)
ANION GAP SERPL CALC-SCNC: 11 MMOL/L (ref 7–16)
AST SERPL-CCNC: 17 U/L (ref 12–45)
BASOPHILS # BLD AUTO: 0.7 % (ref 0–1.8)
BASOPHILS # BLD: 0.05 K/UL (ref 0–0.12)
BILIRUB SERPL-MCNC: 0.4 MG/DL (ref 0.1–1.5)
BUN SERPL-MCNC: 17 MG/DL (ref 8–22)
CALCIUM SERPL-MCNC: 9.2 MG/DL (ref 8.5–10.5)
CHLORIDE SERPL-SCNC: 104 MMOL/L (ref 96–112)
CO2 SERPL-SCNC: 22 MMOL/L (ref 20–33)
CREAT SERPL-MCNC: 1.03 MG/DL (ref 0.5–1.4)
EOSINOPHIL # BLD AUTO: 0.16 K/UL (ref 0–0.51)
EOSINOPHIL NFR BLD: 2.3 % (ref 0–6.9)
ERYTHROCYTE [DISTWIDTH] IN BLOOD BY AUTOMATED COUNT: 45.3 FL (ref 35.9–50)
FOLATE SERPL-MCNC: 15.8 NG/ML
GFR SERPLBLD CREATININE-BSD FMLA CKD-EPI: 80 ML/MIN/1.73 M 2
GLOBULIN SER CALC-MCNC: 2.6 G/DL (ref 1.9–3.5)
GLUCOSE SERPL-MCNC: 103 MG/DL (ref 65–99)
HCT VFR BLD AUTO: 47.4 % (ref 42–52)
HCYS SERPL-SCNC: 15.63 UMOL/L
HGB BLD-MCNC: 16.6 G/DL (ref 14–18)
HIV 1+2 AB+HIV1 P24 AG SERPL QL IA: NORMAL
IMM GRANULOCYTES # BLD AUTO: 0.02 K/UL (ref 0–0.11)
IMM GRANULOCYTES NFR BLD AUTO: 0.3 % (ref 0–0.9)
LYMPHOCYTES # BLD AUTO: 1.96 K/UL (ref 1–4.8)
LYMPHOCYTES NFR BLD: 27.6 % (ref 22–41)
MCH RBC QN AUTO: 31.3 PG (ref 27–33)
MCHC RBC AUTO-ENTMCNC: 35 G/DL (ref 33.7–35.3)
MCV RBC AUTO: 89.4 FL (ref 81.4–97.8)
MONOCYTES # BLD AUTO: 0.83 K/UL (ref 0–0.85)
MONOCYTES NFR BLD AUTO: 11.7 % (ref 0–13.4)
NEUTROPHILS # BLD AUTO: 4.07 K/UL (ref 1.82–7.42)
NEUTROPHILS NFR BLD: 57.4 % (ref 44–72)
NRBC # BLD AUTO: 0 K/UL
NRBC BLD-RTO: 0 /100 WBC
PLATELET # BLD AUTO: 178 K/UL (ref 164–446)
PMV BLD AUTO: 10.6 FL (ref 9–12.9)
POTASSIUM SERPL-SCNC: 4 MMOL/L (ref 3.6–5.5)
PROT SERPL-MCNC: 6.9 G/DL (ref 6–8.2)
RBC # BLD AUTO: 5.3 M/UL (ref 4.7–6.1)
SODIUM SERPL-SCNC: 137 MMOL/L (ref 135–145)
TSH SERPL DL<=0.005 MIU/L-ACNC: 0.98 UIU/ML (ref 0.38–5.33)
VIT B12 SERPL-MCNC: 552 PG/ML (ref 211–911)
WBC # BLD AUTO: 7.1 K/UL (ref 4.8–10.8)

## 2022-07-07 PROCEDURE — 82306 VITAMIN D 25 HYDROXY: CPT | Mod: GA

## 2022-07-07 PROCEDURE — 85025 COMPLETE CBC W/AUTO DIFF WBC: CPT

## 2022-07-07 PROCEDURE — G0475 HIV COMBINATION ASSAY: HCPCS | Mod: GA

## 2022-07-07 PROCEDURE — 83921 ORGANIC ACID SINGLE QUANT: CPT

## 2022-07-07 PROCEDURE — 83090 ASSAY OF HOMOCYSTEINE: CPT

## 2022-07-07 PROCEDURE — 36415 COLL VENOUS BLD VENIPUNCTURE: CPT | Mod: GA

## 2022-07-07 PROCEDURE — 82746 ASSAY OF FOLIC ACID SERUM: CPT

## 2022-07-07 PROCEDURE — 80053 COMPREHEN METABOLIC PANEL: CPT

## 2022-07-07 PROCEDURE — 84443 ASSAY THYROID STIM HORMONE: CPT

## 2022-07-07 PROCEDURE — 82607 VITAMIN B-12: CPT

## 2022-07-09 LAB — METHYLMALONATE SERPL-SCNC: 0.12 UMOL/L (ref 0–0.4)

## 2022-08-08 ENCOUNTER — OFFICE VISIT (OUTPATIENT)
Dept: NEUROLOGY | Facility: MEDICAL CENTER | Age: 67
End: 2022-08-08
Attending: CLINICAL NEUROPSYCHOLOGIST
Payer: MEDICARE

## 2022-08-08 DIAGNOSIS — F33.41 RECURRENT MAJOR DEPRESSIVE DISORDER, IN PARTIAL REMISSION (HCC): ICD-10-CM

## 2022-08-08 DIAGNOSIS — R41.9 COGNITIVE COMPLAINTS WITH NORMAL NEUROPSYCHOLOGICAL EXAM: ICD-10-CM

## 2022-08-08 DIAGNOSIS — G47.33 OSA (OBSTRUCTIVE SLEEP APNEA): ICD-10-CM

## 2022-08-08 DIAGNOSIS — Z86.59 HISTORY OF POSTTRAUMATIC STRESS DISORDER (PTSD): ICD-10-CM

## 2022-08-08 PROCEDURE — 96116 NUBHVL XM PHYS/QHP 1ST HR: CPT | Performed by: CLINICAL NEUROPSYCHOLOGIST

## 2022-08-08 PROCEDURE — 96137 PSYCL/NRPSYC TST PHY/QHP EA: CPT | Performed by: CLINICAL NEUROPSYCHOLOGIST

## 2022-08-08 PROCEDURE — 96136 PSYCL/NRPSYC TST PHY/QHP 1ST: CPT | Performed by: CLINICAL NEUROPSYCHOLOGIST

## 2022-08-08 ASSESSMENT — ANXIETY QUESTIONNAIRES
6. BECOMING EASILY ANNOYED OR IRRITABLE: NOT AT ALL
5. BEING SO RESTLESS THAT IT IS HARD TO SIT STILL: NOT AT ALL
7. FEELING AFRAID AS IF SOMETHING AWFUL MIGHT HAPPEN: NOT AT ALL
IF YOU CHECKED OFF ANY PROBLEMS ON THIS QUESTIONNAIRE, HOW DIFFICULT HAVE THESE PROBLEMS MADE IT FOR YOU TO DO YOUR WORK, TAKE CARE OF THINGS AT HOME, OR GET ALONG WITH OTHER PEOPLE: NOT DIFFICULT AT ALL
1. FEELING NERVOUS, ANXIOUS, OR ON EDGE: NOT AT ALL
3. WORRYING TOO MUCH ABOUT DIFFERENT THINGS: SEVERAL DAYS
2. NOT BEING ABLE TO STOP OR CONTROL WORRYING: SEVERAL DAYS
GAD7 TOTAL SCORE: 3
4. TROUBLE RELAXING: SEVERAL DAYS

## 2022-08-08 ASSESSMENT — PATIENT HEALTH QUESTIONNAIRE - PHQ9
SUM OF ALL RESPONSES TO PHQ QUESTIONS 1-9: 7
5. POOR APPETITE OR OVEREATING: 3 - NEARLY EVERY DAY
CLINICAL INTERPRETATION OF PHQ2 SCORE: 1

## 2022-08-08 NOTE — PROGRESS NOTES
Neurobehavioral Status Exam and Neuropsychological Testing    Patient name: Yadiel Moody  Referral source: Lexx Clarke M.D.   MRN: [id number]  Evaluation date: 08/08/2022   YOB: 1955  Neuropsychologist: Gemini Casarez, Ph.D.         This evaluation was conducted for clinical treatment planning and may not be valid for other purposes. Potential risks and benefits, limits of confidentiality, and test procedures were discussed. Following this discussion, the patient consented to complete the evaluation. Information for this section was obtained from the medical record and a clinical interview with the patient and his wife conducted on 08/08/2022. Information included in this section is intended as a reference and should not be interpreted in the absence of the complete neuropsychological report. Please refer to the neuropsychological report for additional information including test results, impressions, and recommendations.     Background and Referral Information: The patient is a 66-year-old, , right-handed, White, male, retired supervisor and counselor, with 14 years of education, who has experienced cognitive decline in the context of a previous diagnosis of mild cognitive impairment. Dr. Clarke referred the patient for a neuropsychological evaluation to characterize his cognitive concerns, aid in differential diagnosis, and treatment planning.    Patient Active Problem List   Diagnosis   • CHEST PAIN   • Hypertension   • Hyperlipidemia   • Shortness of breath   • Obesity   • Umbilical hernia   • Memory problem   • Spells of decreased attentiveness   • NARCISO (obstructive sleep apnea)   • Morbid obesity with BMI of 40.0-44.9, adult (HCC)   • Pre-diabetes   • Major depressive disorder     Past Medical History:   Diagnosis Date   • Allergic rhinitis    • Anesthesia     slow to wake up; ?apneic, needs encourangement to breathe   • Back pain    • Chickenpox    • Citizen of [place] measles    • High  cholesterol    • Hyperlipidemia    • Hypertension    • Influenza    • Mumps    • Nasal drainage    • Obesity    • Pulmonary hypertension (HCC)    • Sleep apnea 2015    unable to use bipap right now due to sinus issue   • Tonsillitis       Past Surgical History:   Procedure Laterality Date   • UMBILICAL HERNIA REPAIR  2/23/2015    Performed by John H Ganser, M.D. at SUR[facility]ERY [facility]   • OTHER ORTHOPEDIC SURGERY  2009    Right Knee Repair   • OTHER ORTHOPEDIC SURGERY  1988    Right Meniscus   • [id number]     • NASAL RECONSTRUCTION     • NEPHRECTOMY LAPAROSCOPIC     • TONSILLECTOMY        Family History   Problem Relation Age of Onset   • Heart Attack Father    • Heart Disease Father    • Sleep Apnea Father         Current Outpatient Medications:   •  lamoTRIgine (LAMICTAL) 25 MG Tab, Take 4 Tablets by mouth every day for 180 days., Disp: 120 Tablet, Rfl: 5  •  lisinopril (PRINIVIL) 40 MG tablet, Take 40 mg by mouth every day., Disp: , Rfl:   •  galantamine (RAZADYNE ER) 24 MG ER capsule, 28 mg., Disp: , Rfl:   •  sertraline (ZOLOFT) 100 MG Tab, Take 1 Tablet by mouth 2 times a day., Disp: , Rfl:   •  cetirizine (ZYRTEC) 10 MG Tab, as needed., Disp: , Rfl:   •  Memantine HCl ER (NAMENDA) 28 MG CAPSULE SR 24 HR, TAKE 1 CAPSULE BY MOUTH EVERY DAY, Disp: 30 Capsule, Rfl: 5  •  magnesium citrate Solution, Take 300 mL by mouth one time., Disp: , Rfl:   •  Potassium (POTASSIMIN PO), Take  by mouth. Takes daily, Disp: , Rfl:   •  fluticasone (FLONASE) 50 MCG/ACT nasal spray, , Disp: , Rfl:   •  therapeutic multivitamin-minerals (THERAGRAN-M) Tab, Take 1 Tab by mouth every day., Disp: , Rfl:   •  aspirin (ASA) 325 MG Tab, Take 81 mg by mouth every 6 hours as needed., Disp: , Rfl:   •  ascorbic acid (ASCORBIC ACID) 500 MG Tab, Take 500 mg by mouth every day., Disp: , Rfl:   •  ezetimibe (ZETIA) 10 MG Tab, TK 1 T PO QD, Disp: , Rfl: 5  •  omeprazole (PRILOSEC) 20 MG delayed-release capsule, Take 20 mg by mouth every day.,  Disp: , Rfl:   •  vitamin D (CHOLECALCIFEROL) 1000 UNIT Tab, Take 4,000 Units by mouth every day., Disp: , Rfl:       Social History     Tobacco Use   • Smoking status: Never Smoker   • Smokeless tobacco: Never Used   Vaping Use   • Vaping Use: Never used   Substance and Sexual Activity   • Alcohol use: Yes     Comment: 0-1 glasses of wine a week   • Drug use: No   • Sexual activity: Not on file      Measures Administered: [place] Naming Test (BNT); Brief Visuospatial Memory Test - Revised (BVMT-R); Gosia-Garcia Executive Functioning System (DKEFS): Color-Word Interference (CWI) & Verbal Fluency (VF); Digit Vigilance Test (DVT); Executive Clock Drawing Test (CLOX); Generalized Anxiety Disorder 7 Item Scale (SHEFALI-7); Pedraza Verbal Learning Test - Revised (HVLT-R); Mini-Mental State Examination - 2nd Ed. Orientation (MMSE-2); Patient Health Questionnaire (PHQ-9); Performance Validity Tests; Repeatable Battery for the Assessment of Neuropsychological Status Line Orientation (RBANS LO); Test of Premorbid Functioning (ToPF); Trail Making Test A & B (TMT); Wechsler Adult Intelligence Scale - 4th Ed. (WAIS-IV): Block Design (BD), Digit Span (DS), Matrix Reasoning (MR), Similarities (SI), Vocabulary (VC), & Coding (CD); Wechsler Memory Scale - 4th Ed. Logical Memory (WMS-IV LM); and [place] Card Sorting Test - 128 (WCST-128). Informant Questionnaires: Activities of Daily Living Questionnaire (ADLQ) and Neuropsychiatric Inventory Questionnaire (NPI-Q).    Behavioral Observations: The patient arrived at the clinic on time and was accompanied by his wife, who participated in the clinical interview. Everybody wore facemasks given the COVID-19 pandemic. He was well groomed and dressed. He was alert and fully oriented to situation, person, place, and time (MMSE-2 Orientation = 10/10). He was polite and always maintained appropriate interpersonal boundaries. Rapport was easily established. Gait was unremarkable. No gross abnormal  movements or motor symptoms were observed. He was talkative and mildly tangential during the interview, but he was easily redirected. Comprehension was adequate for conversation and test instructions. Speech rate, volume, articulation, and prosody were normal. Speech content appropriate to context. Mood was euthymic. Affect was mood-congruent and appropriate to the situation. Insight into cognitive and emotional functioning was intact. There was no indication of hallucinations, delusions, or thought disorder. Vision (corrected with glasses) and hearing were adequate for the evaluation. He was attentive throughout the evaluation and had no difficulties with retention of task demands. Response style was unremarkable. There was no evidence of overt fatigue, frustration, impulsivity, or disinhibition. Overall, he was engaged and cooperative throughout testing.      Gemini Casarez, Ph.D.          Clinical Neuropsychologist          Department of Neurology          [facility]           One hour and 2 minutes were spent interviewing the patient and his wife (neurobehavioral status exam: 49288 = 1). Test administration and scoring were completed in 4 hours and 58 minutes (21437 = 1, 62488 = 9).

## 2022-08-08 NOTE — PATIENT INSTRUCTIONS
Thank you for your effort during today's evaluation. We will have a feedback session to discuss your results on 09/02/2022 at 10 AM.

## 2022-08-11 ENCOUNTER — OFFICE VISIT (OUTPATIENT)
Dept: SLEEP MEDICINE | Facility: MEDICAL CENTER | Age: 67
End: 2022-08-11
Payer: MEDICARE

## 2022-08-11 VITALS
WEIGHT: 274.5 LBS | HEIGHT: 69 IN | DIASTOLIC BLOOD PRESSURE: 84 MMHG | BODY MASS INDEX: 40.66 KG/M2 | RESPIRATION RATE: 16 BRPM | OXYGEN SATURATION: 95 % | HEART RATE: 56 BPM | SYSTOLIC BLOOD PRESSURE: 128 MMHG

## 2022-08-11 DIAGNOSIS — G47.33 OSA (OBSTRUCTIVE SLEEP APNEA): ICD-10-CM

## 2022-08-11 DIAGNOSIS — R41.3 MEMORY PROBLEM: ICD-10-CM

## 2022-08-11 DIAGNOSIS — I10 HYPERTENSION, UNSPECIFIED TYPE: ICD-10-CM

## 2022-08-11 PROCEDURE — 99213 OFFICE O/P EST LOW 20 MIN: CPT | Performed by: NURSE PRACTITIONER

## 2022-08-11 RX ORDER — OMEGA-3-ACID ETHYL ESTERS 1 G/1
2 CAPSULE, LIQUID FILLED ORAL 2 TIMES DAILY
COMMUNITY
Start: 2022-07-14 | End: 2023-05-22

## 2022-08-11 ASSESSMENT — FIBROSIS 4 INDEX: FIB4 SCORE: 1.19

## 2022-08-11 NOTE — PROGRESS NOTES
Chief Complaint   Patient presents with    Follow-Up     NARCISO- LAST SEEN ON 1/20/2022 - JENN MCCLURE   BiPAP IPAP/EPAP 8/4 cmH2O       HPI:       Mr. Moody is a 67 y/o male patient who is in today for NARCISO f/u. Patient lives in [place], [place]. PMH includes shortness of breath, hypertension, memory problem, obesity, hyperlipidemia, never smoker, tonsillectomy.     Patient was set up with a BiPAP [employer] machine in August 2020. Patient has registered the device and a prior order for a new BiPAP has been denied by insurance. He received a replacement machine around March 2022. Compliance report from 7/12/22-8/10/22 was downloaded and reviewed with the patient which showed  BiPAP IPAP/EPAP 8/4 cmH2O, 93.3% compliance, 6 hrs 35 min use, AHI of 7.6. He is tolerating the pressure and mask well. He denies morning headache or snoring. He goes to bed between 12-1 am and wakes up at 8 am.  He sleeps well through the night, and nocturia has decreased.  He continues to follow-up with PCP for blood pressure management.  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 He continues to also follow-up with psychiatry Dr. Cates for moderate depression which is currently managed with galantamine, lamotrigine, and sertraline.  He has noted about 20 to 25 pounds of weight gain since he has been on galantamine and will further review with his psychiatrist about alternatives for this medication. He tries to stay active by walking and do intermittent fasting however he has not been able to do intermittent fasting due to galantamine.  He also has had some limitations with mobility due to knee pain.  He will focus on diet and exercise to help decrease severity of NARCISO and would rather not adjust the BiPAP pressure at this time.    Sleep Study History:   PSG BiPAP titration from 11/26/19 indicated  BiPAP was started at 10/6 cm of water and titrated to 16/12 cm of water.  On BIPAP: 14/10 cm of water the resultant AHI was 5 in NREM sleep, however mean oximetry was 89%. On BiPAP: 16/12 cm of water mean oximetry improved to 91% SPO2 in REM sleep     HST on 2/20/19 by an outlying facility which showed severe NARCISO with AHI of 33.5/hr and O2 caryn 62%.     ROS:    Constitutional: Denies fevers, Denies weight changes  Eyes: Denies changes in vision, no eye pain  Ears/Nose/Throat/Mouth: Denies nasal congestion or sore throat   Cardiovascular: Denies chest pain or palpitations   Respiratory: Denies shortness of breath , Denies cough  Gastrointestinal/Hepatic: Denies abdominal pain, nausea, vomiting,   Skin/Breast: Denies rash,   Neurological: Denies headache, confusion,   Psychiatric: denies mood disorder   Sleep: denies snoring       Past Medical History:   Diagnosis Date    Allergic rhinitis     Anesthesia     slow to wake up; ?apneic, needs encourangement to breathe    Back pain     Chickenpox     [place]ij[place] measles     High cholesterol     Hyperlipidemia     Hypertension     Influenza     Mumps     Nasal drainage     Obesity     Pulmonary hypertension (HCC)     Sleep apnea 2015    unable to use bipap right now due to sinus issue    Tonsillitis        Past Surgical History:   Procedure Laterality Date    UMBILICAL HERNIA REPAIR  2/23/2015    Performed by John H Ganser, M.D. at SURGERY [facility]    OTHER ORTHOPEDIC SURGERY  2009    Right Knee Repair    OTHER ORTHOPEDIC SURGERY  1988    Right Meniscus    [id number]      NASAL RECONSTRUCTION      NEPHRECTOMY LAPAROSCOPIC      TONSILLECTOMY         Family History   Problem Relation Age of Onset    Heart Attack Father     Heart Disease Father     Sleep Apnea Father        Social History     Socioeconomic History    Marital status:      Spouse name: Not on file    Number of children: Not on file    Years of education: Not on file    Highest education level: Not on  file   Occupational History    Not on file   Tobacco Use    Smoking status: Never    Smokeless tobacco: Never   Vaping Use    Vaping Use: Never used   Substance and Sexual Activity    Alcohol use: Not Currently     Comment: 0-1 glasses of wine a week    Drug use: No    Sexual activity: Not on file   Other Topics Concern    Not on file   Social History Narrative    Not on file     Social Determinants of Health     Financial Resource Strain: Not on file   Food Insecurity: Not on file   Transportation Needs: Not on file   Physical Activity: Not on file   Stress: Not on file   Social Connections: Not on file   Intimate Partner Violence: Not on file   Housing Stability: Not on file       Allergies as of 08/11/2022 - Reviewed 08/11/2022   Allergen Reaction Noted    Crestor [rosuvastatin calcium]  12/28/2012        Vitals:  Vitals:    08/11/22 0939   BP: 128/84   Pulse: (!) 56   Resp: 16   SpO2: 95%       Current medications as of today   Current Outpatient Medications   Medication Sig Dispense Refill    omega-3 acid ethyl esters (LOVAZA) 1 GM capsule Take 2 g by mouth 2 times a day.      lamoTRIgine (LAMICTAL) 25 MG Tab Take 4 Tablets by mouth every day for 180 days. 120 Tablet 5    lisinopril (PRINIVIL) 40 MG tablet Take 40 mg by mouth every day.      galantamine (RAZADYNE ER) 24 MG ER capsule 28 mg.      sertraline (ZOLOFT) 100 MG Tab Take 1 Tablet by mouth 2 times a day.      cetirizine (ZYRTEC) 10 MG Tab as needed.      Memantine HCl ER (NAMENDA) 28 MG CAPSULE SR 24 HR TAKE 1 CAPSULE BY MOUTH EVERY DAY 30 Capsule 5    magnesium citrate Solution Take 300 mL by mouth one time.      Potassium (POTASSIMIN PO) Take  by mouth. Takes daily      fluticasone (FLONASE) 50 MCG/ACT nasal spray       therapeutic multivitamin-minerals (THERAGRAN-M) Tab Take 1 Tab by mouth every day.      aspirin (ASA) 325 MG Tab Take 81 mg by mouth every 6 hours as needed.      ascorbic acid (ASCORBIC ACID) 500 MG Tab Take 500 mg by mouth every  day.      ezetimibe (ZETIA) 10 MG Tab TK 1 T PO QD  5    omeprazole (PRILOSEC) 20 MG delayed-release capsule Take 20 mg by mouth every day.      vitamin D (CHOLECALCIFEROL) 1000 UNIT Tab Take 4,000 Units by mouth every day.       No current facility-administered medications for this visit.         Physical Exam: Limited by COVID-19 precautions.  Appearance: Well developed, well nourished, no acute distress  Eyes: PERRL, EOM intact, sclera white, conjunctiva moist  Ears: no lesions or deformities  Hearing: grossly intact  Nose: no lesions or deformities  Respiratory effort: no intercostal retractions or use of accessory muscles  Extremities: no cyanosis or edema  Abdomen: soft, large  Gait and Station: normal  Digits and nails: no clubbing, cyanosis, petechiae or nodes.  Cranial nerves: grossly intact  Skin: no visible rashes, lesions or ulcers noted  Orientation: Oriented to time, person and place  Mood and affect: mood and affect appropriate, normal interaction with examiner  Judgement: Intact    Assessment:  1. NARCISO (obstructive sleep apnea)  DME Mask and Supplies      2. Hypertension, unspecified type        3. BMI 40.0-44.9, adult (HCC)        4. Memory problem              Plan  Discussed the cardiovascular and neuropsychiatric risks of untreated NARCISO; including but not limited to: HTN, DM, MI, ASCVD, CVA, CHF, traffic accidents.     1. Compliance report from 7/12/22-8/10/22 was downloaded and reviewed with the patient which showed  BiPAP IPAP/EPAP 8/4 cmH2O, 93.3% compliance, 6 hrs 35 min use, AHI of 7.6..  Patient is compliant and benefiting from BiPAP therapy for management of NARCISO. Advised patient to continue to use the BiPAP every night for more than four hours for optimal health benefit.    *DME order (Joel) for mask (FFM or MOC) and supplies was placed today. Continue to clean mask and supplies weekly with soap and water, and change supplies per insurance guidelines.     *Sleep hygiene discussed. Recommend  keeping a set sleep/wake schedule. Logging enough hours of sleep. Limiting/Avoiding naps. No caffeine after noon and no heavy meals in the evening.     **Recall of [employer] CPAP machines was reviewed with the patient during 1/20/22 OV. He received a replacement machine around March 2022.   [place] your device on the recall website at [email or web address]     2. Continue to f/u with PCP for BP management.  3. Encouraged a healthy diet and exercise to help with weight loss and management. 274 lbs today. Patient will focus on diet and exercise to help decrease severity of NARCISO instead of adjusting BiPAP pressure at this time.   If your BMI is 25-29.9 you are overweight. If your BMI is 30 or greater you are obese. To lose weight eat less, move more, or both. Any diet that reduces caloric intake can help with weight loss. Extra weight may reduce your lifespan. Avoid dramatic unsustainable dietary changes that result in the yo-yo effect (down then back up.)  Usually small modifications in diet and exercise are easier to stick with.  4.  Continue to f/u with [facility].   5. Follow up with appropriate healthcare providers for all other medical problems. Continue to follow-up with psychiatry Dr. Lucia.  6. F/u in 6 months for NARCISO, sooner if needed.       MACK Lloyd.      This dictation was created using voice recognition software. The accuracy of the dictation is limited to the abilities of the software. I expect there may be some errors of grammar and possibly content.

## 2022-09-02 ENCOUNTER — OFFICE VISIT (OUTPATIENT)
Dept: NEUROLOGY | Facility: MEDICAL CENTER | Age: 67
End: 2022-09-02
Attending: CLINICAL NEUROPSYCHOLOGIST
Payer: MEDICARE

## 2022-09-02 DIAGNOSIS — R41.9 COGNITIVE COMPLAINTS WITH NORMAL NEUROPSYCHOLOGICAL EXAM: ICD-10-CM

## 2022-09-02 DIAGNOSIS — Z86.59 HISTORY OF POSTTRAUMATIC STRESS DISORDER (PTSD): ICD-10-CM

## 2022-09-02 DIAGNOSIS — F33.41 RECURRENT MAJOR DEPRESSIVE DISORDER, IN PARTIAL REMISSION (HCC): ICD-10-CM

## 2022-09-02 DIAGNOSIS — G47.33 OSA (OBSTRUCTIVE SLEEP APNEA): ICD-10-CM

## 2022-09-02 PROCEDURE — 96133 NRPSYC TST EVAL PHYS/QHP EA: CPT | Performed by: CLINICAL NEUROPSYCHOLOGIST

## 2022-09-02 PROCEDURE — 96132 NRPSYC TST EVAL PHYS/QHP 1ST: CPT | Performed by: CLINICAL NEUROPSYCHOLOGIST

## 2022-09-02 NOTE — PROGRESS NOTES
"CONFIDENTIAL NEUROPSYCHOLOGICAL EVALUATION REPORT    Patient name: Yadiel Moody  Referral source: Lexx Clarke M.D.   MRN: [id number]  Evaluation date: 2022   YOB: 1955  Neuropsychologist: Gemini Casarez, Ph.D.         This evaluation was conducted for clinical treatment planning and may not be valid for other purposes. Potential risks and benefits, limits of confidentiality, and test procedures were discussed. Following this discussion, Mr. Moody consented to complete the evaluation. Information for this report was obtained from the medical record, neuropsychological testing, and a clinical interview with Mr. Moody and his wife conducted on 2022. Feedback, including review of results and recommendations, was conducted on 2022. Neurobehavioral status exam: 72250 = 1. Test administration and scorin = 1, 13438 = 9. Test evaluation services: 40839 = 1, 87571 = 3.    Background and Referral Information: Mr. Moody is a 66-year-old, , right-handed, White, male, retired supervisor and counselor, with 14 years of education, who has experienced cognitive decline in the context of a previous diagnosis of mild cognitive impairment. Additional medical history is relevant for hypertension, hyperlipidemia, prediabetes, obesity, obstructive sleep apnea (NARCISO; uses BiPAP nightly), and spells of decreased attentiveness. Dr. Clarke referred Mr. Moody for a neuropsychological evaluation to characterize his cognitive concerns, aid in differential diagnosis, and treatment planning.    Previous Studies: Neurological exam (2022) was unremarkable. Cognitive screener (10/23/2020) was within normal limits (Cuney Cognitive Assessment; MOCA = 30/30). MRI of the brain (2017) documented: \"1. Age-related cerebral atrophy. 2. Mild periventricular white matter changes consistent with chronic microvascular ischemic gliosis.\" EEG (24 hours; 2020) revealed: \"This is a normal " "ambulatory EEG recording in the awake and drowsy/sleep state(s). There were multiple push-button events and/or ambulatory diary entries as noted above with no abnormal EEG correlate. Clinical correlation is recommended.\" EEG (12/13/2019) documented: \"This is abnormal EEG. No seizures were recorded however there was regional slowing over left hemisphere (as described above) which may signify structural deficit or area of increased cortical irritability. Clinical and imaging correlation is advised.”      Previous Evaluation: Mr. Moody underwent brief neuropsychological testing with Dr. Ojeda on 09/03/2021. Per Dr. Kimball's progress note, results revealed a mild impairment in executive function (abstractional reasoning). Language and memory function were within normal limits. Diagnostic impression was that test results were consistent with excess disability from depression. It was noted cerebrovascular contributions were also possible and additional neuroimaging was recommended (e.g., PET-CT). Mr. Moody has continued to engage in psychotherapy sessions with Dr. Ojeda and he carries diagnoses of major depressive disorder (MDD) and post-traumatic stress disorder (PTSD).      Neuropsychological Findings and Impressions    Presenting Concerns Summary: Mr. Moody reported onset of more prominent cognitive difficulties approximately 5 years ago. He noted his cognitive problems were worsening, but have remained largely stable since he began taking memantine in 2018. His wife denied observing significant cognitive problems and attributed his concerns to normal aging. Currently, Mr. Moody reported difficulties with short-term memory, attention, word finding, navigation, and aspects of executive function. He remains independent in all basic and instrumental activities of daily living (ADLs). He reported mild symptoms of depression on a self-report measure and during the clinical interview, but he explained these " symptoms are not causing significant distress or interfering with daily functioning. He also reported increased worry related to his memory problems and marital stress (please see below the recommendations section for additional information).    Results Summary/Interpretation: Mr. Moody's general intellectual ability was in the high average range, largely consistent with his estimated premorbid ability. Considering his high level of premorbid and intellectual functioning, current test scores in the low average range that were not corrected for educational level are considered relative weaknesses. As such, rote verbal memory encoding, clock drawing, and clock copy were relatively weak. However, these isolated weaknesses are consistent with normal testing variability in the context of within normal limits performance on all the remaining measures across cognitive domains including attention/working memory, processing speed, language, visual perception/construction, memory (encoding, consolidation, and retrieval of structured verbal and visual information), and executive functioning. Semantic verbal fluency and naming were relative cognitive strengths.     Raw and standard scores from his previous neuropsychological evaluation (09/03/2021) were not available for review. Therefore, direct comparisons of performance were not possible. Current comparisons were made based on the information available in the note described above and should be interpreted with caution as qualitative descriptors of performance can vary among providers. With this caveat, there was a marked improvement in abstract verbal reasoning (WAIS-IV Similarities; from mildly impaired to high average). Otherwise, his current overall performance was stable compared to his previous evaluation, which also described normal memory functioning.     Impression: Mr. Moody's neurocognitive profile is consistent with normal aging. All cognitive abilities measured  were intact, with multiple cognitive strengths. The overall stability and improvement in performance compared to his previous evaluation argues against the presence of an underlying neurodegenerative disorder. Possible contributing factors to his subjective cognitive difficulties include hearing loss (he is in the process of obtaining hearing aids), current stressors, mild symptoms of depression, and his history of PTSD. Depression, stress, and PTSD symptoms are known to have deleterious effects in aspects of attention and executive function, which may interfere with encoding and retrieval of information and be perceived as memory problems day-to-day. Amelioration of these factors may lead to a relative improvement in perceived daily cognitive functioning. Normal cognitive decline associated with aging is likely an additional contributor. His current pattern of performance (intact language and memory) is not consistent with the early stages of Alzheimer's disease. This evaluation may serve as a baseline for longitudinal tracking.    Mr. Moody expressed concern for longstanding attention deficit hyperactivity disorder (ADHD). His cognitive profile was not consistent with ADHD. Although he reported attention problems during his school years, these problems never significantly interfered with daily functioning in educational and occupational settings. As such, his presentation is more consistent with longstanding subclinical attention problems that are likely exacerbated by stressors and emotional distress.      Recommendations:    Based on the present results, a repeat neuropsychological evaluation is not currently indicated. If there is a considerable change in cognitive or emotional status, another evaluation may be warranted. At that time, diagnostic impressions and recommendations will be revised and updated.  Mr. Moody should be reassured that there is no current evidence for cognitive dysfunction. Notably, his  overall performance was largely average to above average compared to similarly aged peers. As such, from a neuropsychological standpoint, he has the cognitive capacity to continue to be successful in his current and future recreational/occupational endeavors.   Mr. Moody is encouraged to use hearing aids consistently when he receives them to rule out possible sensory contributions to his subjective cognitive difficulties.   Considering his psychiatric history, current stressors, and mild symptoms of depression, he is encouraged to continue attending counseling sessions with Dr. Ojeda.  Continued use of environmental compensatory strategies is recommended to reduce cognitive demands. This may include setting scheduled routines, having an established location for essential items (e.g., wallet, glasses, and keys), completing tasks when there are no time demands, completing one task at a time, breaking down demanding tasks into smaller components, working on projects for shorter periods, maximizing time when he feels the most alert, minimizing external distractions, paraphrasing and repeating to be learned information, and using external aids for reminders (e.g., planner, calendar, setting alarms, labels, to-do lists) consistently.   In light of his medical history, cerebrovascular disease represents a significant risk factor for future cognitive decline, and Mr. Moody is encouraged to reduce vascular risk factors per his providers' treatment recommendations (e.g., healthy diet, exercise, and medication adherence). The book Undo It!: How Simple Lifestyle Changes Can Reverse Most Chronic Diseases by Harish Lorenzo and Mayra Lorenzo may be a helpful resource for Mr. Moody and his family. They may also benefit from resources available through the American Heart Association (https://www.heart.org/ or 1-876.487.6291), which offers psychoeducational information and services for individuals with vascular risk factors.     Fransisco is encouraged to regularly engage in social and physical recreation, as well as cognitively stimulating activities (e.g., puzzles, games, [employer] Jose. https://www.[employer], reading, exercise, social gatherings) to promote brain health and emotional well-being.   The books Biohack Your Brain: How to Boost Cognitive Health, Performance & Power by Dr. eHtal Up, Your Memory: How It Works and How to Improve It by Donte Hair, and [facility] Guide to Achieving Optimal Memory by Rony Jack and Sherrell Lorenzo may also be helpful resources for Mr. Moody and his family.  If not already done so, Mr. Moody and his family members are encouraged to discuss legal and financial affairs, such as establishing a will and power of , to assist Mr. Moody with future financial and healthcare decisions. Information regarding these issues can be found at www.caringinfo.org or www.agingwithdignity.org/5wishes.html.    Presenting Concerns:     Cognitive Functioning: Mr. Moody noted he initially experienced mild short-term memory problems (e.g., forgetting what he read) following a knee surgery in 1988. He then had mild memory problems at work (e.g., forgetting conversations) that contributed to his MCFP in 2009. He reported onset of more prominent cognitive difficulties approximately 5 years ago. He noted his cognitive problems were worsening, but have remained largely stable since he began taking memantine in 2018. He also has been taking galantamine for the past 5 months. His wife denied observing significant cognitive problems and attributed Mr. Moody's concerns to normal aging. Currently, Mr. Moody reported misplacing items, word finding problems, taking longer to retain new information, and forgetting recent conversations, what he is about to say, and names (of people and objects). Reminders such as using notes and a calendar are typically beneficial. Mr. Moody reported longstanding  attention problems. He stated he suspected he had ADHD during his school years, but he was never formally diagnosed. He noted that in the past 2 years focusing has become more effortful (e.g., while reading). Additionally, hearing loss interferes with auditory attention at times. His wife reported observing he is easily distracted when he multitasks, but this is not a significant change. Mr. Moody also reported it takes him longer to plan and organize compared to before. He noted he occasionally gets lost while driving in familiar and unfamiliar locations, but he is typically able to reorient himself after some time. He explained this happens more frequently when he does not take memantine. His wife denied significant concerns about him getting lost while driving. Mr. Moody denied difficulties with decision-making, problem solving, and comprehension.     Functional Abilities: Mr. Moody and his wife reported he remains independent in all self-care and instrumental (ADLs) including medication management, finances, shopping, scheduling appointments, cooking, household responsibilities, and driving (no recent tickets or accidents). He noted he occasionally forgets to take his medications, but this has not led to any significant concerns.     Emotional Functioning: As noted above, Mr. Moody has a history of MDD and PTSD. He reported his symptoms of depression and PTSD have largely improved since he began attending psychotherapy sessions with Dr. Ojeda in 2021. He currently attends counseling sessions approximately once per month. Dr. Ojeda also partly manages his psychotropic medications and he takes sertraline. Currently, Mr. Modoy reported mild loss of interest in activities, occasional feelings of personal failure, and increased worry regarding his memory problems. He and his wife also reported marital stress, noting it is mostly situational. He also reported increased appetite related to medication effects.  Additionally, he noted occasional intrusive thoughts regarding past traumatic experiences in the  and working as a /counselor. He denied any other significant PTSD symptoms. He explained he has largely processed past traumatic experiences in counseling. He denied persistent sadness, anhedonia, suicidal ideation, decreased energy, and sleep problems. His wife agreed with his report. There were no reports of manic symptoms, social withdrawal, personality changes, hallucinations, or delusions.      Sensory/Physical/Motor Changes: Mr. Moody reported he was recommended to wear hearing aids by audiology and he should receive them within the next month. He denied recent declines in his vision, taste, or smell. He reported mild chronic generalized joint pain that he attributed to aging. He also noted mild balance problems related to medication effects. Additionally, he reported occasional urinary urgency. He denied headaches, weakness, frequent falls, and fine or gross motor problems.      Medical History: Per his electronic medical record, it includes hypertension, hyperlipidemia, prediabetes, obesity, NARCISO (uses biPAP nightly), pulmonary hypertension, MCI, spells of decreased attentiveness, umbilical hernia, tonsillitis, allergic rhinitis, B[place] measles, and mumps. He reported he also has macular degeneration and had COVID-19 in September 2021(recovered without complications). He saw Dr. Gaming and Dr. Morales in our clinic in 2019; he had an abnormal EEG and has been taking lamotrigine for spells of decreased attentiveness since then. His most recent EEG was normal and he denied a known history of overt seizures. He reported he had an episode of visual disturbance that might have been a transient ischemic attack in 1995. However, he noted he also had pesticide (PCB) sprayed on his face around that time. This was a one-time exposure without significant acute toxicity symptoms. There is no reported  history of known traumatic head injuries. Surgical history includes umbilical hernia repair (2015), right knee surgeries (1988 and 2009), nasal reconstruction, laparoscopic nephrectomy, and tonsillectomy. Hospitalizations within the past month were denied.    Mental Health History: Remarkable for MDD and PTSD. As noted above, he is followed by Dr. Ojeda and symptoms are reportedly currently well controlled. Prior to working with Dr. Ojeda, he denied any other history of mental health treatment or diagnosis, including psychiatric hospitalizations.    Family Medical History: Remarkable for heart attack, heart disease, sleep apnea, cancer, and ADHD (daughter).     Medications and Supplements: Omega-3, lamotrigine, lisinopril, galantamine, sertraline, cetirizine, memantine, magnesium, potassium, fluticasone, multivitamin, aspirin, ascorbic acid, ezetimibe, omeprazole, and vitamin D.     Psychosocial History: Mr. Moody was born in [place], [place], and raised in [place], [place]. He denied a known history of birth complications and early developmental delays. He earned a high school diploma and then completed 85 college credits. As previously mentioned, he reported persistent attention problems during his school years, though noted he was a good student and denied significant academic difficulties. He was never formally diagnosed with a learning/neurodevelopmental disorder. He was in the [employer] for approximately 25 years (6 years of active duty). 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 He currently lives with his wife of 32 years in a private residence. He reported one previous marriage. He has 2 children and 2 grandchildren. He reported good social support from his family and friends. Hobbies and activities include hiking, biking, and shooting. He is also actively engaged  as a [facility] district  and attends meetings regularly.     Substance Use: Mr. Moody reported he drinks an average of one glass of wine per month. He denied current use of illicit or recreational drugs, including nicotine products. There is no reported history of substance use disorder or treatment.     Measures Administered: Berkey Naming Test (BNT); Brief Visuospatial Memory Test - Revised (BVMT-R); Gosia-Garcia Executive Functioning System (DKEFS): Color-Word Interference (CWI) & Verbal Fluency (VF); Digit Vigilance Test (DVT); Executive Clock Drawing Test (CLOX); Generalized Anxiety Disorder 7 Item Scale (SHEFALI-7); Pedraza Verbal Learning Test - Revised (HVLT-R); Mini-Mental State Examination - 2nd Ed. Orientation (MMSE-2); Patient Health Questionnaire (PHQ-9); Performance Validity Tests; Repeatable Battery for the Assessment of Neuropsychological Status Line Orientation (RBANS LO); Test of Premorbid Functioning (ToPF); Trail Making Test A & B (TMT); Wechsler Adult Intelligence Scale - 4th Ed. (WAIS-IV): Block Design (BD), Digit Span (DS), Matrix Reasoning (MR), Similarities (SI), Vocabulary (VC), & Coding (CD); Wechsler Memory Scale - 4th Ed. Logical Memory (WMS-IV LM); and Wisconsin Card Sorting Test - 128 (WCST-128). Informant Questionnaires: Activities of Daily Living Questionnaire (ADLQ) and Neuropsychiatric Inventory Questionnaire (NPI-Q).    Behavioral Observations: Mr. Moody arrived at the clinic on time and was accompanied by his wife, who participated in the clinical interview. Everybody wore facemasks given the COVID-19 pandemic. He was well groomed and dressed. He was alert and fully oriented to situation, person, place, and time (MMSE-2 Orientation = 10/10). He was polite and always maintained appropriate interpersonal boundaries. Rapport was easily established. Gait was unremarkable. No gross abnormal movements or motor symptoms were observed. He was talkative and mildly tangential during the  interview, but he was easily redirected. Comprehension was adequate for conversation and test instructions. Speech rate, volume, articulation, and prosody were normal. Speech content appropriate to context. Mood was euthymic. Affect was mood-congruent and appropriate to the situation. Insight into cognitive and emotional functioning was intact. There was no indication of hallucinations, delusions, or thought disorder. Vision (corrected with glasses) and hearing were adequate for the evaluation. He was attentive throughout the evaluation and had no difficulties with retention of task demands. Response style was unremarkable. There was no evidence of overt fatigue, frustration, impulsivity, or disinhibition. Overall, he was engaged and cooperative throughout testing.    Results & Key Findings: Please note that scores reported are for professional use only. For diagnostic purposes, a performance score that falls below the 9th percentile of the reference group for that measure may be considered a cognitive deficit depending on the overall pattern of performance. The following clinical descriptors identify performance within the range of percentile scores indicated in the parentheses: Exceptionally High Score (>98th), Above Average Score (91st-97th), High Average Score (75th-90th), Average Score (25th-74th), Low Average Score (9th-24th), Below Average Score (3rd-8th), and Exceptionally Low Score (<2nd). Please see the attached test results summary table in the appendix for a list of measures administered as well as raw and normative scores, which are listed in the designated columns. All such scores are based on age-corrected norms and certain scores may be adjusted for education and/or other demographic factors as appropriate (e.g., TMT, WCST-128, BNT, and DVT).     Data Validity: Mr. Membrenos performance on embedded and standalone validity measures was within the valid range, suggesting he appropriately engaged in  testing. The following test results are considered an accurate representation of his current level of cognitive functioning.    Premorbid and Estimated Intelligence: A predicted estimate of premorbid intellectual functioning based on age and his performance on a single word-reading test fell within the high average range (TOPF). Based on demographic factors, his premorbid ability was estimated in the average range (TOPF). He was administered subtests from a measure of general intellectual functioning (WAIS-IV), yielding a prorated General Ability Index (GAI) of 118, which is in the high average range compared to similarly aged peers, consistent with his estimated premorbid ability based on word reading. Among underlying abilities, verbal comprehension/reasoning (prorated VCI = 114) and visuospatial/perceptual reasoning (prorated SAMPSON = 117) were both high average compared to similarly aged peers.     Cognitive Functioning:     Mr. Membrenos performance in the following areas was within to above expectation:     Attention/Working Memory: Basic auditory attention span (forward number repetition) and working memory (backward/sequenced number repetition; WAIS-IV DS). Sustained visual attention (scanning/vigilance; DVT Errors).  Processing Speed: Rapid color naming (DKEFS CWI), simple word reading speed (DKEFS CWI), visuomotor number sequencing speed (TMT A), sustained psychomotor visual scanning speed (DVT Errors), and speeded code transcription (WAIS-IV CD).   Language: Visual confrontation naming (BNT), single word reading (TOPF), phonemic verbal fluency (DKEFS VF), semantic verbal fluency (DKEFS VF), abstract verbal reasoning (WAIS-IV SI), and general fund of knowledge (WAIS-IV IN).   Visual Perception/Construction: Judgment of line orientation (RBANS LO), simple figure copy (BVMT-R Copy), clock copy (CLOX 2), abstract/deductive visual reasoning (WAIS-IV MR), and construction of block designs (WAIS-IV BD).   Memory:  Encoding, delayed recall, and recognition of rote verbal information (wordlist; HVLT-R), structured verbal information (short stories; WMS-IV LM), and visual information (simple geometric figures; BVMT-R).   Executive Functioning: Visuomotor set shifting (TMT B), phonemic verbal fluency (DKEFS VF), semantic verbal set shifting (DKEFS VF), response inhibition (DKEFS CWI), response inhibition/switching (DKEFS CWI), freehand clock drawing/conceptualization (CLOX 1), abstract verbal reasoning (WAIS-IV SI), abstract/deductive visual reasoning (WAIS-IV MR), and novel problem solving (WCST-128).    Self-Report Questionnaires: Mr. Moody's responses on self-report inventories of emotional functioning were indicative of mild levels of depression (PHQ-9) and minimal levels of anxiety (SHEFALI-7) over the past two weeks. He reported his symptoms of depression make it “not difficult at all” to function in various settings.    Informant Questionnaires: His wife completed a questionnaire about Mr. Moody's ability to function independently, implying a minimal level of impairment in activities of daily living (ADLQ). On a questionnaire regarding neuropsychiatric symptoms, his wife reported observing increased appetite (NPI-Q).      Thank you for allowing me to participate in Mr. Moody's care. If I can be of further assistance, please do not hesitate to contact me.    Gemini Casarez, Ph.D.          Clinical Neuropsychologist          Department of Neurology          [facility]             Appendix:            This report was created using voice recognition software. I have made every reasonable attempt to avoid dictation errors, but this document may contain an error not identified before finalizing. If the error changes the accuracy of the document, I would appreciate it being brought to my attention. Thank you.    One hour and 2 minutes were spent interviewing Mr. Moody and his wife (08/08/2022; neurobehavioral status exam: 02217 =  1). Test administration and scoring were completed in 4 hours and 58 minutes (08/08/2022; 84921 = 1, 24656 = 9).  Four hours and 12 minutes were spent in clinical decision-making, chart review, records reviews, interpretation of test results and clinical data, integration of patient data, interactive feedback to Mr. Moody and his wife, and report preparation (08/08/2022 - 09/05/2022; test evaluation services: 29776 = 1, 70283 = 3).

## 2022-09-02 NOTE — PROGRESS NOTES
NEUROPSYCHOLOGICAL FEEDBACK    Name: Yadiel Modoy    MRN: [id number]   YOB: 1955   Date of Feedback: 09/02/2022  Referred by: Lexx Clarke M.D.  Evaluated by: Gemini Casarez, Ph.D.        The patient and his spouse were seen for an interactive feedback session regarding the patient's neuropsychological evaluation on 08/08/2022. They reported he has been stable in terms of cognitive, functional, emotional, and physical functioning since the neuropsychological evaluation. I discussed the results of the evaluation and the recommendations in detail with the patient as well as his spouse and they expressed understanding. The discussion included psychoeducation about normal cognitive decline associated with aging, mild cognitive impairment, cerebrovascular disease, and different dementia etiologies. Psychoeducation was also provided regarding how depression, PTSD, elevated stress, anxiety, and sleep apnea can affect cognition. Additional information was provided regarding lifestyle factors associated with brain health. The patient and his spouse were afforded time to ask questions and all their questions were addressed.      Gemini Casarez, Ph.D.                                                                             Clinical Neuropsychologist                                                                               Department of Neurology                                                                      [facility]     Forty-three minutes were spent conducting an interactive feedback session with the patient and his wife ([id number]; billed as part of the neuropsychological evaluation).

## 2022-11-04 ENCOUNTER — TELEPHONE (OUTPATIENT)
Dept: NEUROLOGY | Facility: MEDICAL CENTER | Age: 67
End: 2022-11-04
Payer: MEDICARE

## 2022-11-04 NOTE — TELEPHONE ENCOUNTER
Established Patient     [facility] Patient is checked in Patient Demographics? Yes    Is visit type and length correct?  Yes    Is referral attached to visit? Yes    Were records received from referring provider? No, established patient with provider.     Patient was not contacted to have someone accompany them to visit?    Is this appointment scheduled as a Hospital Follow-Up?  No    Does the patient require any pre procedure or post procedure follow up? No    If any orders were placed at last visit or intended to be done for this visit do we have Results/Consult Notes? No  Labs - Labs were not ordered at last office visit.  Note: If patient appointment is for lab review and patient did not complete labs, check with provider if OK to reschedule patient until labs completed.  Imaging - Imaging was not ordered at last office visit.  Referrals - No referrals were ordered at last office visit.        10.  If patient appointment is for Botox - is order pended for provider? No

## 2023-05-10 ENCOUNTER — TELEPHONE (OUTPATIENT)
Dept: CARDIOLOGY | Facility: PHYSICIAN GROUP | Age: 68
End: 2023-05-10
Payer: MEDICARE

## 2023-05-10 NOTE — TELEPHONE ENCOUNTER
Spoke to patient in regards to obtaining records for NP appointment with DANIELLE. Per patient has had many hospitalizations and testing done in past 6 months and is unable to recollect accurate information on who or where patient has been seen.

## 2023-05-11 ENCOUNTER — TELEPHONE (OUTPATIENT)
Dept: CARDIOLOGY | Facility: MEDICAL CENTER | Age: 68
End: 2023-05-11
Payer: MEDICARE

## 2023-05-11 NOTE — TELEPHONE ENCOUNTER
Spoke to patient in regards to records for NP appointment with Dr. Lockett. Patient has been treated by a cardiologist and had labs and imaging done about 5-6 years ago at [facility] in [place], [place]. Confirmed appt date, time and location.

## 2023-05-22 ENCOUNTER — OFFICE VISIT (OUTPATIENT)
Dept: CARDIOLOGY | Facility: MEDICAL CENTER | Age: 68
End: 2023-05-22
Attending: INTERNAL MEDICINE
Payer: MEDICARE

## 2023-05-22 VITALS
HEIGHT: 69 IN | OXYGEN SATURATION: 93 % | SYSTOLIC BLOOD PRESSURE: 100 MMHG | HEART RATE: 86 BPM | DIASTOLIC BLOOD PRESSURE: 62 MMHG | BODY MASS INDEX: 31.1 KG/M2 | WEIGHT: 210 LBS | RESPIRATION RATE: 16 BRPM

## 2023-05-22 DIAGNOSIS — E78.00 PURE HYPERCHOLESTEROLEMIA: ICD-10-CM

## 2023-05-22 DIAGNOSIS — I10 PRIMARY HYPERTENSION: ICD-10-CM

## 2023-05-22 DIAGNOSIS — R07.89 OTHER CHEST PAIN: ICD-10-CM

## 2023-05-22 PROCEDURE — 99212 OFFICE O/P EST SF 10 MIN: CPT | Performed by: INTERNAL MEDICINE

## 2023-05-22 PROCEDURE — 93005 ELECTROCARDIOGRAM TRACING: CPT | Performed by: INTERNAL MEDICINE

## 2023-05-22 PROCEDURE — 3078F DIAST BP <80 MM HG: CPT | Performed by: INTERNAL MEDICINE

## 2023-05-22 PROCEDURE — 3074F SYST BP LT 130 MM HG: CPT | Performed by: INTERNAL MEDICINE

## 2023-05-22 PROCEDURE — 99205 OFFICE O/P NEW HI 60 MIN: CPT | Mod: 25 | Performed by: INTERNAL MEDICINE

## 2023-05-22 RX ORDER — ATORVASTATIN CALCIUM 40 MG/1
40 TABLET, FILM COATED ORAL NIGHTLY
COMMUNITY
End: 2023-05-22 | Stop reason: SDUPTHER

## 2023-05-22 RX ORDER — ATORVASTATIN CALCIUM 40 MG/1
40 TABLET, FILM COATED ORAL NIGHTLY
Qty: 90 TABLET | Refills: 3 | Status: SHIPPED | OUTPATIENT
Start: 2023-05-22

## 2023-05-22 RX ORDER — PANCRELIPASE 36000; 180000; 114000 [USP'U]/1; [USP'U]/1; [USP'U]/1
2 CAPSULE, DELAYED RELEASE PELLETS ORAL 3 TIMES DAILY
COMMUNITY

## 2023-05-22 RX ORDER — TAMSULOSIN HYDROCHLORIDE 0.4 MG/1
0.4 CAPSULE ORAL
COMMUNITY

## 2023-05-22 ASSESSMENT — ENCOUNTER SYMPTOMS
SHORTNESS OF BREATH: 0
COUGH: 0
FOCAL WEAKNESS: 0
HEARTBURN: 1
NERVOUS/ANXIOUS: 0
PALPITATIONS: 0
WEIGHT LOSS: 0
HEADACHES: 1
CLAUDICATION: 0
FEVER: 0
ABDOMINAL PAIN: 1
WEAKNESS: 0
MEMORY LOSS: 1
DEPRESSION: 0
DIZZINESS: 0
CHILLS: 0
DOUBLE VISION: 0
SORE THROAT: 1
BLURRED VISION: 1
RESPIRATORY NEGATIVE: 1
MYALGIAS: 1
NAUSEA: 0
VOMITING: 0
BRUISES/BLEEDS EASILY: 0

## 2023-05-22 ASSESSMENT — FIBROSIS 4 INDEX: FIB4 SCORE: 1.21

## 2023-05-22 NOTE — PROGRESS NOTES
Chief Complaint   Patient presents with    Chest Pain     NP       Subjective     Yadiel Brando Moody is a 67 y.o. male who presents today for new patient establishment for.chest pain.     Mr. Moody is a 67 year old male with diastolic congestive heart failure, hypertension and hyperlipidemia. He was last seen in my office on 01/31/13. He was well until when he was diagnosed with necrotizing pancreatitis while visiting his grandchildren in [place], transferring to [place] and ultimately at [facility] in [place] in 10/09/22-03/19/23. Since then, he has been doing well clinically. He denies chest pain, shortness of breath, palpitations, nausea/vomiting or diaphoresis. He is able to walk.     Past Medical History:   Diagnosis Date    Allergic rhinitis     Anesthesia     slow to wake up; ?apneic, needs encourangement to breathe    Back pain     Chickenpox     [place] measles     High cholesterol     Hyperlipidemia     Hypertension     Influenza     Mumps     Nasal drainage     Obesity     Pulmonary hypertension (HCC)     Sleep apnea 2015    unable to use bipap right now due to sinus issue    Tonsillitis      Past Surgical History:   Procedure Laterality Date    UMBILICAL HERNIA REPAIR  2/23/2015    Performed by John H Ganser, M.D. at SURGERY [facility]    OTHER ORTHOPEDIC SURGERY  2009    Right Knee Repair    OTHER ORTHOPEDIC SURGERY  1988    Right Meniscus    [id number]      NASAL RECONSTRUCTION      NEPHRECTOMY LAPAROSCOPIC      TONSILLECTOMY       Family History   Problem Relation Age of Onset    Heart Attack Father     Heart Disease Father     Sleep Apnea Father      Social History     Socioeconomic History    Marital status:      Spouse name: Not on file    Number of children: Not on file    Years of education: Not on file    Highest education level: Not on file   Occupational History    Not on file   Tobacco Use    Smoking status: Never    Smokeless tobacco: Never   Vaping Use    Vaping Use:  Never used   Substance and Sexual Activity    Alcohol use: Not Currently     Comment: 0-1 glasses of wine a week    Drug use: No    Sexual activity: Not on file   Other Topics Concern    Not on file   Social History Narrative    Not on file     Social Determinants of Health     Financial Resource Strain: Not on file   Food Insecurity: Not on file   Transportation Needs: Not on file   Physical Activity: Not on file   Stress: Not on file   Social Connections: Not on file   Intimate Partner Violence: Not on file   Housing Stability: Not on file     Allergies   Allergen Reactions    Crestor [Rosuvastatin Calcium]      Severe side effects     (Medications reviewed.)   Outpatient Encounter Medications as of 5/22/2023   Medication Sig Dispense Refill    metoprolol tartrate (LOPRESSOR) 25 MG Tab Take 25 mg by mouth 2 times a day.      tamsulosin (FLOMAX) 0.4 MG capsule Take 0.4 mg by mouth 1/2 hour after breakfast.      Pancrelipase, Lip-Prot-Amyl, (CREON) 96750-817377 units Cap DR Particles Take  by mouth.      atorvastatin (LIPITOR) 40 MG Tab Take 40 mg by mouth every evening.      diphenhydrAMINE HCl (ALLERGY MED PO) Take  by mouth.      omeprazole (PRILOSEC) 20 MG delayed-release capsule Take 20 mg by mouth every day.      [DISCONTINUED] omega-3 acid ethyl esters (LOVAZA) 1 GM capsule Take 2 g by mouth 2 times a day. (Patient not taking: Reported on 5/22/2023)      [DISCONTINUED] lisinopril (PRINIVIL) 40 MG tablet Take 40 mg by mouth every day. (Patient not taking: Reported on 5/22/2023)      [DISCONTINUED] galantamine (RAZADYNE ER) 24 MG ER capsule 28 mg. (Patient not taking: Reported on 5/22/2023)      [DISCONTINUED] sertraline (ZOLOFT) 100 MG Tab Take 1 Tablet by mouth 2 times a day. (Patient not taking: Reported on 5/22/2023)      [DISCONTINUED] cetirizine (ZYRTEC) 10 MG Tab as needed. (Patient not taking: Reported on 5/22/2023)      [DISCONTINUED] Memantine HCl ER (NAMENDA) 28 MG CAPSULE SR 24 HR TAKE 1 CAPSULE BY  MOUTH EVERY DAY (Patient not taking: Reported on 5/22/2023) 30 Capsule 5    [DISCONTINUED] magnesium citrate Solution Take 300 mL by mouth one time. (Patient not taking: Reported on 5/22/2023)      [DISCONTINUED] Potassium (POTASSIMIN PO) Take  by mouth. Takes daily (Patient not taking: Reported on 5/22/2023)      [DISCONTINUED] fluticasone (FLONASE) 50 MCG/ACT nasal spray  (Patient not taking: Reported on 5/22/2023)      [DISCONTINUED] therapeutic multivitamin-minerals (THERAGRAN-M) Tab Take 1 Tab by mouth every day. (Patient not taking: Reported on 5/22/2023)      [DISCONTINUED] aspirin (ASA) 325 MG Tab Take 81 mg by mouth every 6 hours as needed. (Patient not taking: Reported on 5/22/2023)      [DISCONTINUED] ascorbic acid (ASCORBIC ACID) 500 MG Tab Take 500 mg by mouth every day. (Patient not taking: Reported on 5/22/2023)      [DISCONTINUED] ezetimibe (ZETIA) 10 MG Tab TK 1 T PO QD (Patient not taking: Reported on 5/22/2023)  5    [DISCONTINUED] vitamin D (CHOLECALCIFEROL) 1000 UNIT Tab Take 4,000 Units by mouth every day. (Patient not taking: Reported on 5/22/2023)       No facility-administered encounter medications on file as of 5/22/2023.     Review of Systems   Constitutional:  Positive for malaise/fatigue. Negative for chills, fever and weight loss.   HENT:  Positive for congestion, sore throat and tinnitus. Negative for hearing loss.    Eyes:  Positive for blurred vision. Negative for double vision.   Respiratory: Negative.  Negative for cough and shortness of breath.    Cardiovascular:  Positive for leg swelling. Negative for chest pain, palpitations and claudication.   Gastrointestinal:  Positive for abdominal pain and heartburn. Negative for nausea and vomiting.   Genitourinary:  Positive for frequency. Negative for dysuria and urgency.   Musculoskeletal:  Positive for joint pain and myalgias.   Skin:  Positive for itching. Negative for rash.   Neurological:  Positive for headaches. Negative for  "dizziness, focal weakness and weakness.   Endo/Heme/Allergies:  Positive for environmental allergies. Does not bruise/bleed easily.   Psychiatric/Behavioral:  Positive for memory loss. Negative for depression. The patient is not nervous/anxious.               Objective     /62 (BP Location: Left arm, Patient Position: Sitting, BP Cuff Size: Adult)   Pulse 86   Resp 16   Ht 1.753 m (5' 9\")   Wt 95.3 kg (210 lb)   SpO2 93%   BMI 31.01 kg/m²     Physical Exam  Constitutional:       Appearance: Normal appearance. He is well-developed and normal weight.   HENT:      Head: Normocephalic and atraumatic.   Neck:      Vascular: No JVD.   Cardiovascular:      Rate and Rhythm: Normal rate and regular rhythm.      Heart sounds: Normal heart sounds.   Pulmonary:      Effort: Pulmonary effort is normal.      Breath sounds: Normal breath sounds.   Abdominal:      General: Bowel sounds are normal.      Palpations: Abdomen is soft.      Comments: No hepatosplenomegaly.   Musculoskeletal:         General: Normal range of motion.   Lymphadenopathy:      Cervical: No cervical adenopathy.   Skin:     General: Skin is warm and dry.   Neurological:      Mental Status: He is alert and oriented to person, place, and time.            CARDIAC STUDIES/PROCEDURES:    CT OF ABDOMEN [facility](03/08/23)  Vasculature: Non-aneurysmal abdominal aorta.   (study result reviewed)      ECHOCARDIOGRAM CONCLUSIONS (01/19/13)  Diastolic dysfunction. Otherwise, structurally normal heart by surface echocardiogram.   Ejection fraction of 60-65%.     EKG performed on (12/28/12) EKG shows normal sinus rhythm with early R/S transition and Q waves of lead III.     Laboratory results of 01/18/13 were reviewed. Cholesterol profile of 202/276/41/106 noted.     MPI CONCLUSIONS (01/19/13)  1. No fixed or reversible left ventricular perfusion defects.  2. Normal LV wall motion. Ejection fraction is 62%.     Assessment & Plan     1. Other chest " pain  EKG      2. Primary hypertension        3. Pure hypercholesterolemia            Medical Decision Making: Today's Assessment/Status/Plan:        Chest pain with unspecified myocardial infarction in 11/2022 at [facility] in [place]: He is clinically doing well. We will continue with metoprolol, atorvastatin. We obtained records, which he would like reviewed.   Hypertension: Blood pressure is well controlled. We will continue with beta blockade therapy.  Hyperlipidemia: He is doing well on statin therapy without myalgia symptoms.  Addendum: He will establish with a primary care physician.   Greater than 45 minutes of time was spent to review all above information; of which more than 50% of the time was face to face reviewing thee patient's medical issues, medication evaluation, study result review, lab results review.    We will follow up in 3 months.

## 2023-05-23 LAB — EKG IMPRESSION: NORMAL

## 2023-05-23 PROCEDURE — 93010 ELECTROCARDIOGRAM REPORT: CPT | Performed by: INTERNAL MEDICINE

## 2023-08-17 ENCOUNTER — TELEPHONE (OUTPATIENT)
Dept: CARDIOLOGY | Facility: MEDICAL CENTER | Age: 68
End: 2023-08-17

## 2023-08-17 ENCOUNTER — HOSPITAL ENCOUNTER (OUTPATIENT)
Dept: CARDIOLOGY | Facility: MEDICAL CENTER | Age: 68
End: 2023-08-17
Attending: INTERNAL MEDICINE
Payer: MEDICARE

## 2023-08-17 VITALS
HEIGHT: 69 IN | BODY MASS INDEX: 32.73 KG/M2 | HEART RATE: 62 BPM | OXYGEN SATURATION: 96 % | SYSTOLIC BLOOD PRESSURE: 124 MMHG | WEIGHT: 221 LBS | DIASTOLIC BLOOD PRESSURE: 72 MMHG | RESPIRATION RATE: 16 BRPM

## 2023-08-17 DIAGNOSIS — I25.5 ISCHEMIC CARDIOMYOPATHY: ICD-10-CM

## 2023-08-17 DIAGNOSIS — I25.118 CORONARY ARTERY DISEASE OF NATIVE ARTERY OF NATIVE HEART WITH STABLE ANGINA PECTORIS (HCC): ICD-10-CM

## 2023-08-17 DIAGNOSIS — I10 PRIMARY HYPERTENSION: ICD-10-CM

## 2023-08-17 DIAGNOSIS — I20.0 UNSTABLE ANGINA (HCC): ICD-10-CM

## 2023-08-17 DIAGNOSIS — I21.4 NSTEMI (NON-ST ELEVATED MYOCARDIAL INFARCTION) (HCC): ICD-10-CM

## 2023-08-17 DIAGNOSIS — E78.00 PURE HYPERCHOLESTEROLEMIA: ICD-10-CM

## 2023-08-17 PROCEDURE — 93306 TTE W/DOPPLER COMPLETE: CPT

## 2023-08-17 PROCEDURE — 3074F SYST BP LT 130 MM HG: CPT | Performed by: INTERNAL MEDICINE

## 2023-08-17 PROCEDURE — 99215 OFFICE O/P EST HI 40 MIN: CPT | Performed by: INTERNAL MEDICINE

## 2023-08-17 PROCEDURE — 3078F DIAST BP <80 MM HG: CPT | Performed by: INTERNAL MEDICINE

## 2023-08-17 PROCEDURE — 700117 HCHG RX CONTRAST REV CODE 255: Performed by: INTERNAL MEDICINE

## 2023-08-17 PROCEDURE — 99212 OFFICE O/P EST SF 10 MIN: CPT | Mod: 25 | Performed by: INTERNAL MEDICINE

## 2023-08-17 RX ADMIN — HUMAN ALBUMIN MICROSPHERES AND PERFLUTREN 3 ML: 10; .22 INJECTION, SOLUTION INTRAVENOUS at 15:05

## 2023-08-17 ASSESSMENT — ENCOUNTER SYMPTOMS
WEIGHT LOSS: 0
NAUSEA: 0
DOUBLE VISION: 0
WEAKNESS: 0
ABDOMINAL PAIN: 0
MYALGIAS: 0
FEVER: 0
GASTROINTESTINAL NEGATIVE: 1
HEADACHES: 0
COUGH: 0
CHILLS: 0
BRUISES/BLEEDS EASILY: 0
EYES NEGATIVE: 1
MUSCULOSKELETAL NEGATIVE: 1
PALPITATIONS: 0
PSYCHIATRIC NEGATIVE: 1
RESPIRATORY NEGATIVE: 1
FOCAL WEAKNESS: 0
DEPRESSION: 0
CONSTITUTIONAL NEGATIVE: 1
CLAUDICATION: 0
DIZZINESS: 0
SHORTNESS OF BREATH: 0
BLURRED VISION: 0
NEUROLOGICAL NEGATIVE: 1
VOMITING: 0
NERVOUS/ANXIOUS: 0

## 2023-08-17 ASSESSMENT — FIBROSIS 4 INDEX: FIB4 SCORE: 1.21

## 2023-08-17 NOTE — TELEPHONE ENCOUNTER
Ben Albarran,      Patient was informed you will be reaching out to schedule: CL-LEFT HEART CATHETERIZATION WITH POSSIBLE INTERVENTION. Ordered per DANIELLE, Thank you.

## 2023-08-17 NOTE — TELEPHONE ENCOUNTER
Requested records from [facility]    Pending records  Records were requested; for most recent lab results per DANIELLE.  Spoke with Gene in order to complete records request.  
all other ROS negative except as per HPI

## 2023-08-17 NOTE — TELEPHONE ENCOUNTER
Patient is scheduled on 8-22-23 for a C w/poss with Dr.Jad Sawyer. No meds to stop and patient to check in at 6:00 for a 7:30 procedure. H&P was done on 8-17-23 by . Pre admit to call patient.

## 2023-08-17 NOTE — PROGRESS NOTES
Chief Complaint   Patient presents with    Chest Pain    Shortness of Breath    Hypertension       Subjective     Yadiel Moody is a 67 y.o. male who presents today for follow up of chest pain.    Since the patient's last visit on 05/22/23, he has continued to experience chest pain once or twice a week. He describes his chest pain to be 1-2/10 chest dull ache sensation of his left chest with down his left arm lasting up to 5 minutes. He denies associated shortness of breath, palpitations, diaphoresis, nausea and vomiting. His pain is aggravated by exertion and is alleviated by rest.     Past Medical History:   Diagnosis Date    Allergic rhinitis     Anesthesia     slow to wake up; ?apneic, needs encourangement to breathe    Back pain     Chickenpox     [place] measles     High cholesterol     Hyperlipidemia     Hypertension     Influenza     Mumps     Nasal drainage     Obesity     Pulmonary hypertension (HCC)     Sleep apnea 2015    unable to use bipap right now due to sinus issue    Tonsillitis      Past Surgical History:   Procedure Laterality Date    UMBILICAL HERNIA REPAIR  2/23/2015    Performed by John H Ganser, M.D. at SURGERY [facility]    OTHER ORTHOPEDIC SURGERY  2009    Right Knee Repair    OTHER ORTHOPEDIC SURGERY  1988    Right Meniscus    [id number]      NASAL RECONSTRUCTION      NEPHRECTOMY LAPAROSCOPIC      TONSILLECTOMY       Family History   Problem Relation Age of Onset    Heart Attack Father     Heart Disease Father     Sleep Apnea Father      Social History     Socioeconomic History    Marital status:      Spouse name: Not on file    Number of children: Not on file    Years of education: Not on file    Highest education level: Not on file   Occupational History    Not on file   Tobacco Use    Smoking status: Never    Smokeless tobacco: Never   Vaping Use    Vaping Use: Never used   Substance and Sexual Activity    Alcohol use: Not Currently     Comment: 0-1 glasses of wine a week     Drug use: No    Sexual activity: Not on file   Other Topics Concern    Not on file   Social History Narrative    Not on file     Social Determinants of Health     Financial Resource Strain: Not on file   Food Insecurity: Not on file   Transportation Needs: Not on file   Physical Activity: Not on file   Stress: Not on file   Social Connections: Not on file   Intimate Partner Violence: Not on file   Housing Stability: Not on file     Allergies   Allergen Reactions    Crestor [Rosuvastatin Calcium]      Severe side effects     (Medications reviewed.)   Outpatient Encounter Medications as of 8/17/2023   Medication Sig Dispense Refill    tamsulosin (FLOMAX) 0.4 MG capsule Take 0.4 mg by mouth 1/2 hour after breakfast.      Pancrelipase, Lip-Prot-Amyl, (CREON) 56289-500600 units Cap DR Particles Take  by mouth.      diphenhydrAMINE HCl (ALLERGY MED PO) Take  by mouth.      metoprolol tartrate (LOPRESSOR) 25 MG Tab Take 1 Tablet by mouth 2 times a day. 180 Tablet 3    atorvastatin (LIPITOR) 40 MG Tab Take 1 Tablet by mouth every evening. 90 Tablet 3    omeprazole (PRILOSEC) 20 MG delayed-release capsule Take 20 mg by mouth every day.       No facility-administered encounter medications on file as of 8/17/2023.     Review of Systems   Constitutional: Negative.  Negative for chills, fever, malaise/fatigue and weight loss.   HENT: Negative.  Negative for hearing loss.    Eyes: Negative.  Negative for blurred vision and double vision.   Respiratory: Negative.  Negative for cough and shortness of breath.    Cardiovascular:  Positive for chest pain. Negative for palpitations, claudication and leg swelling.   Gastrointestinal: Negative.  Negative for abdominal pain, nausea and vomiting.   Genitourinary: Negative.  Negative for dysuria and urgency.   Musculoskeletal: Negative.  Negative for joint pain and myalgias.   Skin: Negative.  Negative for itching and rash.   Neurological: Negative.  Negative for dizziness, focal  "weakness, weakness and headaches.   Endo/Heme/Allergies: Negative.  Does not bruise/bleed easily.   Psychiatric/Behavioral: Negative.  Negative for depression. The patient is not nervous/anxious.               Objective     /72 (BP Location: Left arm, Patient Position: Sitting, BP Cuff Size: Adult)   Pulse 62   Resp 16   Ht 1.753 m (5' 9\")   Wt 100 kg (221 lb)   SpO2 96%   BMI 32.64 kg/m²     Physical Exam  Constitutional:       Appearance: Normal appearance. He is well-developed and normal weight.   HENT:      Head: Normocephalic and atraumatic.   Neck:      Vascular: No JVD.   Cardiovascular:      Rate and Rhythm: Normal rate and regular rhythm.      Heart sounds: Normal heart sounds.   Pulmonary:      Effort: Pulmonary effort is normal.      Breath sounds: Normal breath sounds.   Abdominal:      General: Bowel sounds are normal.      Palpations: Abdomen is soft.      Comments: No hepatosplenomegaly.   Musculoskeletal:         General: Normal range of motion.   Lymphadenopathy:      Cervical: No cervical adenopathy.   Skin:     General: Skin is warm and dry.   Neurological:      Mental Status: He is alert and oriented to person, place, and time.            CARDIAC STUDIES/PROCEDURES:    CARDIAC CATHETERIZATION CONCLUSIONS [facility] [place] (11/11/22)  Cardiac catheterization showing severe 3 vessel coronary artery disease.  (study result reviewed)      CT OF ABDOMEN [facility](03/08/23)  Vasculature: Non-aneurysmal abdominal aorta.     ECHOCARDIOGRAM CONCLUSIONS [facility] [place] (11/10/22)  Echocardiogram showing ejection fraction of 35-40%..  (study result reviewed)     ECHOCARDIOGRAM CONCLUSIONS (01/19/13)  Diastolic dysfunction. Otherwise, structurally normal heart by surface echocardiogram.   Ejection fraction of 60-65%.     EKG performed on (05/22/23) was reviewed: EKG personally interpreted shows sinus rhythm.   EKG performed on (12/28/12) EKG " shows normal sinus rhythm with early R/S transition and Q waves of lead III.     Laboratory results of (10/28/20) were reviewed. Cholesterol profile of 246/347/38/139 mg/dL noted.   Laboratory results of (01/18/13): Cholesterol profile of 202/276/41/106 noted.     MPI CONCLUSIONS (01/19/13)  1. No fixed or reversible left ventricular perfusion defects.  2. Normal LV wall motion. Ejection fraction is 62%.    Assessment & Plan     1. Unstable angina (HCC)  CL-LEFT HEART CATHETERIZATION WITH POSSIBLE INTERVENTION    EC-ECHOCARDIOGRAM COMPLETE W/O CONT      2. NSTEMI (non-ST elevated myocardial infarction) (HCC)  CL-LEFT HEART CATHETERIZATION WITH POSSIBLE INTERVENTION    EC-ECHOCARDIOGRAM COMPLETE W/O CONT      3. Coronary artery disease of native artery of native heart with stable angina pectoris (HCC)  CL-LEFT HEART CATHETERIZATION WITH POSSIBLE INTERVENTION    EC-ECHOCARDIOGRAM COMPLETE W/O CONT      4. Ischemic cardiomyopathy  CL-LEFT HEART CATHETERIZATION WITH POSSIBLE INTERVENTION    EC-ECHOCARDIOGRAM COMPLETE W/O CONT      5. Primary hypertension        6. 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 We will reschedule cardiac catheterization as it has been almost a year since his event.  He understands the risks and benefits and agrees with plan.    Ischemic cardiomyopathy: The overall volume status is adequate. We will continue with current care. We will repeat an echocardiogram.   Hypertension: Blood pressure is well controlled. We will continue with metoprolol.  Hyperlipidemia: He is doing well on statin therapy without myalgia symptoms.   Health maintenance: He was diagnosed with necrotizing pancreatitis while visiting his grandchildren in [place], transferring to [place] and  ultimately at [facility] in [place] in 10/09/22-03/19/23. (Managed by [facility])   Additional information: He and his wife lives in [place], [place].  The risks, benefits, and alternatives to coronary angiography with IV sedation were discussed in great detail. Specific risks mentioned include bleeding, infection, kidney damage, allergic reaction, cardiac perforation with possible tamponade requiring maninder-cardiocentesis or possible open heart surgery. In addition, we discussed that 10% of patients will experience small to moderate bruising at the side of the arterial puncture. Lastly the risks of heart attack, stroke, and death were discussed; the risks of major complications such as heart attack or stroke caused by the angiogram is less than 1%; the risk of death is approximately 1 in 1000. The patient verbalized understanding of these potential complications and wishes to proceed with this procedure.    We will follow up in 3 months.    CC Trent Wilson

## 2023-08-18 ENCOUNTER — APPOINTMENT (OUTPATIENT)
Dept: ADMISSIONS | Facility: MEDICAL CENTER | Age: 68
End: 2023-08-18
Attending: INTERNAL MEDICINE
Payer: MEDICARE

## 2023-08-18 LAB
LV EJECT FRACT  99904: 65
LV EJECT FRACT MOD 2C 99903: 70.64
LV EJECT FRACT MOD 4C 99902: 70.59
LV EJECT FRACT MOD BP 99901: 70.95

## 2023-08-18 PROCEDURE — 93306 TTE W/DOPPLER COMPLETE: CPT | Mod: 26 | Performed by: INTERNAL MEDICINE

## 2023-08-21 ENCOUNTER — PRE-ADMISSION TESTING (OUTPATIENT)
Dept: ADMISSIONS | Facility: MEDICAL CENTER | Age: 68
End: 2023-08-21
Attending: INTERNAL MEDICINE
Payer: MEDICARE

## 2023-08-22 ENCOUNTER — HOSPITAL ENCOUNTER (OUTPATIENT)
Facility: MEDICAL CENTER | Age: 68
End: 2023-08-22
Attending: INTERNAL MEDICINE | Admitting: INTERNAL MEDICINE
Payer: MEDICARE

## 2023-08-22 ENCOUNTER — APPOINTMENT (OUTPATIENT)
Dept: CARDIOLOGY | Facility: MEDICAL CENTER | Age: 68
End: 2023-08-22
Attending: INTERNAL MEDICINE
Payer: MEDICARE

## 2023-08-22 VITALS
HEART RATE: 75 BPM | TEMPERATURE: 97.4 F | BODY MASS INDEX: 32.13 KG/M2 | SYSTOLIC BLOOD PRESSURE: 117 MMHG | RESPIRATION RATE: 18 BRPM | DIASTOLIC BLOOD PRESSURE: 71 MMHG | OXYGEN SATURATION: 100 % | WEIGHT: 216.93 LBS | HEIGHT: 69 IN

## 2023-08-22 DIAGNOSIS — I25.118 CORONARY ARTERY DISEASE OF NATIVE ARTERY OF NATIVE HEART WITH STABLE ANGINA PECTORIS (HCC): ICD-10-CM

## 2023-08-22 DIAGNOSIS — I25.5 ISCHEMIC CARDIOMYOPATHY: ICD-10-CM

## 2023-08-22 DIAGNOSIS — I20.0 UNSTABLE ANGINA (HCC): ICD-10-CM

## 2023-08-22 DIAGNOSIS — I21.4 NSTEMI (NON-ST ELEVATED MYOCARDIAL INFARCTION) (HCC): ICD-10-CM

## 2023-08-22 LAB
ALBUMIN SERPL BCP-MCNC: 4.4 G/DL (ref 3.2–4.9)
ALBUMIN/GLOB SERPL: 1.6 G/DL
ALP SERPL-CCNC: 224 U/L (ref 30–99)
ALT SERPL-CCNC: 21 U/L (ref 2–50)
ANION GAP SERPL CALC-SCNC: 13 MMOL/L (ref 7–16)
APTT PPP: 30.3 SEC (ref 24.7–36)
AST SERPL-CCNC: 19 U/L (ref 12–45)
BILIRUB SERPL-MCNC: 0.4 MG/DL (ref 0.1–1.5)
BUN SERPL-MCNC: 20 MG/DL (ref 8–22)
CALCIUM ALBUM COR SERPL-MCNC: 8.8 MG/DL (ref 8.5–10.5)
CALCIUM SERPL-MCNC: 9.1 MG/DL (ref 8.5–10.5)
CHLORIDE SERPL-SCNC: 103 MMOL/L (ref 96–112)
CO2 SERPL-SCNC: 21 MMOL/L (ref 20–33)
CREAT SERPL-MCNC: 1.11 MG/DL (ref 0.5–1.4)
EKG IMPRESSION: NORMAL
ERYTHROCYTE [DISTWIDTH] IN BLOOD BY AUTOMATED COUNT: 40.1 FL (ref 35.9–50)
GFR SERPLBLD CREATININE-BSD FMLA CKD-EPI: 73 ML/MIN/1.73 M 2
GLOBULIN SER CALC-MCNC: 2.7 G/DL (ref 1.9–3.5)
GLUCOSE SERPL-MCNC: 400 MG/DL (ref 65–99)
HCT VFR BLD AUTO: 44.7 % (ref 42–52)
HGB BLD-MCNC: 16 G/DL (ref 14–18)
INR PPP: 0.96 (ref 0.87–1.13)
MCH RBC QN AUTO: 30.8 PG (ref 27–33)
MCHC RBC AUTO-ENTMCNC: 35.8 G/DL (ref 32.3–36.5)
MCV RBC AUTO: 86 FL (ref 81.4–97.8)
PLATELET # BLD AUTO: 177 K/UL (ref 164–446)
PMV BLD AUTO: 10.4 FL (ref 9–12.9)
POTASSIUM SERPL-SCNC: 4.3 MMOL/L (ref 3.6–5.5)
PROT SERPL-MCNC: 7.1 G/DL (ref 6–8.2)
PROTHROMBIN TIME: 12.7 SEC (ref 12–14.6)
RBC # BLD AUTO: 5.2 M/UL (ref 4.7–6.1)
SODIUM SERPL-SCNC: 137 MMOL/L (ref 135–145)
WBC # BLD AUTO: 5.4 K/UL (ref 4.8–10.8)

## 2023-08-22 PROCEDURE — 700101 HCHG RX REV CODE 250

## 2023-08-22 PROCEDURE — 85027 COMPLETE CBC AUTOMATED: CPT

## 2023-08-22 PROCEDURE — C1769 GUIDE WIRE: HCPCS

## 2023-08-22 PROCEDURE — 160035 HCHG PACU - 1ST 60 MINS PHASE I

## 2023-08-22 PROCEDURE — 85610 PROTHROMBIN TIME: CPT

## 2023-08-22 PROCEDURE — 160036 HCHG PACU - EA ADDL 30 MINS PHASE I

## 2023-08-22 PROCEDURE — 700105 HCHG RX REV CODE 258: Performed by: INTERNAL MEDICINE

## 2023-08-22 PROCEDURE — 93010 ELECTROCARDIOGRAM REPORT: CPT | Performed by: INTERNAL MEDICINE

## 2023-08-22 PROCEDURE — 160002 HCHG RECOVERY MINUTES (STAT)

## 2023-08-22 PROCEDURE — 93005 ELECTROCARDIOGRAM TRACING: CPT | Performed by: INTERNAL MEDICINE

## 2023-08-22 PROCEDURE — 700102 HCHG RX REV CODE 250 W/ 637 OVERRIDE(OP)

## 2023-08-22 PROCEDURE — 700117 HCHG RX CONTRAST REV CODE 255: Performed by: INTERNAL MEDICINE

## 2023-08-22 PROCEDURE — A9270 NON-COVERED ITEM OR SERVICE: HCPCS

## 2023-08-22 PROCEDURE — 80053 COMPREHEN METABOLIC PANEL: CPT

## 2023-08-22 PROCEDURE — 93458 L HRT ARTERY/VENTRICLE ANGIO: CPT | Mod: 26 | Performed by: INTERNAL MEDICINE

## 2023-08-22 PROCEDURE — 700111 HCHG RX REV CODE 636 W/ 250 OVERRIDE (IP)

## 2023-08-22 PROCEDURE — 160046 HCHG PACU - 1ST 60 MINS PHASE II

## 2023-08-22 PROCEDURE — 85730 THROMBOPLASTIN TIME PARTIAL: CPT

## 2023-08-22 PROCEDURE — 99152 MOD SED SAME PHYS/QHP 5/>YRS: CPT | Performed by: INTERNAL MEDICINE

## 2023-08-22 RX ORDER — MIDAZOLAM HYDROCHLORIDE 1 MG/ML
INJECTION INTRAMUSCULAR; INTRAVENOUS
Status: COMPLETED
Start: 2023-08-22 | End: 2023-08-22

## 2023-08-22 RX ORDER — ASPIRIN 81 MG/1
TABLET, CHEWABLE ORAL
Status: COMPLETED
Start: 2023-08-22 | End: 2023-08-22

## 2023-08-22 RX ORDER — HEPARIN SODIUM 1000 [USP'U]/ML
INJECTION, SOLUTION INTRAVENOUS; SUBCUTANEOUS
Status: COMPLETED
Start: 2023-08-22 | End: 2023-08-22

## 2023-08-22 RX ORDER — SODIUM CHLORIDE 9 MG/ML
1000 INJECTION, SOLUTION INTRAVENOUS
Status: COMPLETED | OUTPATIENT
Start: 2023-08-22 | End: 2023-08-22

## 2023-08-22 RX ORDER — VERAPAMIL HYDROCHLORIDE 2.5 MG/ML
INJECTION, SOLUTION INTRAVENOUS
Status: COMPLETED
Start: 2023-08-22 | End: 2023-08-22

## 2023-08-22 RX ORDER — LIDOCAINE HYDROCHLORIDE 20 MG/ML
INJECTION, SOLUTION INFILTRATION; PERINEURAL
Status: COMPLETED
Start: 2023-08-22 | End: 2023-08-22

## 2023-08-22 RX ORDER — ACETAMINOPHEN 500 MG
1000 TABLET ORAL
COMMUNITY

## 2023-08-22 RX ORDER — HEPARIN SODIUM 200 [USP'U]/100ML
INJECTION, SOLUTION INTRAVENOUS
Status: COMPLETED
Start: 2023-08-22 | End: 2023-08-22

## 2023-08-22 RX ADMIN — IOHEXOL 20 ML: 350 INJECTION, SOLUTION INTRAVENOUS at 08:17

## 2023-08-22 RX ADMIN — HEPARIN SODIUM 2000 UNITS: 200 INJECTION, SOLUTION INTRAVENOUS at 07:41

## 2023-08-22 RX ADMIN — MIDAZOLAM HYDROCHLORIDE 1 MG: 1 INJECTION, SOLUTION INTRAMUSCULAR; INTRAVENOUS at 08:17

## 2023-08-22 RX ADMIN — NITROGLYCERIN 10 ML: 20 INJECTION INTRAVENOUS at 07:40

## 2023-08-22 RX ADMIN — SODIUM CHLORIDE 1000 ML: 9 INJECTION, SOLUTION INTRAVENOUS at 07:15

## 2023-08-22 RX ADMIN — LIDOCAINE HYDROCHLORIDE: 20 INJECTION, SOLUTION INFILTRATION; PERINEURAL at 07:40

## 2023-08-22 RX ADMIN — HEPARIN SODIUM: 1000 INJECTION, SOLUTION INTRAVENOUS; SUBCUTANEOUS at 07:40

## 2023-08-22 RX ADMIN — ASPIRIN 81 MG 324 MG: 81 TABLET ORAL at 08:08

## 2023-08-22 RX ADMIN — FENTANYL CITRATE 50 MCG: 50 INJECTION, SOLUTION INTRAMUSCULAR; INTRAVENOUS at 08:17

## 2023-08-22 RX ADMIN — VERAPAMIL HYDROCHLORIDE 2.5 MG: 2.5 INJECTION, SOLUTION INTRAVENOUS at 07:40

## 2023-08-22 ASSESSMENT — FIBROSIS 4 INDEX
FIB4 SCORE: 1.21
FIB4 SCORE: 1.21

## 2023-08-22 NOTE — OR NURSING
Arrived from PACU AXO, Pt's VSS; denies N/V; states pain is at tolerable level. Dressing CDI to right radial.     D/c orders received. IV dc'd. Pt changed into clothing with assistance. Discharge reviewed, Pt and family verbalized understanding and questions answered. Patient states ready to d/c home. Pt dc'd in w/c with wife.

## 2023-08-22 NOTE — OR NURSING
Pt arrives from Cath Lab to PACU at 0826. Pt identification verified by team, pt placed on all monitors with alarms audible, report and care of pt received from RN. Assessment completed, pt changed into hospital gown and provided with warm blankets.

## 2023-08-22 NOTE — PROCEDURES
Cardiac Catheterization Laboratory Procedure Note    DATE: 8/22/2023    : Vazquez Sawyer MD, MPH    PROCEDURES PERFORMED:  Left heart catheterization  Coronary angiography  Moderate conscious sedation    INDICATIONS:  Unstable angina    CONSENT:  The complete alternatives, risks, and benefits of the procedure were explained to the patient. Informed consent was obtained prior to the procedure.    MEDICATIONS:  Lidocaine  Fentanyl  Midazolam  Nitroglycerin  Verapamil  Heparin    MODERATE CONSCIOUS SEDATION:  I personally supervised the administration of moderate conscious sedation by the nursing staff for 15 minutes.  Start time: 8:01 AM  End time: 8:16 AM    CONTRAST: Omnipaque 20 cc    RADIATION DOSE (Air Kerma): 270 mGy    FLUOROSCOPY TIME: 3.3 minutes    ACCESS:  6-Tongan Glidesheath in the right radial artery    ESTIMATED BLOOD LOSS: <10 cc    COMPLICATIONS: None    PROCEDURE IN DETAIL:  The patient was brought to the cardiac catheterization laboratory in the fasting state. The skin over the right wrist was prepped and draped in the usual sterile fashion. Lidocaine infiltration was used to anesthetize the tissue over the right radial artery. Using the micropuncture technique, a 6-Tongan Glidesheath was inserted in the right radial artery. A 5-Fr Terumo Tiger diagnostic catheter was then advanced over a standard J-wire into the left ventricular cavity where it was gently aspirated, flushed, and then withdrawn across the aortic valve with sequential pressures measured. This catheter was then used to engage the ostium of the right coronary artery and cineangiogram was obtained of the right coronary system. This catheter was then used to engage the ostium of the left coronary artery and cineangiograms were obtained in multiple projections for complete evaluation of the left coronary system. Following completion of coronary angiography, all wires, catheters, and sheaths were removed.  A TR band was placed over  the right wrist using the patent hemostasis technique.     HEMODYNAMICS:  Aortic pressure: 96 /60/74 mmHg  LVEDP: 9 mmHg  Mild transaortic gradient noted on pullback    CORONARY ANGIOGRAPHY:  The left main coronary artery : Large-caliber vessel with nonobstructive CAD, bifurcates to LAD and left circumflex.  The left anterior descending coronary artery :  Large-caliber transapical vessel with severe 80-90% mid stenosis and provides left to left collaterals to the occluded left circumflex and left-to-right collaterals to the occluded RCA.  Gives rise to several small-medium caliber diagonal branches  The left circumflex coronary artery : Presumably large in caliber vessel with  at its ostium with left to left collaterals  The right coronary artery  : Large-caliber dominant vessel with proximal , left-to-right collaterals.    IMPRESSION:  1.  Severe multivessel CAD  2.  Normal resting LVEDP 9 mmHg with mild transaortic gradient on pullback < 20mmHg    RECOMMENDATIONS:  Appreciate CT surgical consultation for CABG consideration  Start aspirin 81 mg daily  Ongoing optimal antianginal therapy  Cardiac rehab post revascularization  Ongoing secondary ASCVD preventive measures    NOTIFICATION:  The patient's spouse was called and notified of the results.    Referring Cardiologist - Dr. Lockett - was notified.    CT surgical team on call notified to help set up an outpatient consultation soon for this patient.    Vazquez Sawyer MD, MPH [facility]  Interventional Cardiologist  [facility]   of Clinical Internal Medicine - [facility]

## 2023-08-22 NOTE — DISCHARGE INSTRUCTIONS
HOME CARE INSTRUCTIONS    ACTIVITY: Rest and take it easy for the first 24 hours.  A responsible adult is recommended to remain with you during that time.  It is normal to feel sleepy.  We encourage you to not do anything that requires balance, judgment or coordination.    FOR 24 HOURS DO NOT:  Drive, operate machinery or run household appliances.  Drink beer or alcoholic beverages.  Make important decisions or sign legal documents.    SPECIAL INSTRUCTIONS: POST ANGIOGRAM  General Care Instructions  Maintain a bandage over the incision site for 24 hours.  It's normal to find a small bruise or dime-sized lump at the insertion site. This should disappear within a few weeks.  Do not apply lotions or powders to the site.  Do not immerse the catheter insertion site in water (bathtub/swimming) for five days. It is ok to shower 24 hours after the procedure.  You may resume your normal diet immediately; on the day of your procedure, drink 6-10 glasses of water to help flush the contrast liquid out of your system.  If the doctor inserted the catheter in your arm:  For 48 hours, DO NOT lift anything heavier than 10 pounds (approximately a gallon of milk). DO NOT do any heavy pushing, pulling, or twisting.    Medications  If your current medications need to be changed, you will be provided with an updated list of your medications prior to discharge.  If you take warfarin (Coumadin), resume taking your usual dose the evening after the procedure.  DO NOT STOP taking prescribed blood thinning (anti-platelet) medications unless instructed by your cardiologist.  These medications include:  Aspirin, Clopidogrel (Plavix), Ticagrelor (Brilinta), or Prasugrel (Effient)   If you take one of the following anticoagulants, RESUME 24 HOURS after your procedure:  Apixiban (Eliquis), Rivaroxaban (Xarelto), Dabigatran (Pradaxa), Edoxaban (Savaysa)  If you take metformin (Glucophage), RESUME 48 HOURS after your procedure.    DIET: To avoid  nausea, slowly advance diet as tolerated, avoiding spicy or greasy foods for the first day.  Add more substantial food to your diet according to your physician's instructions.  Babies can be fed formula or breast milk as soon as they are hungry.  INCREASE FLUIDS AND FIBER TO AVOID CONSTIPATION.    SURGICAL DRESSING/BATHING: may remove dressing in 24 hours and then shower; limit wrist movement for 48 hours    MEDICATIONS: Resume taking daily medication.  Take prescribed pain medication with food.  If no medication is prescribed, you may take non-aspirin pain medication if needed.  PAIN MEDICATION CAN BE VERY CONSTIPATING.  Take a stool softener or laxative such as senokot, pericolace, or milk of magnesia if needed.    A follow-up appointment should be arranged with your doctor; call to schedule.    You should CALL YOUR PHYSICIAN if you develop:  Fever greater than 101 degrees F.  Pain not relieved by medication, or persistent nausea or vomiting.  Excessive bleeding (blood soaking through dressing) or unexpected drainage from the wound.  Extreme redness or swelling around the incision site, drainage of pus or foul smelling drainage.  Inability to urinate or empty your bladder within 8 hours.  Problems with breathing or chest pain.    You should call 911 if you develop problems with breathing or chest pain.  If you are unable to contact your doctor or surgical center, you should go to the nearest emergency room or urgent care center.  Physician's telephone #: [phone]    MILD FLU-LIKE SYMPTOMS ARE NORMAL.  YOU MAY EXPERIENCE GENERALIZED MUSCLE ACHES, THROAT IRRITATION, HEADACHE AND/OR SOME NAUSEA.    If any questions arise, call your doctor.  If your doctor is not available, please feel free to call the Surgical Center at [phone].  The Center is open Monday through Friday from 7AM to 7PM.      A registered nurse may call you a few days after your surgery to see how you are doing after your procedure.    You  may also receive a survey in the mail within the next two weeks and we ask that you take a few moments to complete the survey and return it to us.  Our goal is to provide you with very good care and we value your comments.     Depression / Suicide Risk    As you are discharged from this [facility] facility, it is important to learn how to keep safe from harming yourself.    Recognize the warning signs:  Abrupt changes in personality, positive or negative- including increase in energy   Giving away possessions  Change in eating patterns- significant weight changes-  positive or negative  Change in sleeping patterns- unable to sleep or sleeping all the time   Unwillingness or inability to communicate  Depression  Unusual sadness, discouragement and loneliness  Talk of wanting to die  Neglect of personal appearance   Rebelliousness- reckless behavior  Withdrawal from people/activities they love  Confusion- inability to concentrate     If you or a loved one observes any of these behaviors or has concerns about self-harm, here's what you can do:  Talk about it- your feelings and reasons for harming yourself  Remove any means that you might use to hurt yourself (examples: pills, rope, extension cords, firearm)  Get professional help from the community (Mental Health, Substance Abuse, psychological counseling)  Do not be alone:Call your Safe Contact- someone whom you trust who will be there for you.  Call your local CRISIS HOTLINE 077-4662 or 331-346-2816  Call your local Children's Mobile Crisis Response Team [place] (288) 665-0257 or www.[employer]xkap  Call the toll free National Suicide Prevention Hotlines   National Suicide Prevention Lifeline 316-330-MYLG (8784)  [place] Hope Line Network 800-SUICIDE (547-3812)    I acknowledge receipt and understanding of these Home Care instructions.

## 2023-08-25 NOTE — PROGRESS NOTES
REFERRING PHYSICIAN: Vazquez Sawyer MD.     CONSULTING PHYSICIAN: Lizet Mann MD     CHIEF COMPLAINT: Chest pain    HISTORY OF PRESENT ILLNESS: The patient is a 67 y.o. male with a past medical history of hypertension, hyperlipidemia, NSTEMI, pancreatitis (hospitalized for several months starting in Oct, still recuperating physically), obesity, NARCISO who presents to the office with exertional chest pain that started in May.  His pain is dull and radiates to his left arm and is relieved with rest after 5 minutes.  He denies other symptoms.  He was seen by his cardiologist and underwent a left heart cath which shows multivessel disease.    His wife is present for the entire consultation.      PAST MEDICAL HISTORY:   Active Ambulatory Problems     Diagnosis Date Noted    Unstable angina ([facility]) 12/28/2012    Hypertension 12/28/2012    Hyperlipidemia 12/28/2012    Shortness of breath 01/19/2013    Obesity 01/19/2013    Umbilical hernia 02/23/2015    Memory problem 11/10/2017    Spells of decreased attentiveness 11/10/2017    NARCISO (obstructive sleep apnea) 04/17/2019    Morbid obesity with BMI of 40.0-44.9, adult ([facility]) 06/26/2019    Pre-diabetes 10/28/2020    Major depressive disorder 01/20/2022    NSTEMI (non-ST elevated myocardial infarction) ([facility]) 08/17/2023    Coronary artery disease of native artery with stable angina pectoris ([facility]) 08/17/2023    Ischemic cardiomyopathy 08/17/2023     Resolved Ambulatory Problems     Diagnosis Date Noted    CHF (congestive heart failure) ([facility]) 01/18/2013     Past Medical History:   Diagnosis Date    Allergic rhinitis     Anesthesia     Back pain     Chickenpox     Polish measles     Heart burn     High cholesterol     Hx of lithotripsy     Influenza     Mumps     Myocardial infarct ([facility]) 10/2022    Nasal drainage     Pulmonary hypertension ([facility])     Sleep apnea 2015    Tonsillitis        PAST SURGICAL HISTORY:   Past Surgical History:   Procedure Laterality Date    UMBILICAL  HERNIA REPAIR  02/23/2015    Performed by John H Ganser, M.D. at SURGERY [facility]    OTHER ORTHOPEDIC SURGERY  2009    Right Knee Repair    OTHER ORTHOPEDIC SURGERY  1988    Right Meniscus    [id number]      NASAL RECONSTRUCTION      deviatum septum, sinus surgery around 1998,2013    TONSILLECTOMY       GASTROINTESTIONAL ENDOSCOPIC      multiple for pacreatities        ALLERGIES: No Known Allergies     CURRENT MEDICATIONS:   Current Outpatient Medications:     acetaminophen (TYLENOL) 500 MG Tab, Take 1,000 mg by mouth 1 time a day as needed for Mild Pain., Disp: , Rfl:     tamsulosin (FLOMAX) 0.4 MG capsule, Take 0.4 mg by mouth 1/2 hour after breakfast., Disp: , Rfl:     Pancrelipase, Lip-Prot-Amyl, (CREON) 98923-109201 units Cap DR Particles, Take 2 Tablets by mouth in the morning, at noon, and at bedtime., Disp: , Rfl:     metoprolol tartrate (LOPRESSOR) 25 MG Tab, Take 1 Tablet by mouth 2 times a day., Disp: 180 Tablet, Rfl: 3    atorvastatin (LIPITOR) 40 MG Tab, Take 1 Tablet by mouth every evening., Disp: 90 Tablet, Rfl: 3    omeprazole (PRILOSEC) 20 MG delayed-release capsule, Take 20 mg by mouth every day., Disp: , Rfl:     FAMILY HISTORY:   Family History   Problem Relation Age of Onset    Heart Attack Father     Heart Disease Father     Sleep Apnea Father         SOCIAL HISTORY:   Social History     Socioeconomic History    Marital status:      Spouse name: Not on file    Number of children: Not on file    Years of education: Not on file    Highest education level: Not on file   Occupational History    Not on file   Tobacco Use    Smoking status: Never    Smokeless tobacco: Never   Vaping Use    Vaping Use: Never used   Substance and Sexual Activity    Alcohol use: Not Currently     Comment: 0-1 glasses of wine a week    Drug use: No    Sexual activity: Not on file   Other Topics Concern    Not on file   Social History Narrative    Not on file     Social Determinants of Health     Financial  "Resource Strain: Not on file   Food Insecurity: Not on file   Transportation Needs: Not on file   Physical Activity: Not on file   Stress: Not on file   Social Connections: Not on file   Intimate Partner Violence: Not on file   Housing Stability: Not on file       REVIEW OF SYSTEMS:  Review of Systems   Constitutional:  Positive for malaise/fatigue. Negative for chills, fever and weight loss.   HENT:  Negative for ear pain, nosebleeds and tinnitus.    Eyes:  Negative for double vision, photophobia and pain.   Respiratory:  Negative for cough, hemoptysis and shortness of breath.    Cardiovascular:  Positive for chest pain. Negative for palpitations, orthopnea, leg swelling and PND.   Gastrointestinal:  Negative for abdominal pain, blood in stool, nausea and vomiting.   Genitourinary:  Negative for frequency, hematuria and urgency.   Musculoskeletal: Negative.    Skin:  Negative for rash.   Neurological:  Negative for dizziness, tremors, speech change, focal weakness, seizures and headaches.   Endo/Heme/Allergies:  Negative for polydipsia. Does not bruise/bleed easily.   Psychiatric/Behavioral:  Negative for hallucinations and memory loss.          PHYSICAL EXAMINATION:    /70 (BP Location: Left arm, Patient Position: Sitting, BP Cuff Size: Adult)   Pulse 61   Temp 36.8 °C (98.2 °F) (Temporal)   Ht 1.753 m (5' 9\")   Wt 98.7 kg (217 lb 8 oz)   SpO2 95%   BMI 32.12 kg/m²    Physical Exam  Vitals reviewed.   Constitutional:       General: He is not in acute distress.     Appearance: Normal appearance.   HENT:      Head: Normocephalic and atraumatic.      Right Ear: External ear normal.      Left Ear: External ear normal.      Nose: Nose normal. No congestion.   Eyes:      General: No scleral icterus.     Extraocular Movements: Extraocular movements intact.      Conjunctiva/sclera: Conjunctivae normal.   Cardiovascular:      Rate and Rhythm: Normal rate and regular rhythm.   Pulmonary:      Effort: Pulmonary " effort is normal. No respiratory distress.   Abdominal:      General: There is no distension.      Palpations: Abdomen is soft.   Musculoskeletal:         General: Normal range of motion.      Cervical back: Normal range of motion.   Skin:     General: Skin is warm and dry.      Coloration: Skin is not jaundiced.      Findings: No rash.   Neurological:      Mental Status: He is alert and oriented to person, place, and time.      Cranial Nerves: No cranial nerve deficit.   Psychiatric:         Mood and Affect: Mood normal.         Behavior: Behavior normal.             LABS REVIEWED:  Lab Results   Component Value Date/Time    SODIUM 137 08/22/2023 07:00 AM    POTASSIUM 4.3 08/22/2023 07:00 AM    CHLORIDE 103 08/22/2023 07:00 AM    CO2 21 08/22/2023 07:00 AM    GLUCOSE 400 (H) 08/22/2023 07:00 AM    BUN 20 08/22/2023 07:00 AM    CREATININE 1.11 08/22/2023 07:00 AM      Lab Results   Component Value Date/Time    PROTHROMBTM 12.7 08/22/2023 07:00 AM    INR 0.96 08/22/2023 07:00 AM      Lab Results   Component Value Date/Time    WBC 5.4 08/22/2023 07:00 AM    RBC 5.20 08/22/2023 07:00 AM    HEMOGLOBIN 16.0 08/22/2023 07:00 AM    HEMATOCRIT 44.7 08/22/2023 07:00 AM    MCV 86.0 08/22/2023 07:00 AM    MCH 30.8 08/22/2023 07:00 AM    MCHC 35.8 08/22/2023 07:00 AM    MPV 10.4 08/22/2023 07:00 AM    NEUTSPOLYS 57.40 07/07/2022 09:10 AM    LYMPHOCYTES 27.60 07/07/2022 09:10 AM    MONOCYTES 11.70 07/07/2022 09:10 AM    EOSINOPHILS 2.30 07/07/2022 09:10 AM    BASOPHILS 0.70 07/07/2022 09:10 AM        IMAGING REVIEWED AND INTERPRETED:    ECHOCARDIOGRAM   8/17/23 [facility] [place]  [place]IONS  Prior study on 1/19/23, compared to the report of the prior study,   there has been no significant change.   Normal left ventricular systolic function.  The left ventricular ejection fraction is visually estimated to be 65%.  No significant valvular abnormalities.        CARDIAC CATHETERIZATION   8/22/23 [facility] [place]  HEMODYNAMICS:  Aortic pressure: 96 /60/74  mmHg  LVEDP: 9 mmHg  Mild transaortic gradient noted on pullback     CORONARY ANGIOGRAPHY:  The left main coronary artery : Large-caliber vessel with nonobstructive CAD, bifurcates to LAD and left circumflex.  The left anterior descending coronary artery :  Large-caliber transapical vessel with severe 80-90% mid stenosis and provides left to left collaterals to the occluded left circumflex and left-to-right collaterals to the occluded RCA.  Gives rise to several small-medium caliber diagonal branches  The left circumflex coronary artery : Presumably large in caliber vessel with  at its ostium with left to left collaterals  The right coronary artery  : Large-caliber dominant vessel with proximal , left-to-right collaterals.     IMPRESSION:  1.  Severe multivessel CAD  2.  Normal resting LVEDP 9 mmHg with mild transaortic gradient on pullback < 20mmHg      IMPRESSION:  68 yo gentleman with known conditions of hypertension, hyperlipidemia, NSTEMI, pancreatitis (hospitalized for several months starting in Oct, still recuperating physically), obesity, NARCISO now with multivessel CAD including  RCA and Cx and stable angina.      PLAN:  I recommend CABG x 3 next available appointment.  The procedure, its risks, benefits, potential complications and alternative treatments were discussed with the patient in detail including the risks should he decide not to undergo my recommended treatment. All of his questions were answered to his satisfaction and he is willing to proceed with the operation. The risks include death, stroke, infection: to include a rare bacterial infection related to the use of the heart/lung machine, maninder-operative myocardial infarction, dysrhythmias, diaphragmatic paralysis, chest wall paresthesia, tracheostomy, kidney or other organ failure, possible return to the operating room for bleeding, bleeding requiring transfusion with its attendant risks including AIDS or hepatitis, dehiscence of surgical  incisions, respiratory complications including the need for prolonged ventilator support, Protamine or other drug reaction, peripheral neuropathy, loss of limb, and miscount of surgical items. The operative mortality risk is approximately 1-2%. The STS mortality risk score is 1% and the morbidity and mortality risk score is 5-10%. The scores were discussed with patient.    Thank you for this very challenging consultation and participation in the patient’s care.  I will keep you apprised of all future developments.      The operation is scheduled for Thursday September 14th at 7:30 AM at [facility].         Sincerely,       Lizet Mann MD.

## 2023-08-25 NOTE — H&P (VIEW-ONLY)
REFERRING PHYSICIAN: Vazquez Sawyer MD.     CONSULTING PHYSICIAN: Lizet Mann MD     CHIEF COMPLAINT: Chest pain    HISTORY OF PRESENT ILLNESS: The patient is a 67 y.o. male with a past medical history of hypertension, hyperlipidemia, NSTEMI, pancreatitis (hospitalized for several months starting in Oct, still recuperating physically), obesity, NARCISO who presents to the office with exertional chest pain that started in May.  His pain is dull and radiates to his left arm and is relieved with rest after 5 minutes.  He denies other symptoms.  He was seen by his cardiologist and underwent a left heart cath which shows multivessel disease.    His wife is present for the entire consultation.      PAST MEDICAL HISTORY:   Active Ambulatory Problems     Diagnosis Date Noted    Unstable angina (Formerly [facility]) 12/28/2012    Hypertension 12/28/2012    Hyperlipidemia 12/28/2012    Shortness of breath 01/19/2013    Obesity 01/19/2013    Umbilical hernia 02/23/2015    Memory problem 11/10/2017    Spells of decreased attentiveness 11/10/2017    NARCISO (obstructive sleep apnea) 04/17/2019    Morbid obesity with BMI of 40.0-44.9, adult (Formerly [facility]) 06/26/2019    Pre-diabetes 10/28/2020    Major depressive disorder 01/20/2022    NSTEMI (non-ST elevated myocardial infarction) (Formerly [facility]) 08/17/2023    Coronary artery disease of native artery with stable angina pectoris (Formerly [facility]) 08/17/2023    Ischemic cardiomyopathy 08/17/2023     Resolved Ambulatory Problems     Diagnosis Date Noted    CHF (congestive heart failure) ([facility]) 01/18/2013     Past Medical History:   Diagnosis Date    Allergic rhinitis     Anesthesia     Back pain     Chickenpox     [place] measles     Heart burn     High cholesterol     Hx of lithotripsy     Influenza     Mumps     Myocardial infarct (Formerly [facility]) 10/2022    Nasal drainage     Pulmonary hypertension (Formerly [facility])     Sleep apnea 2015    Tonsillitis        PAST SURGICAL HISTORY:   Past Surgical History:   Procedure Laterality Date    UMBILICAL  HERNIA REPAIR  02/23/2015    Performed by John H Ganser, M.D. at SURGERY [facility]    OTHER ORTHOPEDIC SURGERY  2009    Right Knee Repair    OTHER ORTHOPEDIC SURGERY  1988    Right Meniscus    [id number]      NASAL RECONSTRUCTION      deviatum septum, sinus surgery around 1998,2013    TONSILLECTOMY       GASTROINTESTIONAL ENDOSCOPIC      multiple for pacreatities        ALLERGIES: No Known Allergies     CURRENT MEDICATIONS:   Current Outpatient Medications:     acetaminophen (TYLENOL) 500 MG Tab, Take 1,000 mg by mouth 1 time a day as needed for Mild Pain., Disp: , Rfl:     tamsulosin (FLOMAX) 0.4 MG capsule, Take 0.4 mg by mouth 1/2 hour after breakfast., Disp: , Rfl:     Pancrelipase, Lip-Prot-Amyl, (CREON) 65858-546665 units Cap DR Particles, Take 2 Tablets by mouth in the morning, at noon, and at bedtime., Disp: , Rfl:     metoprolol tartrate (LOPRESSOR) 25 MG Tab, Take 1 Tablet by mouth 2 times a day., Disp: 180 Tablet, Rfl: 3    atorvastatin (LIPITOR) 40 MG Tab, Take 1 Tablet by mouth every evening., Disp: 90 Tablet, Rfl: 3    omeprazole (PRILOSEC) 20 MG delayed-release capsule, Take 20 mg by mouth every day., Disp: , Rfl:     FAMILY HISTORY:   Family History   Problem Relation Age of Onset    Heart Attack Father     Heart Disease Father     Sleep Apnea Father         SOCIAL HISTORY:   Social History     Socioeconomic History    Marital status:      Spouse name: Not on file    Number of children: Not on file    Years of education: Not on file    Highest education level: Not on file   Occupational History    Not on file   Tobacco Use    Smoking status: Never    Smokeless tobacco: Never   Vaping Use    Vaping Use: Never used   Substance and Sexual Activity    Alcohol use: Not Currently     Comment: 0-1 glasses of wine a week    Drug use: No    Sexual activity: Not on file   Other Topics Concern    Not on file   Social History Narrative    Not on file     Social Determinants of Health     Financial  "Resource Strain: Not on file   Food Insecurity: Not on file   Transportation Needs: Not on file   Physical Activity: Not on file   Stress: Not on file   Social Connections: Not on file   Intimate Partner Violence: Not on file   Housing Stability: Not on file       REVIEW OF SYSTEMS:  Review of Systems   Constitutional:  Positive for malaise/fatigue. Negative for chills, fever and weight loss.   HENT:  Negative for ear pain, nosebleeds and tinnitus.    Eyes:  Negative for double vision, photophobia and pain.   Respiratory:  Negative for cough, hemoptysis and shortness of breath.    Cardiovascular:  Positive for chest pain. Negative for palpitations, orthopnea, leg swelling and PND.   Gastrointestinal:  Negative for abdominal pain, blood in stool, nausea and vomiting.   Genitourinary:  Negative for frequency, hematuria and urgency.   Musculoskeletal: Negative.    Skin:  Negative for rash.   Neurological:  Negative for dizziness, tremors, speech change, focal weakness, seizures and headaches.   Endo/Heme/Allergies:  Negative for polydipsia. Does not bruise/bleed easily.   Psychiatric/Behavioral:  Negative for hallucinations and memory loss.          PHYSICAL EXAMINATION:    /70 (BP Location: Left arm, Patient Position: Sitting, BP Cuff Size: Adult)   Pulse 61   Temp 36.8 °C (98.2 °F) (Temporal)   Ht 1.753 m (5' 9\")   Wt 98.7 kg (217 lb 8 oz)   SpO2 95%   BMI 32.12 kg/m²    Physical Exam  Vitals reviewed.   Constitutional:       General: He is not in acute distress.     Appearance: Normal appearance.   HENT:      Head: Normocephalic and atraumatic.      Right Ear: External ear normal.      Left Ear: External ear normal.      Nose: Nose normal. No congestion.   Eyes:      General: No scleral icterus.     Extraocular Movements: Extraocular movements intact.      Conjunctiva/sclera: Conjunctivae normal.   Cardiovascular:      Rate and Rhythm: Normal rate and regular rhythm.   Pulmonary:      Effort: Pulmonary " effort is normal. No respiratory distress.   Abdominal:      General: There is no distension.      Palpations: Abdomen is soft.   Musculoskeletal:         General: Normal range of motion.      Cervical back: Normal range of motion.   Skin:     General: Skin is warm and dry.      Coloration: Skin is not jaundiced.      Findings: No rash.   Neurological:      Mental Status: He is alert and oriented to person, place, and time.      Cranial Nerves: No cranial nerve deficit.   Psychiatric:         Mood and Affect: Mood normal.         Behavior: Behavior normal.             LABS REVIEWED:  Lab Results   Component Value Date/Time    SODIUM 137 08/22/2023 07:00 AM    POTASSIUM 4.3 08/22/2023 07:00 AM    CHLORIDE 103 08/22/2023 07:00 AM    CO2 21 08/22/2023 07:00 AM    GLUCOSE 400 (H) 08/22/2023 07:00 AM    BUN 20 08/22/2023 07:00 AM    CREATININE 1.11 08/22/2023 07:00 AM      Lab Results   Component Value Date/Time    PROTHROMBTM 12.7 08/22/2023 07:00 AM    INR 0.96 08/22/2023 07:00 AM      Lab Results   Component Value Date/Time    WBC 5.4 08/22/2023 07:00 AM    RBC 5.20 08/22/2023 07:00 AM    HEMOGLOBIN 16.0 08/22/2023 07:00 AM    HEMATOCRIT 44.7 08/22/2023 07:00 AM    MCV 86.0 08/22/2023 07:00 AM    MCH 30.8 08/22/2023 07:00 AM    MCHC 35.8 08/22/2023 07:00 AM    MPV 10.4 08/22/2023 07:00 AM    NEUTSPOLYS 57.40 07/07/2022 09:10 AM    LYMPHOCYTES 27.60 07/07/2022 09:10 AM    MONOCYTES 11.70 07/07/2022 09:10 AM    EOSINOPHILS 2.30 07/07/2022 09:10 AM    BASOPHILS 0.70 07/07/2022 09:10 AM        IMAGING REVIEWED AND INTERPRETED:    [place]OCARDIOGRAM   [place] [facility]  [place]CLUSIONS  Prior study on 1/19/23, compared to the report of the prior study,   there has been no significant change.   Normal left ventricular systolic function.  The left ventricular ejection fraction is visually estimated to be 65%.  No significant valvular abnormalities.        CARDIAC CATHETERIZATION   [place] [facility]  HEMODYNAMICS:  Aortic pressure: 96 /60/74  mmHg  LVEDP: 9 mmHg  Mild transaortic gradient noted on pullback     CORONARY ANGIOGRAPHY:  The left main coronary artery : Large-caliber vessel with nonobstructive CAD, bifurcates to LAD and left circumflex.  The left anterior descending coronary artery :  Large-caliber transapical vessel with severe 80-90% mid stenosis and provides left to left collaterals to the occluded left circumflex and left-to-right collaterals to the occluded RCA.  Gives rise to several small-medium caliber diagonal branches  The left circumflex coronary artery : Presumably large in caliber vessel with  at its ostium with left to left collaterals  The right coronary artery  : Large-caliber dominant vessel with proximal , left-to-right collaterals.     IMPRESSION:  1.  Severe multivessel CAD  2.  Normal resting LVEDP 9 mmHg with mild transaortic gradient on pullback < 20mmHg      IMPRESSION:  66 yo gentleman with known conditions of hypertension, hyperlipidemia, NSTEMI, pancreatitis (hospitalized for several months starting in Oct, still recuperating physically), obesity, NARCISO now with multivessel CAD including  RCA and Cx and stable angina.      PLAN:  I recommend CABG x 3 next available appointment.  The procedure, its risks, benefits, potential complications and alternative treatments were discussed with the patient in detail including the risks should he decide not to undergo my recommended treatment. All of his questions were answered to his satisfaction and he is willing to proceed with the operation. The risks include death, stroke, infection: to include a rare bacterial infection related to the use of the heart/lung machine, maninder-operative myocardial infarction, dysrhythmias, diaphragmatic paralysis, chest wall paresthesia, tracheostomy, kidney or other organ failure, possible return to the operating room for bleeding, bleeding requiring transfusion with its attendant risks including AIDS or hepatitis, dehiscence of surgical  incisions, respiratory complications including the need for prolonged ventilator support, Protamine or other drug reaction, peripheral neuropathy, loss of limb, and miscount of surgical items. The operative mortality risk is approximately 1-2%. The STS mortality risk score is 1% and the morbidity and mortality risk score is 5-10%. The scores were discussed with patient.    Thank you for this very challenging consultation and participation in the patient’s care.  I will keep you apprised of all future developments.      The operation is scheduled for Thursday September 14th at 7:30 AM at [facility].         Sincerely,       Lizet Mann MD.

## 2023-08-29 ENCOUNTER — OFFICE VISIT (OUTPATIENT)
Dept: CARDIOTHORACIC SURGERY | Facility: MEDICAL CENTER | Age: 68
End: 2023-08-29
Payer: MEDICARE

## 2023-08-29 VITALS
WEIGHT: 217.5 LBS | BODY MASS INDEX: 32.22 KG/M2 | OXYGEN SATURATION: 95 % | DIASTOLIC BLOOD PRESSURE: 70 MMHG | TEMPERATURE: 98.2 F | HEART RATE: 61 BPM | HEIGHT: 69 IN | SYSTOLIC BLOOD PRESSURE: 112 MMHG

## 2023-08-29 DIAGNOSIS — I25.110 CORONARY ARTERY DISEASE INVOLVING NATIVE CORONARY ARTERY OF NATIVE HEART WITH UNSTABLE ANGINA PECTORIS (HCC): ICD-10-CM

## 2023-08-29 PROCEDURE — 3078F DIAST BP <80 MM HG: CPT | Performed by: THORACIC SURGERY (CARDIOTHORACIC VASCULAR SURGERY)

## 2023-08-29 PROCEDURE — 3074F SYST BP LT 130 MM HG: CPT | Performed by: THORACIC SURGERY (CARDIOTHORACIC VASCULAR SURGERY)

## 2023-08-29 PROCEDURE — 99205 OFFICE O/P NEW HI 60 MIN: CPT | Performed by: THORACIC SURGERY (CARDIOTHORACIC VASCULAR SURGERY)

## 2023-08-29 ASSESSMENT — ENCOUNTER SYMPTOMS
EYE PAIN: 0
PHOTOPHOBIA: 0
DOUBLE VISION: 0
FOCAL WEAKNESS: 0
BRUISES/BLEEDS EASILY: 0
COUGH: 0
PND: 0
HEADACHES: 0
SHORTNESS OF BREATH: 0
PALPITATIONS: 0
MUSCULOSKELETAL NEGATIVE: 1
POLYDIPSIA: 0
WEIGHT LOSS: 0
BLOOD IN STOOL: 0
ABDOMINAL PAIN: 0
DIZZINESS: 0
NAUSEA: 0
HEMOPTYSIS: 0
MEMORY LOSS: 0
FEVER: 0
HALLUCINATIONS: 0
SPEECH CHANGE: 0
ORTHOPNEA: 0
SEIZURES: 0
VOMITING: 0
TREMORS: 0
CHILLS: 0

## 2023-08-29 ASSESSMENT — FIBROSIS 4 INDEX: FIB4 SCORE: 1.55

## 2023-08-31 ENCOUNTER — TELEPHONE (OUTPATIENT)
Dept: CARDIOTHORACIC SURGERY | Facility: MEDICAL CENTER | Age: 68
End: 2023-08-31
Payer: MEDICARE

## 2023-08-31 ENCOUNTER — APPOINTMENT (OUTPATIENT)
Dept: ADMISSIONS | Facility: MEDICAL CENTER | Age: 68
DRG: 231 | End: 2023-08-31
Attending: THORACIC SURGERY (CARDIOTHORACIC VASCULAR SURGERY)
Payer: MEDICARE

## 2023-08-31 NOTE — TELEPHONE ENCOUNTER
Problem: Prehabilitation    Goal: optimize identified modifiable risk factors prior to cardiac surgery.     Intervention: Screening and interventions for the following  risk factors with educational materials provided if indicated  and patient demonstrates readiness to participate.  Dentition, malnutrition, CAD and dietary cholesterol,  obesity, alcohol and tobacco abuse, illegal drug use, and   social support system for post discharge planning.    Additionally, home exercise regimen initiation or continuation  (if appropriate), and teaching of and participation of  inspiratory muscle training via incentive spirometer (provided).    Review of post-surgical physical limitations, upcoming  appointments & testing, ordered diagnostics, and medication review  to identify and educate on anticoagulant and antihypertensives  that need cessation in the days prior to surgery.    The cardiac surgery prehabilitation sheet, surgery instruction sheet,  MAP, education booklet, vitals logbook, normals after heart surgery,  and cardiac rehab flier was provided for patient to read and review.    Surgical CHG wipes were also provided with instructions on use.    DENTITION:  Routine dental appointment prior to surgery is  not indicated for CABG procedure.    he was not encouraged to get a dental   cleaning as he does not get regular  dental cleaning and denies current dental   infection or issues that should be  addressed prior to surgery.    INCENTIVE SPIROMETRY:  Discussed importance of incentive spirometry (IS) use,  20 times a day AT MINIMUM but more often if possible to  optimize cardio-pulmonary function prior to surgery.  They were instructed to allow a 5 minute rest in  between uses to achieve max IS volume.  Education with return demonstration performed.   Patient is effective, coaching was needed.    Prehabilitation IS baseline is 4000.    HOME EXERCISE REGIMEN:  We discussed importance of preventing deconditioning  and  muscle wasting in the time preceding surgery as this will occur  post surgery.    > 50 % left main disease present? NO  EF-- 65%  Active sub lingual nitro use? NO  he is appropriate for initiation  of a home exercise regimen.    he is minimally physically active; current  exercise tolerance/level is low due to  symptoms of CP with exertion.    he  was educated to start an   exercise regimen of walking on   a flat surface 30 minutes a day, adjusting the  length for tolerance level.     he was educated  that if chest pain or SOB occurs patient is to stop  immediately and if symptoms do not  resolve after stopping to call 911.    he was also encouraged to practice sit to stand  from a chair with one arm to assist or no arm assistance  12 times a day to strengthen quadriceps and improve balance.    CAD:  Patient does have known CAD; education on  cholesterol, diet, and the heart are indicated  and were provided electronically    OBESITY:  Patient's BMI  over 30.  Education regarding portion sizing  and diet are indicated and were provided electronically    MALNUTRITION:  Malnutrition screening tool (MST) shows he is at risk  with a score of 4. (0-1 not at risk; greater or equal to 2 is at risk).    Given MST score, functional capacity,   and reported muscle loss, it was  recommended they increase their protein  intake to 1.2 to 2.5 gram/kg/day    This was calculated and provided to them at   118 grams per day. Assessment of implementation  During educational phone appointment will be done.    SMOKING:  Patient denies current smoking history.  Smoking risks  and cessation education not indicated and was not provided.    ALCOHOL ABUSE:  Patient denies alcohol abuse.  Alcohol risks and cessation  education denies indicated and was not provided.    ILLEGAL DRUG USE:  Patient denies illicit drug use.  Illicit drug use risks  and cessation education not indicated and was not provided    SOCIAL SUPPORT SYSTEM FOR  DISCHARGE NEEDS:    Patient does have family,  Monisha to stay for  1-week post discharge to assist with ADL's. No barriers  to hospital discharge anticipated.    PSYCHOLOGICAL PREPARATION:  We discussed the basics of physical limitation post op to include:               No driving for 4 weeks               No lifting, pushing or pulling > 10 lbs for 6 weeks  Sternal precautions to include moving within the tube and  safe mobility in and out of bed and the chair.    ANTIBIOTIC STEWARDSHIP:  MRSA swab will be collected by [facility] during pre-admit testing.    MEDICATION AN UPCOMING APPOINTMENTS REVIEW:  Current medications were reviewed that may need  specific stop dates prior to surgery. The patient was instructed   to stop the following medications after the indicated date.    No meds to stop    Vascular scheduling sheet was provided and explained.    Walk test Times: unable to collect    A phone appointment for pre op education was made  for September 12th at lunch time to review all provided education materials.

## 2023-09-01 ENCOUNTER — HOSPITAL ENCOUNTER (OUTPATIENT)
Dept: RADIOLOGY | Facility: MEDICAL CENTER | Age: 68
End: 2023-09-01
Attending: THORACIC SURGERY (CARDIOTHORACIC VASCULAR SURGERY)
Payer: MEDICARE

## 2023-09-01 DIAGNOSIS — I25.110 CORONARY ARTERY DISEASE INVOLVING NATIVE CORONARY ARTERY OF NATIVE HEART WITH UNSTABLE ANGINA PECTORIS (HCC): ICD-10-CM

## 2023-09-01 PROCEDURE — 93880 EXTRACRANIAL BILAT STUDY: CPT | Mod: 26 | Performed by: INTERNAL MEDICINE

## 2023-09-01 PROCEDURE — 93880 EXTRACRANIAL BILAT STUDY: CPT

## 2023-09-07 ENCOUNTER — PRE-ADMISSION TESTING (OUTPATIENT)
Dept: ADMISSIONS | Facility: MEDICAL CENTER | Age: 68
DRG: 231 | End: 2023-09-07
Attending: THORACIC SURGERY (CARDIOTHORACIC VASCULAR SURGERY)
Payer: MEDICARE

## 2023-09-07 RX ORDER — CETIRIZINE HYDROCHLORIDE 10 MG/1
10 TABLET ORAL DAILY
COMMUNITY

## 2023-09-07 RX ORDER — ASPIRIN 81 MG/1
162 TABLET ORAL DAILY
Status: ON HOLD | COMMUNITY
End: 2023-09-20

## 2023-09-12 ENCOUNTER — HOSPITAL ENCOUNTER (OUTPATIENT)
Dept: RADIOLOGY | Facility: MEDICAL CENTER | Age: 68
DRG: 231 | End: 2023-09-12
Attending: THORACIC SURGERY (CARDIOTHORACIC VASCULAR SURGERY)
Payer: MEDICARE

## 2023-09-12 ENCOUNTER — PRE-ADMISSION TESTING (OUTPATIENT)
Dept: ADMISSIONS | Facility: MEDICAL CENTER | Age: 68
DRG: 231 | End: 2023-09-12
Attending: THORACIC SURGERY (CARDIOTHORACIC VASCULAR SURGERY)
Payer: MEDICARE

## 2023-09-12 ENCOUNTER — TELEPHONE (OUTPATIENT)
Dept: CARDIOTHORACIC SURGERY | Facility: MEDICAL CENTER | Age: 68
End: 2023-09-12

## 2023-09-12 DIAGNOSIS — Z01.811 PRE-OPERATIVE RESPIRATORY EXAMINATION: ICD-10-CM

## 2023-09-12 DIAGNOSIS — Z01.810 PRE-OPERATIVE CARDIOVASCULAR EXAMINATION: ICD-10-CM

## 2023-09-12 DIAGNOSIS — I25.110 CORONARY ARTERY DISEASE INVOLVING NATIVE CORONARY ARTERY OF NATIVE HEART WITH UNSTABLE ANGINA PECTORIS (HCC): ICD-10-CM

## 2023-09-12 DIAGNOSIS — Z01.812 PRE-OPERATIVE LABORATORY EXAMINATION: ICD-10-CM

## 2023-09-12 LAB
ABO GROUP BLD: NORMAL
ALBUMIN SERPL BCP-MCNC: 4.5 G/DL (ref 3.2–4.9)
ALBUMIN/GLOB SERPL: 1.6 G/DL
ALP SERPL-CCNC: 205 U/L (ref 30–99)
ALT SERPL-CCNC: 21 U/L (ref 2–50)
AMPHET UR QL SCN: NEGATIVE
ANION GAP SERPL CALC-SCNC: 16 MMOL/L (ref 7–16)
APPEARANCE UR: CLEAR
APTT PPP: 28.4 SEC (ref 24.7–36)
AST SERPL-CCNC: 13 U/L (ref 12–45)
BACTERIA #/AREA URNS HPF: NEGATIVE /HPF
BARBITURATES UR QL SCN: NEGATIVE
BASOPHILS # BLD AUTO: 0.4 % (ref 0–1.8)
BASOPHILS # BLD: 0.03 K/UL (ref 0–0.12)
BENZODIAZ UR QL SCN: NEGATIVE
BILIRUB SERPL-MCNC: 0.6 MG/DL (ref 0.1–1.5)
BILIRUB UR QL STRIP.AUTO: NEGATIVE
BLD GP AB SCN SERPL QL: NORMAL
BUN SERPL-MCNC: 23 MG/DL (ref 8–22)
BZE UR QL SCN: NEGATIVE
CALCIUM ALBUM COR SERPL-MCNC: 8.9 MG/DL (ref 8.5–10.5)
CALCIUM SERPL-MCNC: 9.3 MG/DL (ref 8.5–10.5)
CANNABINOIDS UR QL SCN: NEGATIVE
CHLORIDE SERPL-SCNC: 96 MMOL/L (ref 96–112)
CO2 SERPL-SCNC: 23 MMOL/L (ref 20–33)
COLOR UR: YELLOW
CREAT SERPL-MCNC: 1.01 MG/DL (ref 0.5–1.4)
EKG IMPRESSION: NORMAL
EOSINOPHIL # BLD AUTO: 0.1 K/UL (ref 0–0.51)
EOSINOPHIL NFR BLD: 1.5 % (ref 0–6.9)
EPI CELLS #/AREA URNS HPF: NEGATIVE /HPF
ERYTHROCYTE [DISTWIDTH] IN BLOOD BY AUTOMATED COUNT: 40 FL (ref 35.9–50)
EST. AVERAGE GLUCOSE BLD GHB EST-MCNC: 283 MG/DL
FENTANYL UR QL: NEGATIVE
GFR SERPLBLD CREATININE-BSD FMLA CKD-EPI: 82 ML/MIN/1.73 M 2
GLOBULIN SER CALC-MCNC: 2.9 G/DL (ref 1.9–3.5)
GLUCOSE SERPL-MCNC: 363 MG/DL (ref 65–99)
GLUCOSE UR STRIP.AUTO-MCNC: >=1000 MG/DL
HBA1C MFR BLD: 11.5 % (ref 4–5.6)
HCT VFR BLD AUTO: 46.4 % (ref 42–52)
HGB BLD-MCNC: 16.5 G/DL (ref 14–18)
HYALINE CASTS #/AREA URNS LPF: ABNORMAL /LPF
IMM GRANULOCYTES # BLD AUTO: 0.03 K/UL (ref 0–0.11)
IMM GRANULOCYTES NFR BLD AUTO: 0.4 % (ref 0–0.9)
INR PPP: 0.99 (ref 0.87–1.13)
KETONES UR STRIP.AUTO-MCNC: 15 MG/DL
LEUKOCYTE ESTERASE UR QL STRIP.AUTO: NEGATIVE
LYMPHOCYTES # BLD AUTO: 1.41 K/UL (ref 1–4.8)
LYMPHOCYTES NFR BLD: 20.6 % (ref 22–41)
MCH RBC QN AUTO: 30.7 PG (ref 27–33)
MCHC RBC AUTO-ENTMCNC: 35.6 G/DL (ref 32.3–36.5)
MCV RBC AUTO: 86.2 FL (ref 81.4–97.8)
METHADONE UR QL SCN: NEGATIVE
MICRO URNS: ABNORMAL
MONOCYTES # BLD AUTO: 0.58 K/UL (ref 0–0.85)
MONOCYTES NFR BLD AUTO: 8.5 % (ref 0–13.4)
NEUTROPHILS # BLD AUTO: 4.69 K/UL (ref 1.82–7.42)
NEUTROPHILS NFR BLD: 68.6 % (ref 44–72)
NITRITE UR QL STRIP.AUTO: NEGATIVE
NRBC # BLD AUTO: 0 K/UL
NRBC BLD-RTO: 0 /100 WBC (ref 0–0.2)
OPIATES UR QL SCN: NEGATIVE
OXYCODONE UR QL SCN: NEGATIVE
PCP UR QL SCN: NEGATIVE
PH UR STRIP.AUTO: 6.5 [PH] (ref 5–8)
PLATELET # BLD AUTO: 180 K/UL (ref 164–446)
PMV BLD AUTO: 11.3 FL (ref 9–12.9)
POTASSIUM SERPL-SCNC: 5 MMOL/L (ref 3.6–5.5)
PROPOXYPH UR QL SCN: NEGATIVE
PROT SERPL-MCNC: 7.4 G/DL (ref 6–8.2)
PROT UR QL STRIP: 30 MG/DL
PROTHROMBIN TIME: 13.2 SEC (ref 12–14.6)
RBC # BLD AUTO: 5.38 M/UL (ref 4.7–6.1)
RBC # URNS HPF: ABNORMAL /HPF
RBC UR QL AUTO: NEGATIVE
RH BLD: NORMAL
SCCMEC + MECA PNL NOSE NAA+PROBE: NEGATIVE
SCCMEC + MECA PNL NOSE NAA+PROBE: NEGATIVE
SODIUM SERPL-SCNC: 135 MMOL/L (ref 135–145)
SP GR UR STRIP.AUTO: 1.03
UROBILINOGEN UR STRIP.AUTO-MCNC: 0.2 MG/DL
WBC # BLD AUTO: 6.8 K/UL (ref 4.8–10.8)
WBC #/AREA URNS HPF: ABNORMAL /HPF

## 2023-09-12 PROCEDURE — 87641 MR-STAPH DNA AMP PROBE: CPT

## 2023-09-12 PROCEDURE — 93970 EXTREMITY STUDY: CPT

## 2023-09-12 PROCEDURE — 93005 ELECTROCARDIOGRAM TRACING: CPT | Performed by: THORACIC SURGERY (CARDIOTHORACIC VASCULAR SURGERY)

## 2023-09-12 PROCEDURE — 71046 X-RAY EXAM CHEST 2 VIEWS: CPT

## 2023-09-12 PROCEDURE — 86900 BLOOD TYPING SEROLOGIC ABO: CPT

## 2023-09-12 PROCEDURE — 87640 STAPH A DNA AMP PROBE: CPT

## 2023-09-12 PROCEDURE — 85025 COMPLETE CBC W/AUTO DIFF WBC: CPT

## 2023-09-12 PROCEDURE — 85730 THROMBOPLASTIN TIME PARTIAL: CPT

## 2023-09-12 PROCEDURE — 93970 EXTREMITY STUDY: CPT | Mod: 26 | Performed by: INTERNAL MEDICINE

## 2023-09-12 PROCEDURE — 86901 BLOOD TYPING SEROLOGIC RH(D): CPT

## 2023-09-12 PROCEDURE — 80307 DRUG TEST PRSMV CHEM ANLYZR: CPT

## 2023-09-12 PROCEDURE — 85610 PROTHROMBIN TIME: CPT

## 2023-09-12 PROCEDURE — 36415 COLL VENOUS BLD VENIPUNCTURE: CPT

## 2023-09-12 PROCEDURE — 86850 RBC ANTIBODY SCREEN: CPT

## 2023-09-12 PROCEDURE — 80053 COMPREHEN METABOLIC PANEL: CPT

## 2023-09-12 PROCEDURE — 83036 HEMOGLOBIN GLYCOSYLATED A1C: CPT | Mod: GA

## 2023-09-12 PROCEDURE — 81001 URINALYSIS AUTO W/SCOPE: CPT

## 2023-09-12 PROCEDURE — 93010 ELECTROCARDIOGRAM REPORT: CPT | Performed by: INTERNAL MEDICINE

## 2023-09-12 NOTE — TELEPHONE ENCOUNTER
Patient was reminded that CABG X 3 surgery with  Dr. Mann is on 9/15 at 0730 in the morning. he   are aware check in is at 0515 am.    Baseline IS was 4000. he has been compliant   with the IS. Volume has not improved past 2000.    he was prescribed a walking regimen or  told to continue he current regimen and   he has been compliant.   he has been practicing sit to stand.    He was given a protein boost regimen. He has not been compliant as he thought this was for after surgery.    The CHG wipes instructions were reviewed and understood.    PAT date and time was reviewed with patient and  verified it is within 72 hours of surgery.    Vascular studies and procedures: carotids and vein mapping  were scheduled, completed,  and reviewed by myself for concerning  results did not need escalation to the CHRISTO/MD.    he did not need a dental check/work.    He had no meds to stop    He was told that zyrtec, omeprazole, tamsulosin, pancrelipase, medication(s) are okay to take the morning of surgery prior to check in.     he was reminded no food after midnight.    Call time 8 minutes

## 2023-09-14 ENCOUNTER — ANESTHESIA EVENT (OUTPATIENT)
Dept: SURGERY | Facility: MEDICAL CENTER | Age: 68
DRG: 231 | End: 2023-09-14
Payer: MEDICARE

## 2023-09-15 ENCOUNTER — APPOINTMENT (OUTPATIENT)
Dept: CARDIOLOGY | Facility: MEDICAL CENTER | Age: 68
DRG: 231 | End: 2023-09-15
Attending: THORACIC SURGERY (CARDIOTHORACIC VASCULAR SURGERY)
Payer: MEDICARE

## 2023-09-15 ENCOUNTER — APPOINTMENT (OUTPATIENT)
Dept: RADIOLOGY | Facility: MEDICAL CENTER | Age: 68
DRG: 231 | End: 2023-09-15
Attending: THORACIC SURGERY (CARDIOTHORACIC VASCULAR SURGERY)
Payer: MEDICARE

## 2023-09-15 ENCOUNTER — ANESTHESIA (OUTPATIENT)
Dept: SURGERY | Facility: MEDICAL CENTER | Age: 68
DRG: 231 | End: 2023-09-15
Payer: MEDICARE

## 2023-09-15 ENCOUNTER — HOSPITAL ENCOUNTER (INPATIENT)
Facility: MEDICAL CENTER | Age: 68
LOS: 5 days | DRG: 231 | End: 2023-09-20
Attending: THORACIC SURGERY (CARDIOTHORACIC VASCULAR SURGERY) | Admitting: THORACIC SURGERY (CARDIOTHORACIC VASCULAR SURGERY)
Payer: MEDICARE

## 2023-09-15 DIAGNOSIS — I25.118 CORONARY ARTERY DISEASE OF NATIVE ARTERY OF NATIVE HEART WITH STABLE ANGINA PECTORIS (HCC): ICD-10-CM

## 2023-09-15 DIAGNOSIS — E11.9 TYPE 2 DIABETES MELLITUS WITHOUT COMPLICATION, WITHOUT LONG-TERM CURRENT USE OF INSULIN (HCC): ICD-10-CM

## 2023-09-15 DIAGNOSIS — Z95.1 S/P CABG X 3: ICD-10-CM

## 2023-09-15 DIAGNOSIS — G89.18 POST-OP PAIN: ICD-10-CM

## 2023-09-15 PROBLEM — Z99.11 ENCOUNTER FOR WEANING FROM VENTILATOR (HCC): Status: ACTIVE | Noted: 2023-09-15

## 2023-09-15 PROBLEM — D62 ACUTE BLOOD LOSS AS CAUSE OF POSTOPERATIVE ANEMIA: Status: ACTIVE | Noted: 2023-09-15

## 2023-09-15 LAB
ABO GROUP BLD: NORMAL
ACT BLD: 131 SEC (ref 74–137)
ACT BLD: 143 SEC (ref 74–137)
ACT BLD: 498 SEC (ref 74–137)
ACT BLD: 522 SEC (ref 74–137)
ACT BLD: 564 SEC (ref 74–137)
ACT BLD: 630 SEC (ref 74–137)
ACT BLD: 654 SEC (ref 74–137)
APTT PPP: 42.3 SEC (ref 24.7–36)
BARCODED ABORH UBTYP: 5100
BARCODED PRD CODE UBPRD: NORMAL
BARCODED UNIT NUM UBUNT: NORMAL
BASE EXCESS BLDA CALC-SCNC: -1 MMOL/L (ref -4–3)
BASE EXCESS BLDA CALC-SCNC: -2 MMOL/L (ref -4–3)
BASE EXCESS BLDA CALC-SCNC: -3 MMOL/L (ref -4–3)
BASE EXCESS BLDA CALC-SCNC: -3 MMOL/L (ref -4–3)
BASE EXCESS BLDA CALC-SCNC: -4 MMOL/L (ref -4–3)
BASE EXCESS BLDA CALC-SCNC: -5 MMOL/L (ref -4–3)
BASE EXCESS BLDA CALC-SCNC: -5 MMOL/L (ref -4–3)
BASE EXCESS BLDA CALC-SCNC: 0 MMOL/L (ref -4–3)
BASE EXCESS BLDA CALC-SCNC: 1 MMOL/L (ref -4–3)
BASE EXCESS BLDA CALC-SCNC: 2 MMOL/L (ref -4–3)
BLD GP AB SCN SERPL QL: NORMAL
BODY TEMPERATURE: ABNORMAL DEGREES
CA-I BLD ISE-SCNC: 0.93 MMOL/L (ref 1.1–1.3)
CA-I BLD ISE-SCNC: 0.98 MMOL/L (ref 1.1–1.3)
CA-I BLD ISE-SCNC: 1.01 MMOL/L (ref 1.1–1.3)
CA-I BLD ISE-SCNC: 1.02 MMOL/L (ref 1.1–1.3)
CA-I BLD ISE-SCNC: 1.13 MMOL/L (ref 1.1–1.3)
CA-I BLD ISE-SCNC: 1.17 MMOL/L (ref 1.1–1.3)
CA-I BLD ISE-SCNC: 1.19 MMOL/L (ref 1.1–1.3)
CA-I BLD ISE-SCNC: 1.22 MMOL/L (ref 1.1–1.3)
CO2 BLDA-SCNC: 22 MMOL/L (ref 20–33)
CO2 BLDA-SCNC: 22 MMOL/L (ref 20–33)
CO2 BLDA-SCNC: 23 MMOL/L (ref 20–33)
CO2 BLDA-SCNC: 24 MMOL/L (ref 20–33)
CO2 BLDA-SCNC: 25 MMOL/L (ref 20–33)
CO2 BLDA-SCNC: 27 MMOL/L (ref 20–33)
CO2 BLDA-SCNC: 28 MMOL/L (ref 20–33)
COMPONENT P 8504P: NORMAL
DELSYS IDSYS: ABNORMAL
DELSYS IDSYS: ABNORMAL
EKG IMPRESSION: NORMAL
END TIDAL CARBON DIOXIDE IECO2: 34 MMHG
END TIDAL CARBON DIOXIDE IECO2: 35 MMHG
GLUCOSE BLD STRIP.AUTO-MCNC: 273 MG/DL (ref 65–99)
GLUCOSE BLD STRIP.AUTO-MCNC: 297 MG/DL (ref 65–99)
GLUCOSE BLD STRIP.AUTO-MCNC: 301 MG/DL (ref 65–99)
GLUCOSE BLD STRIP.AUTO-MCNC: 313 MG/DL (ref 65–99)
GLUCOSE BLD STRIP.AUTO-MCNC: 317 MG/DL (ref 65–99)
GLUCOSE BLD STRIP.AUTO-MCNC: 355 MG/DL (ref 65–99)
GLUCOSE BLD STRIP.AUTO-MCNC: 383 MG/DL (ref 65–99)
GLUCOSE BLD STRIP.AUTO-MCNC: 398 MG/DL (ref 65–99)
HCO3 BLDA-SCNC: 20.6 MMOL/L (ref 17–25)
HCO3 BLDA-SCNC: 21 MMOL/L (ref 17–25)
HCO3 BLDA-SCNC: 22 MMOL/L (ref 17–25)
HCO3 BLDA-SCNC: 23.2 MMOL/L (ref 17–25)
HCO3 BLDA-SCNC: 23.4 MMOL/L (ref 17–25)
HCO3 BLDA-SCNC: 23.4 MMOL/L (ref 17–25)
HCO3 BLDA-SCNC: 24 MMOL/L (ref 17–25)
HCO3 BLDA-SCNC: 24 MMOL/L (ref 17–25)
HCO3 BLDA-SCNC: 26.2 MMOL/L (ref 17–25)
HCO3 BLDA-SCNC: 26.8 MMOL/L (ref 17–25)
HCT VFR BLD AUTO: 31 % (ref 42–52)
HCT VFR BLD CALC: 28 % (ref 42–52)
HCT VFR BLD CALC: 28 % (ref 42–52)
HCT VFR BLD CALC: 30 % (ref 42–52)
HCT VFR BLD CALC: 31 % (ref 42–52)
HCT VFR BLD CALC: 33 % (ref 42–52)
HCT VFR BLD CALC: 39 % (ref 42–52)
HCT VFR BLD CALC: 41 % (ref 42–52)
HGB BLD-MCNC: 10.2 G/DL (ref 14–18)
HGB BLD-MCNC: 10.5 G/DL (ref 14–18)
HGB BLD-MCNC: 11.2 G/DL (ref 14–18)
HGB BLD-MCNC: 11.3 G/DL (ref 14–18)
HGB BLD-MCNC: 13.3 G/DL (ref 14–18)
HGB BLD-MCNC: 13.9 G/DL (ref 14–18)
HGB BLD-MCNC: 9.5 G/DL (ref 14–18)
HGB BLD-MCNC: 9.5 G/DL (ref 14–18)
HOROWITZ INDEX BLDA+IHG-RTO: 137 MM[HG]
HOROWITZ INDEX BLDA+IHG-RTO: 141 MM[HG]
INR PPP: 1.3 (ref 0.87–1.13)
MAGNESIUM SERPL-MCNC: 2.9 MG/DL (ref 1.5–2.5)
MODE IMODE: ABNORMAL
MODE IMODE: ABNORMAL
O2/TOTAL GAS SETTING VFR VENT: 100 %
O2/TOTAL GAS SETTING VFR VENT: 60 %
PCO2 BLDA: 37 MMHG (ref 26–37)
PCO2 BLDA: 37.1 MMHG (ref 26–37)
PCO2 BLDA: 40.1 MMHG (ref 26–37)
PCO2 BLDA: 40.2 MMHG (ref 26–37)
PCO2 BLDA: 42 MMHG (ref 26–37)
PCO2 BLDA: 42.2 MMHG (ref 26–37)
PCO2 BLDA: 42.8 MMHG (ref 26–37)
PCO2 BLDA: 43.5 MMHG (ref 26–37)
PCO2 BLDA: 48.1 MMHG (ref 26–37)
PCO2 BLDA: 48.3 MMHG (ref 26–37)
PCO2 TEMP ADJ BLDA: 32.9 MMHG (ref 26–37)
PCO2 TEMP ADJ BLDA: 35.8 MMHG (ref 26–37)
PCO2 TEMP ADJ BLDA: 37.5 MMHG (ref 26–37)
PCO2 TEMP ADJ BLDA: 38.4 MMHG (ref 26–37)
PCO2 TEMP ADJ BLDA: 38.9 MMHG (ref 26–37)
PCO2 TEMP ADJ BLDA: 39.5 MMHG (ref 26–37)
PCO2 TEMP ADJ BLDA: 39.8 MMHG (ref 26–37)
PCO2 TEMP ADJ BLDA: 42.1 MMHG (ref 26–37)
PCO2 TEMP ADJ BLDA: 44.4 MMHG (ref 26–37)
PCO2 TEMP ADJ BLDA: 46.4 MMHG (ref 26–37)
PEEP END EXPIRATORY PRESSURE IPEEP: 8 CMH20
PEEP END EXPIRATORY PRESSURE IPEEP: 8 CMH20
PERCENT MINUTE VOLUME IPMV: 160
PERCENT MINUTE VOLUME IPMV: 160
PH BLDA: 7.29 [PH] (ref 7.4–7.5)
PH BLDA: 7.31 [PH] (ref 7.4–7.5)
PH BLDA: 7.32 [PH] (ref 7.4–7.5)
PH BLDA: 7.33 [PH] (ref 7.4–7.5)
PH BLDA: 7.33 [PH] (ref 7.4–7.5)
PH BLDA: 7.34 [PH] (ref 7.4–7.5)
PH BLDA: 7.4 [PH] (ref 7.4–7.5)
PH BLDA: 7.4 [PH] (ref 7.4–7.5)
PH BLDA: 7.41 [PH] (ref 7.4–7.5)
PH BLDA: 7.42 [PH] (ref 7.4–7.5)
PH TEMP ADJ BLDA: 7.32 [PH] (ref 7.4–7.5)
PH TEMP ADJ BLDA: 7.32 [PH] (ref 7.4–7.5)
PH TEMP ADJ BLDA: 7.33 [PH] (ref 7.4–7.5)
PH TEMP ADJ BLDA: 7.34 [PH] (ref 7.4–7.5)
PH TEMP ADJ BLDA: 7.34 [PH] (ref 7.4–7.5)
PH TEMP ADJ BLDA: 7.35 [PH] (ref 7.4–7.5)
PH TEMP ADJ BLDA: 7.42 [PH] (ref 7.4–7.5)
PH TEMP ADJ BLDA: 7.43 [PH] (ref 7.4–7.5)
PH TEMP ADJ BLDA: 7.44 [PH] (ref 7.4–7.5)
PH TEMP ADJ BLDA: 7.46 [PH] (ref 7.4–7.5)
PLATELET # BLD AUTO: 149 K/UL (ref 164–446)
PO2 BLDA: 102 MMHG (ref 64–87)
PO2 BLDA: 141 MMHG (ref 64–87)
PO2 BLDA: 267 MMHG (ref 64–87)
PO2 BLDA: 275 MMHG (ref 64–87)
PO2 BLDA: 375 MMHG (ref 64–87)
PO2 BLDA: 384 MMHG (ref 64–87)
PO2 BLDA: 79 MMHG (ref 64–87)
PO2 BLDA: 82 MMHG (ref 64–87)
PO2 BLDA: 92 MMHG (ref 64–87)
PO2 BLDA: >500 MMHG (ref 64–87)
PO2 TEMP ADJ BLDA: 134 MMHG (ref 64–87)
PO2 TEMP ADJ BLDA: 266 MMHG (ref 64–87)
PO2 TEMP ADJ BLDA: 271 MMHG (ref 64–87)
PO2 TEMP ADJ BLDA: 360 MMHG (ref 64–87)
PO2 TEMP ADJ BLDA: 369 MMHG (ref 64–87)
PO2 TEMP ADJ BLDA: 70 MMHG (ref 64–87)
PO2 TEMP ADJ BLDA: 76 MMHG (ref 64–87)
PO2 TEMP ADJ BLDA: 88 MMHG (ref 64–87)
PO2 TEMP ADJ BLDA: 98 MMHG (ref 64–87)
PO2 TEMP ADJ BLDA: >500 MMHG (ref 64–87)
POTASSIUM BLD-SCNC: 3.3 MMOL/L (ref 3.6–5.5)
POTASSIUM BLD-SCNC: 3.5 MMOL/L (ref 3.6–5.5)
POTASSIUM BLD-SCNC: 4.2 MMOL/L (ref 3.6–5.5)
POTASSIUM BLD-SCNC: 4.3 MMOL/L (ref 3.6–5.5)
POTASSIUM BLD-SCNC: 4.6 MMOL/L (ref 3.6–5.5)
POTASSIUM BLD-SCNC: 4.8 MMOL/L (ref 3.6–5.5)
POTASSIUM BLD-SCNC: 4.8 MMOL/L (ref 3.6–5.5)
POTASSIUM BLD-SCNC: 4.9 MMOL/L (ref 3.6–5.5)
POTASSIUM SERPL-SCNC: 3.6 MMOL/L (ref 3.6–5.5)
POTASSIUM SERPL-SCNC: 3.7 MMOL/L (ref 3.6–5.5)
POTASSIUM SERPL-SCNC: 3.7 MMOL/L (ref 3.6–5.5)
PRODUCT TYPE UPROD: NORMAL
PROTHROMBIN TIME: 16.4 SEC (ref 12–14.6)
RH BLD: NORMAL
SAO2 % BLDA: 100 % (ref 93–99)
SAO2 % BLDA: 94 % (ref 93–99)
SAO2 % BLDA: 95 % (ref 93–99)
SAO2 % BLDA: 96 % (ref 93–99)
SAO2 % BLDA: 97 % (ref 93–99)
SAO2 % BLDA: 99 % (ref 93–99)
SODIUM BLD-SCNC: 135 MMOL/L (ref 135–145)
SODIUM BLD-SCNC: 135 MMOL/L (ref 135–145)
SODIUM BLD-SCNC: 136 MMOL/L (ref 135–145)
SODIUM BLD-SCNC: 136 MMOL/L (ref 135–145)
SODIUM BLD-SCNC: 137 MMOL/L (ref 135–145)
SODIUM BLD-SCNC: 137 MMOL/L (ref 135–145)
SODIUM BLD-SCNC: 140 MMOL/L (ref 135–145)
SODIUM BLD-SCNC: 141 MMOL/L (ref 135–145)
SPECIMEN DRAWN FROM PATIENT: ABNORMAL
UNIT STATUS USTAT: NORMAL

## 2023-09-15 PROCEDURE — C1781 MESH (IMPLANTABLE): HCPCS | Performed by: THORACIC SURGERY (CARDIOTHORACIC VASCULAR SURGERY)

## 2023-09-15 PROCEDURE — 160048 HCHG OR STATISTICAL LEVEL 1-5: Performed by: THORACIC SURGERY (CARDIOTHORACIC VASCULAR SURGERY)

## 2023-09-15 PROCEDURE — 33572 OPEN CORONARY ENDARTERECTOMY: CPT | Mod: AS | Performed by: NURSE PRACTITIONER

## 2023-09-15 PROCEDURE — 700111 HCHG RX REV CODE 636 W/ 250 OVERRIDE (IP)

## 2023-09-15 PROCEDURE — 5A1221Z PERFORMANCE OF CARDIAC OUTPUT, CONTINUOUS: ICD-10-PCS | Performed by: THORACIC SURGERY (CARDIOTHORACIC VASCULAR SURGERY)

## 2023-09-15 PROCEDURE — 94669 MECHANICAL CHEST WALL OSCILL: CPT

## 2023-09-15 PROCEDURE — 770022 HCHG ROOM/CARE - ICU (200)

## 2023-09-15 PROCEDURE — 84295 ASSAY OF SERUM SODIUM: CPT | Mod: 91

## 2023-09-15 PROCEDURE — P9045 ALBUMIN (HUMAN), 5%, 250 ML: HCPCS | Mod: JZ,JG | Performed by: ANESTHESIOLOGY

## 2023-09-15 PROCEDURE — A9270 NON-COVERED ITEM OR SERVICE: HCPCS | Performed by: NURSE PRACTITIONER

## 2023-09-15 PROCEDURE — 85730 THROMBOPLASTIN TIME PARTIAL: CPT

## 2023-09-15 PROCEDURE — 99291 CRITICAL CARE FIRST HOUR: CPT | Performed by: INTERNAL MEDICINE

## 2023-09-15 PROCEDURE — 71045 X-RAY EXAM CHEST 1 VIEW: CPT

## 2023-09-15 PROCEDURE — 02C03ZZ EXTIRPATION OF MATTER FROM CORONARY ARTERY, ONE ARTERY, PERCUTANEOUS APPROACH: ICD-10-PCS | Performed by: THORACIC SURGERY (CARDIOTHORACIC VASCULAR SURGERY)

## 2023-09-15 PROCEDURE — 93325 DOPPLER ECHO COLOR FLOW MAPG: CPT

## 2023-09-15 PROCEDURE — 86900 BLOOD TYPING SEROLOGIC ABO: CPT

## 2023-09-15 PROCEDURE — 86901 BLOOD TYPING SEROLOGIC RH(D): CPT

## 2023-09-15 PROCEDURE — 94002 VENT MGMT INPAT INIT DAY: CPT

## 2023-09-15 PROCEDURE — 37799 UNLISTED PX VASCULAR SURGERY: CPT

## 2023-09-15 PROCEDURE — 160009 HCHG ANES TIME/MIN: Performed by: THORACIC SURGERY (CARDIOTHORACIC VASCULAR SURGERY)

## 2023-09-15 PROCEDURE — C9248 INJ, CLEVIDIPINE BUTYRATE: HCPCS | Mod: JG | Performed by: ANESTHESIOLOGY

## 2023-09-15 PROCEDURE — P9047 ALBUMIN (HUMAN), 25%, 50ML: HCPCS | Mod: JG

## 2023-09-15 PROCEDURE — 503001 HCHG PERFUSION: Performed by: THORACIC SURGERY (CARDIOTHORACIC VASCULAR SURGERY)

## 2023-09-15 PROCEDURE — 700105 HCHG RX REV CODE 258: Performed by: ANESTHESIOLOGY

## 2023-09-15 PROCEDURE — 33572 OPEN CORONARY ENDARTERECTOMY: CPT | Performed by: THORACIC SURGERY (CARDIOTHORACIC VASCULAR SURGERY)

## 2023-09-15 PROCEDURE — 33508 ENDOSCOPIC VEIN HARVEST: CPT | Mod: AS,59 | Performed by: NURSE PRACTITIONER

## 2023-09-15 PROCEDURE — 700101 HCHG RX REV CODE 250: Performed by: THORACIC SURGERY (CARDIOTHORACIC VASCULAR SURGERY)

## 2023-09-15 PROCEDURE — P9034 PLATELETS, PHERESIS: HCPCS

## 2023-09-15 PROCEDURE — C1768 GRAFT, VASCULAR: HCPCS | Performed by: THORACIC SURGERY (CARDIOTHORACIC VASCULAR SURGERY)

## 2023-09-15 PROCEDURE — 160042 HCHG SURGERY MINUTES - EA ADDL 1 MIN LEVEL 5: Performed by: THORACIC SURGERY (CARDIOTHORACIC VASCULAR SURGERY)

## 2023-09-15 PROCEDURE — 110454 HCHG SHELL REV 250: Performed by: THORACIC SURGERY (CARDIOTHORACIC VASCULAR SURGERY)

## 2023-09-15 PROCEDURE — 160031 HCHG SURGERY MINUTES - 1ST 30 MINS LEVEL 5: Performed by: THORACIC SURGERY (CARDIOTHORACIC VASCULAR SURGERY)

## 2023-09-15 PROCEDURE — 33533 CABG ARTERIAL SINGLE: CPT | Performed by: THORACIC SURGERY (CARDIOTHORACIC VASCULAR SURGERY)

## 2023-09-15 PROCEDURE — 30233R1 TRANSFUSION OF NONAUTOLOGOUS PLATELETS INTO PERIPHERAL VEIN, PERCUTANEOUS APPROACH: ICD-10-PCS | Performed by: THORACIC SURGERY (CARDIOTHORACIC VASCULAR SURGERY)

## 2023-09-15 PROCEDURE — 85049 AUTOMATED PLATELET COUNT: CPT

## 2023-09-15 PROCEDURE — C1751 CATH, INF, PER/CENT/MIDLINE: HCPCS | Performed by: THORACIC SURGERY (CARDIOTHORACIC VASCULAR SURGERY)

## 2023-09-15 PROCEDURE — 700102 HCHG RX REV CODE 250 W/ 637 OVERRIDE(OP): Performed by: NURSE PRACTITIONER

## 2023-09-15 PROCEDURE — 36430 TRANSFUSION BLD/BLD COMPNT: CPT

## 2023-09-15 PROCEDURE — 700111 HCHG RX REV CODE 636 W/ 250 OVERRIDE (IP): Performed by: THORACIC SURGERY (CARDIOTHORACIC VASCULAR SURGERY)

## 2023-09-15 PROCEDURE — 85018 HEMOGLOBIN: CPT

## 2023-09-15 PROCEDURE — 021109W BYPASS CORONARY ARTERY, TWO ARTERIES FROM AORTA WITH AUTOLOGOUS VENOUS TISSUE, OPEN APPROACH: ICD-10-PCS | Performed by: THORACIC SURGERY (CARDIOTHORACIC VASCULAR SURGERY)

## 2023-09-15 PROCEDURE — 84132 ASSAY OF SERUM POTASSIUM: CPT | Mod: 91

## 2023-09-15 PROCEDURE — 85610 PROTHROMBIN TIME: CPT

## 2023-09-15 PROCEDURE — 700105 HCHG RX REV CODE 258: Performed by: THORACIC SURGERY (CARDIOTHORACIC VASCULAR SURGERY)

## 2023-09-15 PROCEDURE — 700111 HCHG RX REV CODE 636 W/ 250 OVERRIDE (IP): Performed by: ANESTHESIOLOGY

## 2023-09-15 PROCEDURE — 93010 ELECTROCARDIOGRAM REPORT: CPT | Performed by: INTERNAL MEDICINE

## 2023-09-15 PROCEDURE — 85347 COAGULATION TIME ACTIVATED: CPT | Mod: 91

## 2023-09-15 PROCEDURE — A9270 NON-COVERED ITEM OR SERVICE: HCPCS | Performed by: THORACIC SURGERY (CARDIOTHORACIC VASCULAR SURGERY)

## 2023-09-15 PROCEDURE — 700105 HCHG RX REV CODE 258: Performed by: NURSE PRACTITIONER

## 2023-09-15 PROCEDURE — 33508 ENDOSCOPIC VEIN HARVEST: CPT | Mod: 59 | Performed by: THORACIC SURGERY (CARDIOTHORACIC VASCULAR SURGERY)

## 2023-09-15 PROCEDURE — 94150 VITAL CAPACITY TEST: CPT

## 2023-09-15 PROCEDURE — 700111 HCHG RX REV CODE 636 W/ 250 OVERRIDE (IP): Performed by: NURSE PRACTITIONER

## 2023-09-15 PROCEDURE — 700102 HCHG RX REV CODE 250 W/ 637 OVERRIDE(OP): Performed by: THORACIC SURGERY (CARDIOTHORACIC VASCULAR SURGERY)

## 2023-09-15 PROCEDURE — 82962 GLUCOSE BLOOD TEST: CPT | Mod: 91

## 2023-09-15 PROCEDURE — C1894 INTRO/SHEATH, NON-LASER: HCPCS | Performed by: THORACIC SURGERY (CARDIOTHORACIC VASCULAR SURGERY)

## 2023-09-15 PROCEDURE — 700105 HCHG RX REV CODE 258

## 2023-09-15 PROCEDURE — 700101 HCHG RX REV CODE 250: Performed by: ANESTHESIOLOGY

## 2023-09-15 PROCEDURE — 86850 RBC ANTIBODY SCREEN: CPT

## 2023-09-15 PROCEDURE — 93005 ELECTROCARDIOGRAM TRACING: CPT | Performed by: NURSE PRACTITIONER

## 2023-09-15 PROCEDURE — 83735 ASSAY OF MAGNESIUM: CPT

## 2023-09-15 PROCEDURE — 33533 CABG ARTERIAL SINGLE: CPT | Mod: AS | Performed by: NURSE PRACTITIONER

## 2023-09-15 PROCEDURE — 85014 HEMATOCRIT: CPT | Mod: 91

## 2023-09-15 PROCEDURE — 06BP4ZZ EXCISION OF RIGHT SAPHENOUS VEIN, PERCUTANEOUS ENDOSCOPIC APPROACH: ICD-10-PCS | Performed by: THORACIC SURGERY (CARDIOTHORACIC VASCULAR SURGERY)

## 2023-09-15 PROCEDURE — C1898 LEAD, PMKR, OTHER THAN TRANS: HCPCS | Performed by: THORACIC SURGERY (CARDIOTHORACIC VASCULAR SURGERY)

## 2023-09-15 PROCEDURE — 82803 BLOOD GASES ANY COMBINATION: CPT | Mod: 91

## 2023-09-15 PROCEDURE — 94799 UNLISTED PULMONARY SVC/PX: CPT

## 2023-09-15 PROCEDURE — 33518 CABG ARTERY-VEIN TWO: CPT | Mod: AS | Performed by: NURSE PRACTITIONER

## 2023-09-15 PROCEDURE — 02100Z9 BYPASS CORONARY ARTERY, ONE ARTERY FROM LEFT INTERNAL MAMMARY, OPEN APPROACH: ICD-10-PCS | Performed by: THORACIC SURGERY (CARDIOTHORACIC VASCULAR SURGERY)

## 2023-09-15 PROCEDURE — 33518 CABG ARTERY-VEIN TWO: CPT | Performed by: THORACIC SURGERY (CARDIOTHORACIC VASCULAR SURGERY)

## 2023-09-15 PROCEDURE — 110371 HCHG SHELL REV 272: Performed by: THORACIC SURGERY (CARDIOTHORACIC VASCULAR SURGERY)

## 2023-09-15 PROCEDURE — 82330 ASSAY OF CALCIUM: CPT | Mod: 91

## 2023-09-15 PROCEDURE — 700101 HCHG RX REV CODE 250

## 2023-09-15 DEVICE — DRESSING HEMOSTATIC EVARREST 2INX4IN (2EA/BX): Type: IMPLANTABLE DEVICE | Site: CHEST | Status: FUNCTIONAL

## 2023-09-15 RX ORDER — MAGNESIUM SULFATE 1 G/100ML
1 INJECTION INTRAVENOUS DAILY
Status: COMPLETED | OUTPATIENT
Start: 2023-09-15 | End: 2023-09-17

## 2023-09-15 RX ORDER — PAPAVERINE HYDROCHLORIDE 30 MG/ML
INJECTION INTRAMUSCULAR; INTRAVENOUS
Status: DISCONTINUED | OUTPATIENT
Start: 2023-09-15 | End: 2023-09-15 | Stop reason: HOSPADM

## 2023-09-15 RX ORDER — DIPHENHYDRAMINE HCL 25 MG
25 TABLET ORAL
Status: DISCONTINUED | OUTPATIENT
Start: 2023-09-15 | End: 2023-09-20 | Stop reason: HOSPADM

## 2023-09-15 RX ORDER — SODIUM CHLORIDE, SODIUM GLUCONATE, SODIUM ACETATE, POTASSIUM CHLORIDE AND MAGNESIUM CHLORIDE 526; 502; 368; 37; 30 MG/100ML; MG/100ML; MG/100ML; MG/100ML; MG/100ML
INJECTION, SOLUTION INTRAVENOUS
Status: DISCONTINUED | OUTPATIENT
Start: 2023-09-15 | End: 2023-09-15 | Stop reason: SURG

## 2023-09-15 RX ORDER — PROTAMINE SULFATE 10 MG/ML
INJECTION, SOLUTION INTRAVENOUS PRN
Status: DISCONTINUED | OUTPATIENT
Start: 2023-09-15 | End: 2023-09-15 | Stop reason: SURG

## 2023-09-15 RX ORDER — POTASSIUM CHLORIDE 14.9 MG/ML
20 INJECTION INTRAVENOUS ONCE
Status: COMPLETED | OUTPATIENT
Start: 2023-09-16 | End: 2023-09-16

## 2023-09-15 RX ORDER — BISACODYL 10 MG
10 SUPPOSITORY, RECTAL RECTAL
Status: DISCONTINUED | OUTPATIENT
Start: 2023-09-15 | End: 2023-09-20 | Stop reason: HOSPADM

## 2023-09-15 RX ORDER — ALBUMIN, HUMAN INJ 5% 5 %
SOLUTION INTRAVENOUS PRN
Status: DISCONTINUED | OUTPATIENT
Start: 2023-09-15 | End: 2023-09-15 | Stop reason: SURG

## 2023-09-15 RX ORDER — TAMSULOSIN HYDROCHLORIDE 0.4 MG/1
0.4 CAPSULE ORAL
Status: DISCONTINUED | OUTPATIENT
Start: 2023-09-16 | End: 2023-09-20 | Stop reason: HOSPADM

## 2023-09-15 RX ORDER — OMEPRAZOLE 20 MG/1
20 CAPSULE, DELAYED RELEASE ORAL DAILY
Status: DISCONTINUED | OUTPATIENT
Start: 2023-09-16 | End: 2023-09-20 | Stop reason: HOSPADM

## 2023-09-15 RX ORDER — MIDAZOLAM HYDROCHLORIDE 1 MG/ML
INJECTION INTRAMUSCULAR; INTRAVENOUS PRN
Status: DISCONTINUED | OUTPATIENT
Start: 2023-09-15 | End: 2023-09-15 | Stop reason: SURG

## 2023-09-15 RX ORDER — NITROGLYCERIN 20 MG/100ML
0-100 INJECTION INTRAVENOUS CONTINUOUS
Status: DISCONTINUED | OUTPATIENT
Start: 2023-09-15 | End: 2023-09-17

## 2023-09-15 RX ORDER — ACETAMINOPHEN 325 MG/1
650 TABLET ORAL EVERY 4 HOURS PRN
Status: DISCONTINUED | OUTPATIENT
Start: 2023-09-15 | End: 2023-09-20 | Stop reason: HOSPADM

## 2023-09-15 RX ORDER — ALUMINA, MAGNESIA, AND SIMETHICONE 2400; 2400; 240 MG/30ML; MG/30ML; MG/30ML
30 SUSPENSION ORAL EVERY 4 HOURS PRN
Status: DISCONTINUED | OUTPATIENT
Start: 2023-09-15 | End: 2023-09-20 | Stop reason: HOSPADM

## 2023-09-15 RX ORDER — ONDANSETRON 2 MG/ML
8 INJECTION INTRAMUSCULAR; INTRAVENOUS EVERY 6 HOURS PRN
Status: DISCONTINUED | OUTPATIENT
Start: 2023-09-15 | End: 2023-09-20 | Stop reason: HOSPADM

## 2023-09-15 RX ORDER — EPHEDRINE SULFATE 50 MG/ML
INJECTION, SOLUTION INTRAVENOUS PRN
Status: DISCONTINUED | OUTPATIENT
Start: 2023-09-15 | End: 2023-09-15 | Stop reason: SURG

## 2023-09-15 RX ORDER — SODIUM CHLORIDE 9 MG/ML
INJECTION, SOLUTION INTRAVENOUS CONTINUOUS
Status: DISCONTINUED | OUTPATIENT
Start: 2023-09-15 | End: 2023-09-17

## 2023-09-15 RX ORDER — SODIUM CHLORIDE, SODIUM LACTATE, POTASSIUM CHLORIDE, CALCIUM CHLORIDE 600; 310; 30; 20 MG/100ML; MG/100ML; MG/100ML; MG/100ML
INJECTION, SOLUTION INTRAVENOUS
Status: DISCONTINUED | OUTPATIENT
Start: 2023-09-15 | End: 2023-09-15 | Stop reason: SURG

## 2023-09-15 RX ORDER — POTASSIUM CHLORIDE 14.9 MG/ML
20 INJECTION INTRAVENOUS ONCE
Status: COMPLETED | OUTPATIENT
Start: 2023-09-15 | End: 2023-09-15

## 2023-09-15 RX ORDER — SODIUM CHLORIDE 9 MG/ML
INJECTION, SOLUTION INTRAVENOUS CONTINUOUS
Status: DISCONTINUED | OUTPATIENT
Start: 2023-09-15 | End: 2023-09-20 | Stop reason: HOSPADM

## 2023-09-15 RX ORDER — CLOPIDOGREL BISULFATE 75 MG/1
75 TABLET ORAL DAILY
Status: DISCONTINUED | OUTPATIENT
Start: 2023-09-16 | End: 2023-09-20 | Stop reason: HOSPADM

## 2023-09-15 RX ORDER — PHENYLEPHRINE HCL IN 0.9% NACL 0.5 MG/5ML
SYRINGE (ML) INTRAVENOUS PRN
Status: DISCONTINUED | OUTPATIENT
Start: 2023-09-15 | End: 2023-09-15 | Stop reason: SURG

## 2023-09-15 RX ORDER — ACETAMINOPHEN 650 MG/1
650 SUPPOSITORY RECTAL EVERY 4 HOURS PRN
Status: DISCONTINUED | OUTPATIENT
Start: 2023-09-15 | End: 2023-09-20 | Stop reason: HOSPADM

## 2023-09-15 RX ORDER — INSULIN LISPRO 100 [IU]/ML
0-14 INJECTION, SOLUTION INTRAVENOUS; SUBCUTANEOUS
Status: DISPENSED | OUTPATIENT
Start: 2023-09-15 | End: 2023-09-16

## 2023-09-15 RX ORDER — TRAMADOL HYDROCHLORIDE 50 MG/1
50 TABLET ORAL EVERY 4 HOURS PRN
Status: DISCONTINUED | OUTPATIENT
Start: 2023-09-15 | End: 2023-09-20 | Stop reason: HOSPADM

## 2023-09-15 RX ORDER — MIDAZOLAM HYDROCHLORIDE 1 MG/ML
2 INJECTION INTRAMUSCULAR; INTRAVENOUS
Status: DISCONTINUED | OUTPATIENT
Start: 2023-09-15 | End: 2023-09-16

## 2023-09-15 RX ORDER — EPINEPHRINE HCL IN 0.9 % NACL 4MG/250ML
0-.5 PLASTIC BAG, INJECTION (ML) INTRAVENOUS CONTINUOUS
Status: DISCONTINUED | OUTPATIENT
Start: 2023-09-15 | End: 2023-09-15

## 2023-09-15 RX ORDER — DEXTROSE MONOHYDRATE 25 G/50ML
12.5-25 INJECTION, SOLUTION INTRAVENOUS PRN
Status: DISCONTINUED | OUTPATIENT
Start: 2023-09-15 | End: 2023-09-16

## 2023-09-15 RX ORDER — DEXMEDETOMIDINE HYDROCHLORIDE 4 UG/ML
0-1.5 INJECTION, SOLUTION INTRAVENOUS CONTINUOUS
Status: DISCONTINUED | OUTPATIENT
Start: 2023-09-15 | End: 2023-09-15

## 2023-09-15 RX ORDER — OXYCODONE HYDROCHLORIDE 10 MG/1
10 TABLET ORAL
Status: DISCONTINUED | OUTPATIENT
Start: 2023-09-15 | End: 2023-09-20 | Stop reason: HOSPADM

## 2023-09-15 RX ORDER — EPINEPHRINE HCL IN 0.9 % NACL 4MG/250ML
0-.5 PLASTIC BAG, INJECTION (ML) INTRAVENOUS CONTINUOUS
Status: DISCONTINUED | OUTPATIENT
Start: 2023-09-15 | End: 2023-09-17

## 2023-09-15 RX ORDER — METHADONE HYDROCHLORIDE 10 MG/ML
20 INJECTION, SOLUTION INTRAMUSCULAR; INTRAVENOUS; SUBCUTANEOUS ONCE
Status: COMPLETED | OUTPATIENT
Start: 2023-09-15 | End: 2023-09-15

## 2023-09-15 RX ORDER — NOREPINEPHRINE BITARTRATE 0.03 MG/ML
0-1 INJECTION, SOLUTION INTRAVENOUS CONTINUOUS
Status: DISCONTINUED | OUTPATIENT
Start: 2023-09-15 | End: 2023-09-17

## 2023-09-15 RX ORDER — POLYETHYLENE GLYCOL 3350 17 G/17G
1 POWDER, FOR SOLUTION ORAL DAILY
Status: DISCONTINUED | OUTPATIENT
Start: 2023-09-16 | End: 2023-09-20 | Stop reason: HOSPADM

## 2023-09-15 RX ORDER — ACETAMINOPHEN 500 MG
1000 TABLET ORAL ONCE
Status: COMPLETED | OUTPATIENT
Start: 2023-09-15 | End: 2023-09-15

## 2023-09-15 RX ORDER — VECURONIUM BROMIDE 1 MG/ML
INJECTION, POWDER, LYOPHILIZED, FOR SOLUTION INTRAVENOUS PRN
Status: DISCONTINUED | OUTPATIENT
Start: 2023-09-15 | End: 2023-09-15 | Stop reason: SURG

## 2023-09-15 RX ORDER — VANCOMYCIN HYDROCHLORIDE 1 G/20ML
INJECTION, POWDER, LYOPHILIZED, FOR SOLUTION INTRAVENOUS
Status: COMPLETED | OUTPATIENT
Start: 2023-09-15 | End: 2023-09-15

## 2023-09-15 RX ORDER — NOREPINEPHRINE BITARTRATE 0.03 MG/ML
0-1 INJECTION, SOLUTION INTRAVENOUS CONTINUOUS
Status: DISCONTINUED | OUTPATIENT
Start: 2023-09-15 | End: 2023-09-15

## 2023-09-15 RX ORDER — ACETAMINOPHEN 500 MG
1000 TABLET ORAL EVERY 6 HOURS
Status: COMPLETED | OUTPATIENT
Start: 2023-09-15 | End: 2023-09-20

## 2023-09-15 RX ORDER — ASPIRIN 81 MG/1
81 TABLET ORAL DAILY
Status: DISCONTINUED | OUTPATIENT
Start: 2023-09-16 | End: 2023-09-20 | Stop reason: HOSPADM

## 2023-09-15 RX ORDER — SODIUM CHLORIDE, SODIUM GLUCONATE, SODIUM ACETATE, POTASSIUM CHLORIDE AND MAGNESIUM CHLORIDE 526; 502; 368; 37; 30 MG/100ML; MG/100ML; MG/100ML; MG/100ML; MG/100ML
INJECTION, SOLUTION INTRAVENOUS PRN
Status: DISCONTINUED | OUTPATIENT
Start: 2023-09-15 | End: 2023-09-16

## 2023-09-15 RX ORDER — PROCHLORPERAZINE EDISYLATE 5 MG/ML
10 INJECTION INTRAMUSCULAR; INTRAVENOUS EVERY 6 HOURS PRN
Status: DISCONTINUED | OUTPATIENT
Start: 2023-09-15 | End: 2023-09-20 | Stop reason: HOSPADM

## 2023-09-15 RX ORDER — OXYCODONE HYDROCHLORIDE 5 MG/1
5 TABLET ORAL
Status: DISCONTINUED | OUTPATIENT
Start: 2023-09-15 | End: 2023-09-20 | Stop reason: HOSPADM

## 2023-09-15 RX ORDER — ENOXAPARIN SODIUM 100 MG/ML
40 INJECTION SUBCUTANEOUS DAILY
Status: DISCONTINUED | OUTPATIENT
Start: 2023-09-16 | End: 2023-09-20 | Stop reason: HOSPADM

## 2023-09-15 RX ORDER — ATORVASTATIN CALCIUM 40 MG/1
40 TABLET, FILM COATED ORAL
Status: DISCONTINUED | OUTPATIENT
Start: 2023-09-15 | End: 2023-09-20 | Stop reason: HOSPADM

## 2023-09-15 RX ORDER — ACETAMINOPHEN 500 MG
1000 TABLET ORAL EVERY 6 HOURS PRN
Status: DISCONTINUED | OUTPATIENT
Start: 2023-09-20 | End: 2023-09-20 | Stop reason: HOSPADM

## 2023-09-15 RX ORDER — LIDOCAINE HYDROCHLORIDE 20 MG/ML
INJECTION, SOLUTION EPIDURAL; INFILTRATION; INTRACAUDAL; PERINEURAL PRN
Status: DISCONTINUED | OUTPATIENT
Start: 2023-09-15 | End: 2023-09-15 | Stop reason: SURG

## 2023-09-15 RX ORDER — DEXMEDETOMIDINE HYDROCHLORIDE 4 UG/ML
0-1.5 INJECTION, SOLUTION INTRAVENOUS CONTINUOUS
Status: DISCONTINUED | OUTPATIENT
Start: 2023-09-15 | End: 2023-09-17

## 2023-09-15 RX ORDER — MORPHINE SULFATE 4 MG/ML
4 INJECTION INTRAVENOUS
Status: DISCONTINUED | OUTPATIENT
Start: 2023-09-15 | End: 2023-09-16

## 2023-09-15 RX ORDER — CEFAZOLIN SODIUM 1 G/3ML
INJECTION, POWDER, FOR SOLUTION INTRAMUSCULAR; INTRAVENOUS PRN
Status: DISCONTINUED | OUTPATIENT
Start: 2023-09-15 | End: 2023-09-15 | Stop reason: SURG

## 2023-09-15 RX ORDER — HEPARIN SODIUM,PORCINE 1000/ML
VIAL (ML) INJECTION PRN
Status: DISCONTINUED | OUTPATIENT
Start: 2023-09-15 | End: 2023-09-15 | Stop reason: SURG

## 2023-09-15 RX ORDER — AMOXICILLIN 250 MG
2 CAPSULE ORAL 2 TIMES DAILY
Status: DISCONTINUED | OUTPATIENT
Start: 2023-09-15 | End: 2023-09-20 | Stop reason: HOSPADM

## 2023-09-15 RX ADMIN — MAGNESIUM SULFATE HEPTAHYDRATE 1 G: 1 INJECTION, SOLUTION INTRAVENOUS at 14:47

## 2023-09-15 RX ADMIN — POTASSIUM CHLORIDE 20 MEQ: 14.9 INJECTION, SOLUTION INTRAVENOUS at 17:56

## 2023-09-15 RX ADMIN — ACETAMINOPHEN 1000 MG: 500 TABLET, FILM COATED ORAL at 17:21

## 2023-09-15 RX ADMIN — POTASSIUM CHLORIDE 20 MEQ: 14.9 INJECTION, SOLUTION INTRAVENOUS at 14:45

## 2023-09-15 RX ADMIN — Medication 1 APPLICATOR: at 14:45

## 2023-09-15 RX ADMIN — POTASSIUM CHLORIDE 20 MEQ: 14.9 INJECTION, SOLUTION INTRAVENOUS at 23:58

## 2023-09-15 RX ADMIN — SODIUM CHLORIDE, SODIUM GLUCONATE, SODIUM ACETATE, POTASSIUM CHLORIDE AND MAGNESIUM CHLORIDE: 526; 502; 368; 37; 30 INJECTION, SOLUTION INTRAVENOUS at 09:20

## 2023-09-15 RX ADMIN — HEPARIN SODIUM 38000 UNITS: 1000 INJECTION, SOLUTION INTRAVENOUS; SUBCUTANEOUS at 09:41

## 2023-09-15 RX ADMIN — EPHEDRINE SULFATE 10 MG: 50 INJECTION, SOLUTION INTRAVENOUS at 09:48

## 2023-09-15 RX ADMIN — MIDAZOLAM 1 MG: 1 INJECTION, SOLUTION INTRAMUSCULAR; INTRAVENOUS at 07:50

## 2023-09-15 RX ADMIN — CLEVIPIDINE 100 MCG: 0.5 EMULSION INTRAVENOUS at 12:39

## 2023-09-15 RX ADMIN — PROPOFOL 50 MG: 10 INJECTION, EMULSION INTRAVENOUS at 08:00

## 2023-09-15 RX ADMIN — PROPOFOL 50 MG: 10 INJECTION, EMULSION INTRAVENOUS at 07:59

## 2023-09-15 RX ADMIN — VECURONIUM BROMIDE 4 MG: 1 INJECTION, POWDER, LYOPHILIZED, FOR SOLUTION INTRAVENOUS at 07:58

## 2023-09-15 RX ADMIN — Medication 100 MCG: at 12:46

## 2023-09-15 RX ADMIN — Medication 1 APPLICATOR: at 21:08

## 2023-09-15 RX ADMIN — METHADONE HYDROCHLORIDE 20 MG: 10 INJECTION, SOLUTION INTRAMUSCULAR; INTRAVENOUS; SUBCUTANEOUS at 08:06

## 2023-09-15 RX ADMIN — Medication 100 MCG: at 13:12

## 2023-09-15 RX ADMIN — Medication 100 MCG: at 10:28

## 2023-09-15 RX ADMIN — SODIUM CHLORIDE, SODIUM GLUCONATE, SODIUM ACETATE, POTASSIUM CHLORIDE AND MAGNESIUM CHLORIDE: 526; 502; 368; 37; 30 INJECTION, SOLUTION INTRAVENOUS at 15:20

## 2023-09-15 RX ADMIN — EPHEDRINE SULFATE 10 MG: 50 INJECTION, SOLUTION INTRAVENOUS at 08:54

## 2023-09-15 RX ADMIN — LIDOCAINE HYDROCHLORIDE 0.5 ML: 10 INJECTION, SOLUTION EPIDURAL; INFILTRATION; INTRACAUDAL at 05:58

## 2023-09-15 RX ADMIN — Medication 200 MCG: at 13:03

## 2023-09-15 RX ADMIN — AMINOCAPROIC ACID 9.87 G: 250 INJECTION, SOLUTION INTRAVENOUS at 09:43

## 2023-09-15 RX ADMIN — CLEVIPIDINE 100 MCG: 0.5 EMULSION INTRAVENOUS at 13:19

## 2023-09-15 RX ADMIN — VECURONIUM BROMIDE 12 MG: 1 INJECTION, POWDER, LYOPHILIZED, FOR SOLUTION INTRAVENOUS at 07:59

## 2023-09-15 RX ADMIN — PROTAMINE SULFATE 300 MG: 10 INJECTION, SOLUTION INTRAVENOUS at 12:38

## 2023-09-15 RX ADMIN — SODIUM CHLORIDE 13 UNITS/HR: 9 INJECTION, SOLUTION INTRAVENOUS at 19:40

## 2023-09-15 RX ADMIN — PROPOFOL 50 MG: 10 INJECTION, EMULSION INTRAVENOUS at 07:58

## 2023-09-15 RX ADMIN — DEXMEDETOMIDINE 2.5 MCG/KG/HR: 100 INJECTION, SOLUTION INTRAVENOUS at 09:43

## 2023-09-15 RX ADMIN — Medication 200 MCG: at 13:04

## 2023-09-15 RX ADMIN — EPHEDRINE SULFATE 10 MG: 50 INJECTION, SOLUTION INTRAVENOUS at 10:21

## 2023-09-15 RX ADMIN — EPINEPHRINE 0.05 MCG/KG/MIN: 1 INJECTION INTRAMUSCULAR; INTRAVENOUS; SUBCUTANEOUS at 12:13

## 2023-09-15 RX ADMIN — SENNOSIDES AND DOCUSATE SODIUM 2 TABLET: 50; 8.6 TABLET ORAL at 17:20

## 2023-09-15 RX ADMIN — VECURONIUM BROMIDE 4 MG: 1 INJECTION, POWDER, LYOPHILIZED, FOR SOLUTION INTRAVENOUS at 09:55

## 2023-09-15 RX ADMIN — SODIUM CHLORIDE: 9 INJECTION, SOLUTION INTRAVENOUS at 14:43

## 2023-09-15 RX ADMIN — VECURONIUM BROMIDE 5 MG: 1 INJECTION, POWDER, LYOPHILIZED, FOR SOLUTION INTRAVENOUS at 10:21

## 2023-09-15 RX ADMIN — EPHEDRINE SULFATE 10 MG: 50 INJECTION, SOLUTION INTRAVENOUS at 09:18

## 2023-09-15 RX ADMIN — CEFAZOLIN 2 G: 1 INJECTION, POWDER, FOR SOLUTION INTRAMUSCULAR; INTRAVENOUS at 12:39

## 2023-09-15 RX ADMIN — SODIUM CHLORIDE, SODIUM GLUCONATE, SODIUM ACETATE, POTASSIUM CHLORIDE AND MAGNESIUM CHLORIDE: 526; 502; 368; 37; 30 INJECTION, SOLUTION INTRAVENOUS at 07:39

## 2023-09-15 RX ADMIN — SODIUM CHLORIDE: 9 INJECTION, SOLUTION INTRAVENOUS at 14:15

## 2023-09-15 RX ADMIN — SODIUM CHLORIDE, POTASSIUM CHLORIDE, SODIUM LACTATE AND CALCIUM CHLORIDE: 600; 310; 30; 20 INJECTION, SOLUTION INTRAVENOUS at 08:20

## 2023-09-15 RX ADMIN — CEFAZOLIN 2 G: 1 INJECTION, POWDER, FOR SOLUTION INTRAMUSCULAR; INTRAVENOUS at 08:31

## 2023-09-15 RX ADMIN — TRAMADOL HYDROCHLORIDE 50 MG: 50 TABLET ORAL at 18:34

## 2023-09-15 RX ADMIN — FENTANYL CITRATE 50 MCG: 50 INJECTION, SOLUTION INTRAMUSCULAR; INTRAVENOUS at 19:38

## 2023-09-15 RX ADMIN — LIDOCAINE HYDROCHLORIDE 100 MG: 20 INJECTION, SOLUTION EPIDURAL; INFILTRATION; INTRACAUDAL at 07:58

## 2023-09-15 RX ADMIN — ATORVASTATIN CALCIUM 40 MG: 40 TABLET, FILM COATED ORAL at 21:08

## 2023-09-15 RX ADMIN — SODIUM CHLORIDE 2 UNITS/HR: 9 INJECTION, SOLUTION INTRAVENOUS at 08:35

## 2023-09-15 RX ADMIN — AMINOCAPROIC ACID 2 G/HR: 250 INJECTION, SOLUTION INTRAVENOUS at 10:14

## 2023-09-15 RX ADMIN — Medication 200 MCG: at 12:56

## 2023-09-15 RX ADMIN — NOREPINEPHRINE BITARTRATE 0.1 MCG/KG/MIN: 1 INJECTION, SOLUTION, CONCENTRATE INTRAVENOUS at 13:16

## 2023-09-15 RX ADMIN — OXYCODONE HYDROCHLORIDE 10 MG: 10 TABLET ORAL at 17:20

## 2023-09-15 RX ADMIN — ALBUMIN (HUMAN) 250 ML: 2.5 SOLUTION INTRAVENOUS at 13:28

## 2023-09-15 RX ADMIN — ACETAMINOPHEN 1000 MG: 500 TABLET, FILM COATED ORAL at 05:57

## 2023-09-15 RX ADMIN — EPHEDRINE SULFATE 10 MG: 50 INJECTION, SOLUTION INTRAVENOUS at 10:10

## 2023-09-15 RX ADMIN — CEFAZOLIN 2 G: 2 INJECTION, POWDER, FOR SOLUTION INTRAMUSCULAR; INTRAVENOUS at 20:29

## 2023-09-15 RX ADMIN — SODIUM CHLORIDE, SODIUM GLUCONATE, SODIUM ACETATE, POTASSIUM CHLORIDE AND MAGNESIUM CHLORIDE: 526; 502; 368; 37; 30 INJECTION, SOLUTION INTRAVENOUS at 20:33

## 2023-09-15 RX ADMIN — MIDAZOLAM 1 MG: 1 INJECTION, SOLUTION INTRAMUSCULAR; INTRAVENOUS at 07:58

## 2023-09-15 ASSESSMENT — PAIN DESCRIPTION - PAIN TYPE
TYPE: ACUTE PAIN;SURGICAL PAIN
TYPE: SURGICAL PAIN
TYPE: ACUTE PAIN;SURGICAL PAIN

## 2023-09-15 ASSESSMENT — FIBROSIS 4 INDEX: FIB4 SCORE: 1.04

## 2023-09-15 ASSESSMENT — PULMONARY FUNCTION TESTS: FVC: 1.2

## 2023-09-15 NOTE — ASSESSMENT & PLAN NOTE
Status post platelet transfusion Intra-Op for oozing - mild drop in Hgb today  Daily CBC with conservative transfusion strategy

## 2023-09-15 NOTE — ANESTHESIA PROCEDURE NOTES
Airway    Date/Time: 9/15/2023 8:01 AM    Performed by: Lexx Reaves M.D.  Authorized by: Lexx Reaves M.D.    Location:  OR  Urgency:  Elective  Difficult Airway: No    Indications for Airway Management:  Anesthesia      Spontaneous Ventilation: absent    Sedation Level:  Deep  Preoxygenated: Yes    Patient Position:  Sniffing  Mask Difficulty Assessment:  1 - vent by mask  Final Airway Type:  Endotracheal airway  Final Endotracheal Airway:  ETT  Cuffed: Yes    Technique Used for Successful ETT Placement:  Direct laryngoscopy  Devices/Methods Used in Placement:  Intubating stylet and anterior pressure/BURP    Insertion Site:  Oral  Blade Type:  Kody  Laryngoscope Blade/Videolaryngoscope Blade Size:  3  ETT Size (mm):  8.5  Measured from:  Teeth  ETT to Teeth (cm):  25  Placement Verified by: auscultation and capnometry    Cormack-Lehane Classification:  Grade IIa - partial view of glottis  Number of Attempts at Approach:  1

## 2023-09-15 NOTE — ASSESSMENT & PLAN NOTE
Secondary to pancreatitis 6 months ago - uncontrolled glucose  Increase lantus to 15 units qam, aggressive ISS  Diabetic diet

## 2023-09-15 NOTE — ANESTHESIA PREPROCEDURE EVALUATION
Case: [id number] Date/Time: 09/15/23 0715    Procedures:       CORONARY ARTERY BYPASS GRAFTING X3, ENDOSCOPIC VEIN HARVEST, TRANSESOPHAGEAL ECHOCARDIOGRAM      ECHOCARDIOGRAM, TRANSESOPHAGEAL, INTRAOPERATIVE    Pre-op diagnosis: CORONARY ARTERY DISEASE    Location: [facility] 03 / SURGERY [facility]    Surgeons: Lizet Mann M.D.          Relevant Problems   ANESTHESIA   (positive) NARCISO (obstructive sleep apnea)      PULMONARY   (positive) Shortness of breath      CARDIAC   (positive) Coronary artery disease of native artery with stable angina pectoris (HCC)   (positive) Hypertension   (positive) NSTEMI (non-ST elevated myocardial infarction) ([facility])   (positive) Unstable angina (HCC)      Other   (positive) Hyperlipidemia   (positive) Ischemic cardiomyopathy   (positive) Memory problem   (positive) Pre-diabetes   (positive) Spells of decreased attentiveness       Physical Exam    Airway   Mallampati: II  TM distance: >3 FB  Neck ROM: full       Cardiovascular - normal exam  Rhythm: regular  Rate: normal  (-) murmur     Dental - normal exam        Facial Hair   Pulmonary - normal exam  Breath sounds clear to auscultation     Abdominal    Neurological - normal exam                 Anesthesia Plan    ASA 4   ASA physical status 4 criteria: ongoing cardiac ischemia or unstable angina    Plan - general       Airway plan will be ETT  ANSHUL Planned      Plan Factors:   Patient did not smoke on day of procedure.      Induction: intravenous    Postoperative Plan: Postoperative administration of opioids is intended.    Pertinent diagnostic labs and testing reviewed    Informed Consent:    Anesthetic plan and risks discussed with patient.    Use of blood products discussed with: patient whom consented to blood products.

## 2023-09-15 NOTE — ANESTHESIA PROCEDURE NOTES
Arterial Line    Performed by: Lexx Reaves M.D.  Authorized by: Lexx Reaves M.D.    Start Time:  9/15/2023 7:53 AM  End Time:  9/15/2023 7:56 AM  Localization: ultrasound guidance and surface landmarks    Patient Location:  OR  Indication: continuous blood pressure monitoring and blood sampling needed        Catheter Size:  20 G  Seldinger Technique?: Yes    Laterality:  Left  Site:  Radial artery  Line Secured:  Antimicrobial disc, tape and transparent dressing  Events: patient tolerated procedure well with no complications

## 2023-09-15 NOTE — OP REPORT
OPERATIVE NOTE   Yadiel Michaels [place]   09/15/23              PRE-OP DIAGNOSIS: multivessel CAD including chronic total occlusion of RCA and circumflex arteries with history of NSTEMI currently with stable angina, HTN, HLD, DM2 secondary to previous pancreatitis, overweight, NARCISO            POST-OP DIAGNOSIS: same            PROCEDURE:Coronary artery bypass grafting x3  Left internal mammary artery to left anterior descending artery  Reversed saphenous vein graft to obtuse marginal artery  Reversed saphenous vein graft to distal right coronary artery  Right coronary artery endarterectomy  Endo-vein procurement           SURGEON: Dr. Lizet Mann    FIRST ASSISTANT/ VEIN PROCUREMENT: KAMI Bermudez            ANESTHESIA: General endotracheal, Dr. Lexx Reaves    CARDIOPULMONARY BYPASS TIME: 123 minutes    AORTIC CROSSCLAMP TIME: 99 minutes                  DRAINS: mediastinal 24F jamir, 32F mediastinal chest tubes x2            SPECIMENS: none     FINDINGS: good targets of circumflex and left anterior descending territories, circumferential occlusive plaque in distal right coronary artery necessitating endarterectomy, preserved LVEF, good venous conduit, medium LIMA with great flow           COMPLICATIONS: none identified            DISPOSITION: ICU            CONDITION: intubated, hemodynamically stable             Procedure details:      The patient was taken to the operating room. Standard monitoring lines and Bhandari catheter were placed. General anesthesia was induced. The patient was prepped and draped in sterile fashion. An endoscopic procurement of the greater saphenous vein in the right leg was carried out by KAMI Bermudez. Standard median sternotomy was performed. The left internal mammary artery was taken down between cautery and clips. The patient was heparinized and the vessel divided distally. The heart was exposed and pericardial traction sutures placed. The distal aortic and triple stage right  atrial venous cannulation was performed. Antegrade cardioplegia catheter was placed. The patient was placed on cardiopulmonary bypass. The aorta was cross-clamped and the heart arrested with [place] cardioplegia. Myocardial protection was maintained with topical cooling.  The first graft was the reversed saphenous vein to the distal right coronary artery. The artery at the chosen grafting site was exposed and entered. The target vessel was examined and circumferential occlusive plaque was noted. The arteriotomy was extended and endarterectomy was performed with successful removal of plaque. The lumen was open both distally and proximally. Then the anastomosis was performed in end-to-side fashion with 7-0 prolene suture. The second graft was the reversed saphenous vein to the obtuse marginal artery. The artery at the chosen grafting site was exposed and entered. Then the anastomosis was performed in end-to-side fashion with 7-0 prolene suture. The last graft was the left internal mammary artery to the left anterior descending artery. The artery at the chosen grafting site was exposed and entered. Then the anastomosis was performed in end-to-side fashion with 7-0 prolene suture.The pedicle was secured to the epicardium with two 6-0 Prolene sutures. The proximal anastomoses were done in end-to-side fashion utilizing single clamp technique.   The heart was allowed 10 minutes to re-perfuse and ANSHUL was used to verify appropriate wall motion. The patient was then weaned and  from cardiopulmonary bypass. Protamine was given to reverse the heparin. The heart was decannulated. Ventricular pacing wires were placed. The chest tubes were placed. Hemostasis was secured then the sternum was closed using stainless steel wires. The incision was closed in three layers with sutures. Sterile dressings were placed. At the end of the operation, all sponge and needle counts were correct.

## 2023-09-15 NOTE — ANESTHESIA PROCEDURE NOTES
Peripheral IV    Date/Time: 9/15/2023 8:05 AM    Performed by: Lexx Reaves M.D.  Authorized by: Lexx Reaves M.D.    Size:  16 G  Laterality:  Left  Peripheral IV Location:  Forearm  Local Anesthetic:  None  Site Prep:  Alcohol  Technique:  Direct puncture  Attempts:  1

## 2023-09-15 NOTE — ANESTHESIA PROCEDURE NOTES
Central Venous Line    Performed by: Lexx Reaves M.D.  Authorized by: Lexx Reaves M.D.    Start Time:  9/15/2023 8:10 AM  End Time:  9/15/2023 8:20 AM  Patient Location:  OR  Indication: central venous access and hemodynamic monitoring        provider hand hygiene performed prior to central venous catheter insertion, all 5 sterile barriers used (gloves, gown, cap, mask, large sterile drape) during central venous catheter insertion and skin prep agent completely dried prior to procedure    Patient Position:  Trendelenburg  Laterality:  Left  Site:  Subclavian  Prep:  Chlorhexidine  Catheter Size:  7 Fr  Catheter Length (cm):  20  Number of Lumens:  Triple lumen  target vein identified, needle advanced into vein and blood aspirated and guidewire advanced into vein    Seldinger Technique?: Yes    Ultrasound-Guided: surface landmarks    Intravenous Verification: venous blood return and chest x-ray pending    all ports aspirated, all ports flushed easily, guidewire was removed intact, biopatch was applied, line was sutured in place and dressing was applied    Events: patient tolerated procedure well with no complications

## 2023-09-15 NOTE — PROGRESS NOTES
Patient arrived to unit at 1410 with Dr. Reaves and OR team. Dr. Gonda at bedside. Report received, lines drips verified. Vitals stable on norepi gtt, insulin, dex - see MAR.

## 2023-09-15 NOTE — ASSESSMENT & PLAN NOTE
Status post CABGx3 with intraoperative ANSHUL 9/15/2023  Continue routine postoperative management  Removal of mediastinal chest tubes today  Strict blood pressure management  As needed analgesics  ASA, statin, BB

## 2023-09-15 NOTE — CONSULTS
Critical Care Consultation    Date of consult: 9/15/2023    Referring Physician  Lizet Mann M.D.    Reason for Consultation  Perioperative critical care post CABG    History of Presenting Illness  67 y.o. male who presented 9/15/2023 with a past medical history significant for hypertension, hyperlipidemia, obesity, obstructive sleep apnea, ischemic cardiomyopathy, pancreatitis with insulin insufficiency (HgbA1c >10) and CAD who was referred by cardiology to Dr. Lizet Mann for severe multivessel disease seen on cardiac catheterization on 8/22/2023 which was performed due to stable angina.  Decision was made to take the patient today for an elective three-vessel CABG and intraoperative ANSHUL.   Per anesthesia report at bedside: The three-vessel CABG was performed without complications.  Patient did not require pacing coming off the pump.  He received platelet transfusion intraoperative for some oozing and received 2 L of crystalloid and 250 cc of albumin.  He is brought to the ICU on a Precedex drip at 0.5, insulin drip at 8 units an hour with his last glucose of 290 and a norepinephrine drip at 0.03.  Of note, his glucose was difficult to control throughout the case.  He has a preserved ejection fraction of 60% on ANSHUL.  He was an easy intubation with an 8.5 endotracheal tube at 25 cm at the teeth with a grade 2A view and has a left subclavian central venous catheter and a left radial arterial catheter in place.  His endotracheal tube was pulled back after noting that it was too deep on chest imaging.    Code Status  Full Code    Review of Systems  Review of Systems   Unable to perform ROS: Intubated       Past Medical History   has a past medical history of Allergic rhinitis, Anesthesia, Back pain, Bowel habit changes (09/07/2023), Cataract, Chickenpox, Kiswahili measles, Heart burn, High cholesterol, lithotripsy, Hyperlipidemia, Hypertension, Influenza, Mumps, Myocardial infarct (HCC) (10/2022), Nasal  drainage, Obesity, Pancreatitis (10/09/2022), Psychiatric problem (09/07/2023), Pulmonary hypertension (HCC), Sleep apnea (2015), Snoring, Tonsillitis, and Urinary urgency (09/07/2023).    Surgical History   has a past surgical history that includes [id number]; tonsillectomy; nasal reconstruction; other orthopedic surgery (Right, 1988); other orthopedic surgery (Right, 2009); umbilical hernia repair (02/23/2015); and us gastrointestional endoscopic.    Family History  family history includes Heart Attack in his father; Heart Disease in his father; Sleep Apnea in his father.    Social History   reports that he has never smoked. He has never been exposed to tobacco smoke. He has never used smokeless tobacco. He reports that he does not currently use alcohol. He reports that he does not use drugs.    Medications  Home Medications       Reviewed by Noe Umana (Pharmacy Tech) on 09/15/23 at 0658  Med List Status: Complete     Medication Last Dose Status   acetaminophen (TYLENOL) 500 MG Tab unk Active   aspirin 81 MG EC tablet 2d ago Active   atorvastatin (LIPITOR) 40 MG Tab 9/13/2023 Active   cetirizine (ZYRTEC) 10 MG Tab 9/15/2023 Active   metoprolol tartrate (LOPRESSOR) 25 MG Tab 9/15/2023 Active   omeprazole (PRILOSEC) 20 MG delayed-release capsule 9/15/2023 Active   Pancrelipase, Lip-Prot-Amyl, (CREON) 39832-267581 units Cap DR Particles 9/15/2023 Active   tamsulosin (FLOMAX) 0.4 MG capsule 9/15/2023 Active                  Current Facility-Administered Medications   Medication Dose Route Frequency Provider Last Rate Last Admin    lidocaine (Xylocaine) 1 % injection 0.5 mL  0.5 mL Intradermal Once PRN Lizet Mann M.D.   0.5 mL at 09/15/23 0558    insulin regular (Humulin R) 100 Units in  mL Infusion  1-6 Units/hr Intravenous Continuous Lizet Mann M.D. 4 mL/hr at 09/15/23 0954 4 Units/hr at 09/15/23 0954    EPINEPHrine (Adrenalin) infusion 4 mg/250 mL (premix)  0-0.5 mcg/kg/min (Ideal)  Intravenous Continuous Lizet Mann M.D.        norepinephrine (Levophed) 8 mg in 250 mL NS infusion (premix)  0-1 mcg/kg/min (Ideal) Intravenous Continuous Lizet Mann M.D.        dexmedetomidine (PRECEDEX) 400 mcg/100mL NS premix infusion  0-1.5 mcg/kg/hr (Ideal) Intravenous Continuous Lizet Mann M.D. 44.188 mL/hr at 09/15/23 0943 2.5 mcg/kg/hr at 09/15/23 0943    metoprolol tartrate (Lopressor) tablet 12.5 mg  12.5 mg Oral Once Lizet Mann M.D.        methadone 10 mg/mL (Dolophine) injection 20 mg  20 mg Intravenous Once Lizet Mann M.D.        aminocaproic acid (Amicar) 9.87 g in  mL IV Bolus  100 mg/kg Intravenous Once Lizet Mann M.D.   9.87 g at 09/15/23 0943    aminocaproic acid (Amicar) 5 g in  mL Infusion  2 g/hr Intravenous Once Lizet Mann M.D.        ceFAZolin (Ancef) 2 g in  mL IVPB  2 g Intravenous Once Lizet Mann M.D.        papaverine injection    Intra-Op Once PRN Lizet Mann M.D.   30 mg at 09/15/23 0720    heparin 2,000 Units in electrolyte-A (Plasmalyte-A) 1,000 mL    Intra-Op Once PRN Lizet Mann M.D.   2,000 Units at 09/15/23 0720     Facility-Administered Medications Ordered in Other Encounters   Medication Dose Route Frequency Provider Last Rate Last Admin    ePHEDrine injection   Intravenous PRN Lexx Reaves M.D.   10 mg at 09/15/23 0948    electrolyte-A (Plasmalyte-A) infusion   Intravenous Intra-Op Continuous Lexx Reaves M.D.   New Bag at 09/15/23 0920    propofol (DIPRIVAN) injection   Intravenous PRN Lexx Reaves M.D.   50 mg at 09/15/23 0800    lidocaine PF (Xylocaine-MPF) 2 % injection PF   Intravenous PRN eLxx Reaves M.D.   100 mg at 09/15/23 0758    vecuronium (Norcuron) injection   Intravenous PRN Lexx Reaves M.D.   12 mg at 09/15/23 0759    midazolam (Versed) injection   Intravenous PRN Lexx Reaves M.D.   1 mg at 09/15/23 0758    ceFAZolin (Ancef) injection   Intravenous PRN Lexx Reaves M.D.    2 g at 09/15/23 0831    heparin OR USE ONLY   Intravenous PRN Lexx Reaves M.D.   38,000 Units at 09/15/23 0941       Allergies  No Known Allergies    Vital Signs last 24 hours  Temp:  [36.2 °C (97.2 °F)] 36.2 °C (97.2 °F)  Pulse:  [55] 55  Resp:  [14] 14  BP: (117-123)/(73-75) 123/75  SpO2:  [91 %] 91 %    Physical Exam  Physical Exam  Vitals and nursing note reviewed.   Constitutional:       General: He is in acute distress.      Appearance: He is overweight. He is not toxic-appearing.      Interventions: He is sedated and intubated.   HENT:      Head: Normocephalic and atraumatic.      Nose: Nose normal. No congestion.      Mouth/Throat:      Mouth: Mucous membranes are moist.      Comments: Endotracheal tube in place  Eyes:      General: No scleral icterus.     Conjunctiva/sclera: Conjunctivae normal.      Pupils: Pupils are equal, round, and reactive to light.   Neck:      Comments: Right IJ central venous catheter without surrounding hematoma  Cardiovascular:      Rate and Rhythm: Normal rate and regular rhythm. Occasional Extrasystoles are present.     Chest Wall: PMI is displaced.      Pulses: Decreased pulses.      Heart sounds: Heart sounds are distant. No murmur heard.     Comments: Sternotomy incision is clean/dry/intact with mediastinal chest tubes and Jc tube, epicardial wires in place, left subclavian central venous catheter without surrounding hematoma  Pulmonary:      Effort: No tachypnea or accessory muscle usage. He is intubated.      Breath sounds: Examination of the right-lower field reveals rales. Examination of the left-lower field reveals rales. Rales present. No wheezing or rhonchi.      Comments: % support with PEEP of 8, 100% FiO2 with good compliance  Abdominal:      General: Bowel sounds are normal. There is no distension.      Palpations: Abdomen is soft.      Tenderness: There is no abdominal tenderness. There is no guarding or rebound.   Genitourinary:     Comments: Elisabet  catheter in place  Musculoskeletal:         General: No tenderness.      Cervical back: Neck supple. No rigidity.      Right lower leg: No edema.      Left lower leg: No edema.      Comments: Left radial arterial catheter in place with intact distal CMS, vein harvest site clean/dry/intact   Skin:     General: Skin is warm and dry.      Capillary Refill: Capillary refill takes 2 to 3 seconds.      Coloration: Skin is not pale.   Neurological:      Comments: Heavily sedated postanesthetic   Psychiatric:      Comments: Unable to assess given current clinical condition         Fluids    Intake/Output Summary (Last 24 hours) at 9/15/2023 1002  Last data filed at 9/15/2023 0920  Gross per 24 hour   Intake 800 ml   Output 450 ml   Net 350 ml       Laboratory  Recent Results (from the past 48 hour(s))   COD - Adult (Type and Screen)    Collection Time: 09/15/23  6:00 AM   Result Value Ref Range    ABO Grouping Only O     Rh Grouping Only POS     Antibody Screen-Cod NEG        Imaging  DX-CHEST-PORTABLE (1 VIEW)   Final Result      1.  Status post median sternotomy with placement of mediastinal drains.   2.  Interval placement of an endotracheal tube which terminates slightly low slightly less than 2 cm above the level of the gabriel.   3.  Interval insertion of a central venous catheter which terminates with the tip projecting over the expected region of the mid superior vena cava.   4.  Mild interstitial edema. There   5.  Left basilar atelectasis and/or consolidation.       *Personally reviewed chest x-ray showing: Expected postoperative operative changes including enlarged cardiac and mediastinal silhouettes, low lung volumes, sternotomy wires, endotracheal tube is 1.9 cm from the gabriel, left subclavian central venous catheter mediastinal and Jc chest tubes are in good position    Assessment/Plan  * Coronary artery disease of native artery with stable angina pectoris (HCC)- (present on admission)  Assessment &  Plan  Status post CABGx3 with intraoperative ANSHUL 9/15/2023  Routine postoperative management  Monitor mediastinal chest tube output, monitor for arrhythmias  Strict blood pressure management  As needed analgesics  Eventual antiplatelet when appropriate  Titrate norepinephrine drip to goal mean arterial pressure greater than 65  Insulin infusion for tight glycemic control in the post-operative setting    Encounter for weaning from ventilator (HCC)  Assessment & Plan  Intubated intraoperatively 9/15/2023  Continue full mechanical ventilatory support using ASV mode titrating per protocol  Titrate FiO2 to goal SPO2 greater than 92%  RT/O2 protocol  Dexmedetomidine infusion titrating to adequate sedation  ABCDEF bundle  Eventual diuresis    Ischemic cardiomyopathy- (present on admission)  Assessment & Plan  Preserved EF of 60% today  Hold heart failure medications    Pre-diabetes- (present on admission)  Assessment & Plan  Secondary to pancreatitis 6 months ago  Strict blood glucose control postoperatively with initially insulin drip followed by insulin sliding scale  Diabetic diet  Will likely need long and short acting insulin given hemoglobin A1c greater than 10    NARCISO (obstructive sleep apnea)- (present on admission)  Assessment & Plan  Nocturnal CPAP once extubated    Obesity- (present on admission)  Assessment & Plan  Encourage behavioral and dietary modification for weight loss    Hyperlipidemia- (present on admission)  Assessment & Plan  Continue atorvastatin    Hypertension- (present on admission)  Assessment & Plan  Currently hypotensive  Hold scheduled antihypertensives and monitor, goal systolic blood pressure less than 120    Acute blood loss as cause of postoperative anemia  Assessment & Plan  Status post platelet transfusion Intra-Op for oozing  Serial CBC with conservative transfusion strategy  Monitor chest tube output        Discussed patient condition and risk of morbidity and/or mortality with RN, RT,  Pharmacy, Charge nurse / hot rounds, Patient, CVS, and anesthesiology .    The patient remains critically ill.  Critical care time = 37 minutes in directly providing and coordinating critical care and extensive data review.  No time overlap and excludes procedures.    Please note that this dictation was created using voice recognition software. I have made every reasonable attempt to correct obvious errors, but there may be errors of grammar and possibly content that I did not discover before finalizing the note.

## 2023-09-15 NOTE — INTERVAL H&P NOTE
H&P reviewed. The patient was examined and there are no changes to the H&P      Carotid US:  CONCLUSIONS   No prior exams.   Bilateral.    Mild plaque of the carotid bifurcation. Doppler velocities are normal    throughout the carotid arteries without evidence of hemodynamically    significant stenosis.

## 2023-09-15 NOTE — ANESTHESIA PROCEDURE NOTES
ANSHUL    Date/Time: 9/15/2023 8:36 AM    Performed by: Lexx Reaves M.D.  Authorized by: Lexx Reaves M.D.    Start Time:9/15/2023 8:36 AM  Preanesthetic Checklist: patient identified, IV checked, site marked, risks and benefits discussed, surgical consent, monitors and equipment checked, pre-op evaluation and timeout performed    Indication for ANSHUL: diagnostic and monitoring   Patient Location: OR  Intubated: Yes  Bite Block: Yes  Heart Visualized: Yes  Insertion: atraumatic    **See FULL ANSHUL report in patient's chart via CV [facility]**

## 2023-09-15 NOTE — ASSESSMENT & PLAN NOTE
Intubated intraoperatively 9/15/2023 - extubated POD 0 per protocol  Titrate O2 therapy to goal SPO2 greater than 92%  RT/O2 protocol  Encourage incentive spirometry, mobilization

## 2023-09-15 NOTE — ANESTHESIA TIME REPORT
Anesthesia Start and Stop Event Times     Date Time Event    9/15/2023 0703 Ready for Procedure     0739 Anesthesia Start     1412 Anesthesia Stop        Responsible Staff  09/15/23    Name Role Begin End    Lexx Reaves M.D. Anesth 0739 1412        Overtime Reason:  no overtime (within assigned shift)    Comments:

## 2023-09-16 ENCOUNTER — APPOINTMENT (OUTPATIENT)
Dept: RADIOLOGY | Facility: MEDICAL CENTER | Age: 68
DRG: 231 | End: 2023-09-16
Attending: NURSE PRACTITIONER
Payer: MEDICARE

## 2023-09-16 PROBLEM — E83.51 HYPOCALCEMIA: Status: ACTIVE | Noted: 2023-09-16

## 2023-09-16 LAB
ANION GAP SERPL CALC-SCNC: 9 MMOL/L (ref 7–16)
BUN SERPL-MCNC: 14 MG/DL (ref 8–22)
CALCIUM SERPL-MCNC: 8.2 MG/DL (ref 8.5–10.5)
CHLORIDE SERPL-SCNC: 110 MMOL/L (ref 96–112)
CO2 SERPL-SCNC: 21 MMOL/L (ref 20–33)
CREAT SERPL-MCNC: 0.82 MG/DL (ref 0.5–1.4)
EKG IMPRESSION: NORMAL
ERYTHROCYTE [DISTWIDTH] IN BLOOD BY AUTOMATED COUNT: 41 FL (ref 35.9–50)
GFR SERPLBLD CREATININE-BSD FMLA CKD-EPI: 96 ML/MIN/1.73 M 2
GLUCOSE BLD STRIP.AUTO-MCNC: 106 MG/DL (ref 65–99)
GLUCOSE BLD STRIP.AUTO-MCNC: 109 MG/DL (ref 65–99)
GLUCOSE BLD STRIP.AUTO-MCNC: 111 MG/DL (ref 65–99)
GLUCOSE BLD STRIP.AUTO-MCNC: 118 MG/DL (ref 65–99)
GLUCOSE BLD STRIP.AUTO-MCNC: 118 MG/DL (ref 65–99)
GLUCOSE BLD STRIP.AUTO-MCNC: 125 MG/DL (ref 65–99)
GLUCOSE BLD STRIP.AUTO-MCNC: 127 MG/DL (ref 65–99)
GLUCOSE BLD STRIP.AUTO-MCNC: 130 MG/DL (ref 65–99)
GLUCOSE BLD STRIP.AUTO-MCNC: 149 MG/DL (ref 65–99)
GLUCOSE BLD STRIP.AUTO-MCNC: 165 MG/DL (ref 65–99)
GLUCOSE BLD STRIP.AUTO-MCNC: 166 MG/DL (ref 65–99)
GLUCOSE BLD STRIP.AUTO-MCNC: 183 MG/DL (ref 65–99)
GLUCOSE BLD STRIP.AUTO-MCNC: 188 MG/DL (ref 65–99)
GLUCOSE BLD STRIP.AUTO-MCNC: 197 MG/DL (ref 65–99)
GLUCOSE BLD STRIP.AUTO-MCNC: 199 MG/DL (ref 65–99)
GLUCOSE BLD STRIP.AUTO-MCNC: 244 MG/DL (ref 65–99)
GLUCOSE BLD STRIP.AUTO-MCNC: 248 MG/DL (ref 65–99)
GLUCOSE BLD STRIP.AUTO-MCNC: 264 MG/DL (ref 65–99)
GLUCOSE BLD STRIP.AUTO-MCNC: 272 MG/DL (ref 65–99)
GLUCOSE BLD STRIP.AUTO-MCNC: 285 MG/DL (ref 65–99)
GLUCOSE BLD STRIP.AUTO-MCNC: 290 MG/DL (ref 65–99)
GLUCOSE BLD STRIP.AUTO-MCNC: 95 MG/DL (ref 65–99)
GLUCOSE BLD STRIP.AUTO-MCNC: 99 MG/DL (ref 65–99)
GLUCOSE SERPL-MCNC: 110 MG/DL (ref 65–99)
HCT VFR BLD AUTO: 27.2 % (ref 42–52)
HGB BLD-MCNC: 9.9 G/DL (ref 14–18)
MCH RBC QN AUTO: 31.2 PG (ref 27–33)
MCHC RBC AUTO-ENTMCNC: 36.4 G/DL (ref 32.3–36.5)
MCV RBC AUTO: 85.8 FL (ref 81.4–97.8)
PLATELET # BLD AUTO: 152 K/UL (ref 164–446)
PMV BLD AUTO: 11 FL (ref 9–12.9)
POTASSIUM SERPL-SCNC: 4 MMOL/L (ref 3.6–5.5)
POTASSIUM SERPL-SCNC: 4.3 MMOL/L (ref 3.6–5.5)
RBC # BLD AUTO: 3.17 M/UL (ref 4.7–6.1)
SODIUM SERPL-SCNC: 140 MMOL/L (ref 135–145)
WBC # BLD AUTO: 10.3 K/UL (ref 4.8–10.8)

## 2023-09-16 PROCEDURE — 700102 HCHG RX REV CODE 250 W/ 637 OVERRIDE(OP): Performed by: INTERNAL MEDICINE

## 2023-09-16 PROCEDURE — 84132 ASSAY OF SERUM POTASSIUM: CPT

## 2023-09-16 PROCEDURE — 99291 CRITICAL CARE FIRST HOUR: CPT | Performed by: INTERNAL MEDICINE

## 2023-09-16 PROCEDURE — 700101 HCHG RX REV CODE 250: Performed by: NURSE PRACTITIONER

## 2023-09-16 PROCEDURE — 99024 POSTOP FOLLOW-UP VISIT: CPT | Performed by: NURSE PRACTITIONER

## 2023-09-16 PROCEDURE — 700105 HCHG RX REV CODE 258: Performed by: NURSE PRACTITIONER

## 2023-09-16 PROCEDURE — 85027 COMPLETE CBC AUTOMATED: CPT

## 2023-09-16 PROCEDURE — 93005 ELECTROCARDIOGRAM TRACING: CPT | Performed by: NURSE PRACTITIONER

## 2023-09-16 PROCEDURE — 700102 HCHG RX REV CODE 250 W/ 637 OVERRIDE(OP): Performed by: THORACIC SURGERY (CARDIOTHORACIC VASCULAR SURGERY)

## 2023-09-16 PROCEDURE — 700111 HCHG RX REV CODE 636 W/ 250 OVERRIDE (IP): Mod: JZ | Performed by: NURSE PRACTITIONER

## 2023-09-16 PROCEDURE — 700102 HCHG RX REV CODE 250 W/ 637 OVERRIDE(OP): Performed by: NURSE PRACTITIONER

## 2023-09-16 PROCEDURE — 770022 HCHG ROOM/CARE - ICU (200)

## 2023-09-16 PROCEDURE — 82962 GLUCOSE BLOOD TEST: CPT | Mod: 91

## 2023-09-16 PROCEDURE — 700111 HCHG RX REV CODE 636 W/ 250 OVERRIDE (IP): Mod: JZ,JG | Performed by: NURSE PRACTITIONER

## 2023-09-16 PROCEDURE — 80048 BASIC METABOLIC PNL TOTAL CA: CPT

## 2023-09-16 PROCEDURE — A9270 NON-COVERED ITEM OR SERVICE: HCPCS | Performed by: NURSE PRACTITIONER

## 2023-09-16 PROCEDURE — P9045 ALBUMIN (HUMAN), 5%, 250 ML: HCPCS | Mod: JZ,JG | Performed by: NURSE PRACTITIONER

## 2023-09-16 PROCEDURE — 93010 ELECTROCARDIOGRAM REPORT: CPT | Performed by: INTERNAL MEDICINE

## 2023-09-16 PROCEDURE — 71045 X-RAY EXAM CHEST 1 VIEW: CPT

## 2023-09-16 PROCEDURE — 94669 MECHANICAL CHEST WALL OSCILL: CPT

## 2023-09-16 RX ORDER — ALBUMIN, HUMAN INJ 5% 5 %
25 SOLUTION INTRAVENOUS ONCE
Status: COMPLETED | OUTPATIENT
Start: 2023-09-16 | End: 2023-09-17

## 2023-09-16 RX ORDER — INSULIN LISPRO 100 [IU]/ML
0.2 INJECTION, SOLUTION INTRAVENOUS; SUBCUTANEOUS
Status: DISCONTINUED | OUTPATIENT
Start: 2023-09-16 | End: 2023-09-17

## 2023-09-16 RX ORDER — DEXTROSE MONOHYDRATE 25 G/50ML
25 INJECTION, SOLUTION INTRAVENOUS
Status: DISCONTINUED | OUTPATIENT
Start: 2023-09-16 | End: 2023-09-17

## 2023-09-16 RX ORDER — INSULIN LISPRO 100 [IU]/ML
2-9 INJECTION, SOLUTION INTRAVENOUS; SUBCUTANEOUS
Status: DISCONTINUED | OUTPATIENT
Start: 2023-09-16 | End: 2023-09-17

## 2023-09-16 RX ADMIN — DEXMEDETOMIDINE 0.4 MCG/KG/HR: 100 INJECTION, SOLUTION INTRAVENOUS at 00:21

## 2023-09-16 RX ADMIN — INSULIN LISPRO 7 UNITS: 100 INJECTION, SOLUTION INTRAVENOUS; SUBCUTANEOUS at 17:15

## 2023-09-16 RX ADMIN — OXYCODONE HYDROCHLORIDE 10 MG: 10 TABLET ORAL at 16:17

## 2023-09-16 RX ADMIN — METOPROLOL TARTRATE 12.5 MG: 25 TABLET, FILM COATED ORAL at 17:12

## 2023-09-16 RX ADMIN — ACETAMINOPHEN 1000 MG: 500 TABLET, FILM COATED ORAL at 17:12

## 2023-09-16 RX ADMIN — SENNOSIDES AND DOCUSATE SODIUM 2 TABLET: 50; 8.6 TABLET ORAL at 05:30

## 2023-09-16 RX ADMIN — ATORVASTATIN CALCIUM 40 MG: 40 TABLET, FILM COATED ORAL at 21:00

## 2023-09-16 RX ADMIN — Medication 1 APPLICATOR: at 09:00

## 2023-09-16 RX ADMIN — POLYETHYLENE GLYCOL 3350 1 PACKET: 17 POWDER, FOR SOLUTION ORAL at 05:28

## 2023-09-16 RX ADMIN — INSULIN GLARGINE-YFGN 10 UNITS: 100 INJECTION, SOLUTION SUBCUTANEOUS at 12:26

## 2023-09-16 RX ADMIN — ENOXAPARIN SODIUM 40 MG: 100 INJECTION SUBCUTANEOUS at 17:12

## 2023-09-16 RX ADMIN — OXYCODONE HYDROCHLORIDE 10 MG: 10 TABLET ORAL at 08:12

## 2023-09-16 RX ADMIN — MAGNESIUM SULFATE HEPTAHYDRATE 1 G: 1 INJECTION, SOLUTION INTRAVENOUS at 05:05

## 2023-09-16 RX ADMIN — CLOPIDOGREL BISULFATE 75 MG: 75 TABLET ORAL at 05:30

## 2023-09-16 RX ADMIN — ACETAMINOPHEN 1000 MG: 500 TABLET, FILM COATED ORAL at 12:20

## 2023-09-16 RX ADMIN — ASPIRIN 81 MG: 81 TABLET, COATED ORAL at 05:30

## 2023-09-16 RX ADMIN — OXYCODONE HYDROCHLORIDE 10 MG: 10 TABLET ORAL at 04:40

## 2023-09-16 RX ADMIN — ALBUMIN (HUMAN) 25 G: 12.5 SOLUTION INTRAVENOUS at 08:12

## 2023-09-16 RX ADMIN — OXYCODONE HYDROCHLORIDE 10 MG: 10 TABLET ORAL at 12:20

## 2023-09-16 RX ADMIN — OMEPRAZOLE 20 MG: 20 CAPSULE, DELAYED RELEASE ORAL at 05:30

## 2023-09-16 RX ADMIN — ACETAMINOPHEN 1000 MG: 500 TABLET, FILM COATED ORAL at 05:29

## 2023-09-16 RX ADMIN — TAMSULOSIN HYDROCHLORIDE 0.4 MG: 0.4 CAPSULE ORAL at 08:12

## 2023-09-16 RX ADMIN — OXYCODONE HYDROCHLORIDE 5 MG: 5 TABLET ORAL at 21:00

## 2023-09-16 RX ADMIN — ACETAMINOPHEN 1000 MG: 500 TABLET, FILM COATED ORAL at 00:13

## 2023-09-16 RX ADMIN — SENNOSIDES AND DOCUSATE SODIUM 2 TABLET: 50; 8.6 TABLET ORAL at 17:12

## 2023-09-16 RX ADMIN — INSULIN LISPRO 3 UNITS: 100 INJECTION, SOLUTION INTRAVENOUS; SUBCUTANEOUS at 17:13

## 2023-09-16 RX ADMIN — INSULIN LISPRO 5 UNITS: 100 INJECTION, SOLUTION INTRAVENOUS; SUBCUTANEOUS at 21:03

## 2023-09-16 RX ADMIN — Medication 1 APPLICATOR: at 21:00

## 2023-09-16 ASSESSMENT — ENCOUNTER SYMPTOMS
DIZZINESS: 0
VOMITING: 0
BLURRED VISION: 0
NERVOUS/ANXIOUS: 0
COUGH: 1
SHORTNESS OF BREATH: 0
BRUISES/BLEEDS EASILY: 0
SPEECH CHANGE: 0
DEPRESSION: 0
ABDOMINAL PAIN: 0
FEVER: 0
HEADACHES: 0
SORE THROAT: 0
SPUTUM PRODUCTION: 0
CHILLS: 0
DIARRHEA: 0
NAUSEA: 0
SENSORY CHANGE: 0
MYALGIAS: 0
PALPITATIONS: 0
BACK PAIN: 0

## 2023-09-16 ASSESSMENT — PAIN DESCRIPTION - PAIN TYPE
TYPE: ACUTE PAIN
TYPE: ACUTE PAIN;SURGICAL PAIN
TYPE: ACUTE PAIN
TYPE: ACUTE PAIN;SURGICAL PAIN
TYPE: ACUTE PAIN
TYPE: ACUTE PAIN;SURGICAL PAIN
TYPE: ACUTE PAIN

## 2023-09-16 ASSESSMENT — COPD QUESTIONNAIRES
DURING THE PAST 4 WEEKS HOW MUCH DID YOU FEEL SHORT OF BREATH: NONE/LITTLE OF THE TIME
HAVE YOU SMOKED AT LEAST 100 CIGARETTES IN YOUR ENTIRE LIFE: NO/DON'T KNOW
COPD SCREENING SCORE: 2
DO YOU EVER COUGH UP ANY MUCUS OR PHLEGM?: NO/ONLY WITH OCCASIONAL COLDS OR INFECTIONS

## 2023-09-16 ASSESSMENT — FIBROSIS 4 INDEX: FIB4 SCORE: 1.23

## 2023-09-16 NOTE — THERAPY
Occupational Therapy Contact Note    Patient Name: Yadiel Moody  Age:  67 y.o., Sex:  male  Medical Record #: [id number]  Today's Date: 9/16/2023    OT consult rec'd. Pt is POD #1; per order, will hold OT eval and initiate as appropriate/able after pt reaches POD #2.

## 2023-09-16 NOTE — PROGRESS NOTES
Cardiovascular Surgery Progress Note    Name: Yadiel Moody  MRN: [id number]  : 1955  Admit Date: 9/15/2023  5:01 AM  1 Day Post-Op     Procedure:  Procedure(s) and Anesthesia Type:     * Coronary artery bypass grafting x3  Left internal mammary artery to left anterior descending artery  Reversed saphenous vein graft to obtuse marginal artery  Reversed saphenous vein graft to distal right coronary artery  Right coronary artery endarterectomy  Endo-vein procurement    Vitals:  Vitals:    23 0540 23 0545 23 0600 23 0615   BP: 100/64 100/64  99/61   Pulse: 72 73 66 68   Resp:       Temp:   36.7 °C (98.1 °F)    TempSrc:   Bladder    SpO2: 92% 95% 95% 94%   Weight:       Height:          Temp (24hrs), Av.4 °C (97.6 °F), Min:36.1 °C (97 °F), Max:36.7 °C (98.1 °F)      Respiratory:  Vent Mode: ASV, PEEP/CPAP: 8, PIP: 24, MAP: 12 Respiration: 13, Pulse Oximetry: 94 %       Fluids:    Intake/Output Summary (Last 24 hours) at 2023 0727  Last data filed at 2023 0605  Gross per 24 hour   Intake 8609.66 ml   Output 3089 ml   Net 5520.66 ml     Admit weight: Weight: 96.5 kg (212 lb 11.9 oz)  Current weight: Weight: 104 kg (228 lb 2.8 oz) (23 0515)    Labs:  Recent Labs     09/15/23  1410 23  0150   WBC  --  10.3   RBC  --  3.17*   HEMOGLOBIN 11.3* 9.9*   HEMATOCRIT 31.0* 27.2*   MCV  --  85.8   MCH  --  31.2   MCHC  --  36.4   RDW  --  41.0   PLATELETCT 149* 152*   MPV  --  11.0     Recent Labs     09/15/23  2300 23  0150 23  0500   SODIUM  --  140  --    POTASSIUM 3.6 4.0 4.3   CHLORIDE  --  110  --    CO2  --  21  --    GLUCOSE  --  110*  --    BUN  --  14  --    CREATININE  --  0.82  --    CALCIUM  --  8.2*  --      Recent Labs     09/15/23  1410   APTT 42.3*   INR 1.30*           Medications:  Scheduled Medications   Medication Dose Frequency    insulin lispro  0-14 Units TID AC    Nozin nasal  swab  1 Applicator BID    magnesium sulfate  1 g  DAILY    K+ Scale: Goal of 4.5  1 Each Q6HRS    metoprolol tartrate  12.5 mg BID    Followed by    [START ON 9/17/2023] metoprolol tartrate  25 mg BID    aspirin  81 mg DAILY    clopidogrel  75 mg DAILY    atorvastatin  40 mg QHS    Pharmacy Consult Request  1 Each PHARMACY TO DOSE    acetaminophen  1,000 mg Q6HRS    senna-docusate  2 Tablet BID    And    polyethylene glycol/lytes  1 Packet DAILY    And    [START ON 9/17/2023] magnesium hydroxide  30 mL DAILY    omeprazole  20 mg DAILY    enoxaparin (LOVENOX) injection  40 mg DAILY AT 1800    tamsulosin  0.4 mg AFTER BREAKFAST        Exam:   Physical Exam  Vitals and nursing note reviewed.   Constitutional:       General: He is not in acute distress.     Appearance: Normal appearance.   HENT:      Head: Normocephalic.      Right Ear: External ear normal.      Left Ear: External ear normal.      Nose: Nose normal. No congestion.      Mouth/Throat:      Mouth: Mucous membranes are moist.      Pharynx: Oropharynx is clear.   Eyes:      Extraocular Movements: Extraocular movements intact.      Pupils: Pupils are equal, round, and reactive to light.   Cardiovascular:      Rate and Rhythm: Normal rate and regular rhythm.      Pulses: Normal pulses.      Heart sounds: Normal heart sounds.   Pulmonary:      Effort: Pulmonary effort is normal.      Breath sounds: Decreased breath sounds present.   Abdominal:      General: Bowel sounds are decreased. There is no distension.      Palpations: Abdomen is soft.   Musculoskeletal:      Cervical back: Normal range of motion and neck supple. No tenderness.      Right lower leg: Edema present.      Left lower leg: Edema present.   Skin:     General: Skin is warm and dry.      Comments: Midline surgical incision w prevena. EVH site   Neurological:      General: No focal deficit present.      Mental Status: He is alert and oriented to person, place, and time. Mental status is at baseline.   Psychiatric:         Mood and Affect: Mood  normal.         Behavior: Behavior normal.         Thought Content: Thought content normal.         Cardiac Medications:    ASA - Yes    Plavix - Yes    Post-operative Beta Blockers - No; contraindicated because of Hypotension    Ace/ARB- No; contraindicated because of Hypotension and Normal EF    Statin - Yes    Aldactone- No; contraindicated because of Normal EF    SGLT2i-  No contraindicated because of hypotension    Ejection Fraction:  65%    Telemetry:   9/16 SR    Assessment/Plan:  POD 1  On norepi, SR, neuro intact, prevena in place, abdomen soft, fluid balance positive, wt up,  3L NC. 266 mL out of chest tubes overnight. Plan:  Keep chest tubes and pacing wires. Albumin this AM, wean norepi as able, IS/ambulate.    Disposition:  TBD

## 2023-09-16 NOTE — PROGRESS NOTES
Critical Care Progress Note    Date of admission  9/15/2023    Chief Complaint  67 y.o. male admitted 9/15/2023 with CAD s/p CABG    Hospital Course  67 y.o. male who presented 9/15/2023 with a past medical history significant for hypertension, hyperlipidemia, obesity, obstructive sleep apnea, ischemic cardiomyopathy, pancreatitis with insulin insufficiency (HgbA1c >10) and CAD who was referred by cardiology to Dr. Lizet Mann for severe multivessel disease seen on cardiac catheterization on 8/22/2023 which was performed due to stable angina.  Decision was made to take the patient today for an elective three-vessel CABG and intraoperative ANSHUL.              Per anesthesia report at bedside: The three-vessel CABG was performed without complications.  Patient did not require pacing coming off the pump.  He received platelet transfusion intraoperative for some oozing and received 2 L of crystalloid and 250 cc of albumin.  He is brought to the ICU on a Precedex drip at 0.5, insulin drip at 8 units an hour with his last glucose of 290 and a norepinephrine drip at 0.03.  Of note, his glucose was difficult to control throughout the case.  He has a preserved ejection fraction of 60% on ANSHUL.  He was an easy intubation with an 8.5 endotracheal tube at 25 cm at the teeth with a grade 2A view and has a left subclavian central venous catheter and a left radial arterial catheter in place.  His endotracheal tube was pulled back after noting that it was too deep on chest imaging.    Interval Problem Update  Reviewed last 24 hour events:   - remains on insulin gtt @ 2 units/hr   - levophed gtt   - extubated per protocol   - AF, nl WBC   - AAOx4, pain controlled   - NSR, SBP    - CVP 0-8   - Hgb down to 10   - plts up to 152   - low Ca    Review of Systems  Review of Systems   Constitutional:  Positive for malaise/fatigue. Negative for chills and fever.   HENT:  Negative for congestion and sore throat.    Eyes:  Negative for  blurred vision.   Respiratory:  Positive for cough. Negative for sputum production and shortness of breath.    Cardiovascular:  Positive for chest pain. Negative for palpitations and leg swelling.   Gastrointestinal:  Negative for abdominal pain, diarrhea, nausea and vomiting.   Genitourinary:  Negative for dysuria.   Musculoskeletal:  Negative for back pain and myalgias.   Skin:  Negative for rash.   Neurological:  Negative for dizziness, sensory change, speech change and headaches.   Endo/Heme/Allergies:  Does not bruise/bleed easily.   Psychiatric/Behavioral:  Negative for depression. The patient is not nervous/anxious.    All other systems reviewed and are negative.       Vital Signs for last 24 hours   Temp:  [36.1 °C (97 °F)-36.7 °C (98.1 °F)] 36.7 °C (98.1 °F)  Pulse:  [60-80] 68  Resp:  [11-25] 13  BP: ()/(45-68) 99/61  SpO2:  [89 %-97 %] 94 %    Hemodynamic parameters for last 24 hours  CVP:  [0 MM HG-12 MM HG] 0 MM HG    Respiratory Information for the last 24 hours  Vent Mode: ASV  PEEP/CPAP: 8  MAP: 12  Control VTE (exp VT): 546 --> extubated 9/15 to 3 lpm n/c    Physical Exam   Physical Exam  Vitals and nursing note reviewed.   Constitutional:       General: He is awake. He is not in acute distress.     Appearance: He is well-developed and overweight.      Interventions: Nasal cannula in place.      Comments: Sitting up in chair this morning   HENT:      Head: Normocephalic and atraumatic.      Nose: Nose normal. No congestion.      Mouth/Throat:      Mouth: Mucous membranes are moist.      Pharynx: Oropharynx is clear. No oropharyngeal exudate.   Eyes:      General: No scleral icterus.     Conjunctiva/sclera: Conjunctivae normal.      Pupils: Pupils are equal, round, and reactive to light.   Neck:      Vascular: No JVD.      Comments: Right IJ central venous catheter without surrounding hematoma  Cardiovascular:      Rate and Rhythm: Normal rate and regular rhythm. Occasional Extrasystoles are  present.     Chest Wall: PMI is displaced.      Pulses: Decreased pulses.           Radial pulses are 1+ on the right side and 1+ on the left side.      Heart sounds: Heart sounds are distant. No murmur heard.     Comments: Persistent hypotension requiring low-dose vasopressor drip, sternotomy incision is clean/dry/intact with mediastinal chest tubes and epicardial pacing wires in place  Pulmonary:      Effort: Pulmonary effort is normal. No tachypnea or respiratory distress.      Breath sounds: No stridor. Examination of the right-lower field reveals rales. Examination of the left-lower field reveals rales. Rales present. No wheezing.      Comments: Speaking in full sentences without difficulty, attempting incentive spirometry well  Abdominal:      General: Bowel sounds are normal. There is no distension.      Palpations: Abdomen is soft.      Tenderness: There is no abdominal tenderness. There is no guarding or rebound.   Genitourinary:     Comments: Bhandari catheter in place  Musculoskeletal:         General: No tenderness.      Cervical back: Neck supple. No tenderness.      Right lower leg: No edema.      Left lower leg: No edema.   Skin:     General: Skin is warm and dry.      Capillary Refill: Capillary refill takes less than 2 seconds.      Coloration: Skin is not pale.   Neurological:      General: No focal deficit present.      Mental Status: He is alert and oriented to person, place, and time.      Cranial Nerves: No cranial nerve deficit.      Sensory: No sensory deficit.   Psychiatric:         Mood and Affect: Mood normal.         Behavior: Behavior normal. Behavior is cooperative.         Thought Content: Thought content normal.         Medications  Current Facility-Administered Medications   Medication Dose Route Frequency Provider Last Rate Last Admin    insulin lispro (HumaLOG,AdmeLOG) injection  0-14 Units Subcutaneous TID AC Lizet Mann M.D.        insulin regular (Humulin R) 100 Units in NS  100 mL Infusion  0-29 Units/hr Intravenous Continuous Lizet Mann M.D. 3 mL/hr at 09/16/23 0619 3 Units/hr at 09/16/23 0619    dextrose 50% (D50W) injection 12.5-25 g  12.5-25 g Intravenous PRN Lizet Mann M.D.        MD Alert...Pharmacy to initiate transition from insulin infusion to subcutaneous insulin for cardiothoracic surgery 1 Each  1 Each Other Continuous Lizet Mann M.D.        norepinephrine (Levophed) 8 mg in 250 mL NS infusion (premix)  0-1 mcg/kg/min (Ideal) Intravenous Continuous Lizet Mann M.D. 4 mL/hr at 09/16/23 0419 0.03 mcg/kg/min at 09/16/23 0419    EPINEPHrine (Adrenalin) infusion 4 mg/250 mL (premix)  0-0.5 mcg/kg/min (Ideal) Intravenous Continuous Lizet Mann M.D.   Dose not Required at 09/15/23 1415    Respiratory Therapy Consult   Nebulization Continuous RT Lc Delgado A.P.R.N.        NS infusion   Intravenous Continuous Lc Delgado A.P.R.N. 10 mL/hr at 09/15/23 1912 Rate Verify at 09/15/23 1912    NS infusion   Intravenous Continuous Lc Delgado, A.P.R.N.   Stopped at 09/16/23 0413    Nozin nasal  swab  1 Applicator Each Nostril BID Lc Delgado A.P.R.N.   1 Applicator at 09/15/23 2108    magnesium sulfate in D5W IVPB premix 1 g  1 g Intravenous DAILY Lc Delgado A.P.R.N.   Stopped at 09/16/23 0605    K+ Scale: Goal of 4.5  1 Each Intravenous Q6HRS Lc Delgado A.P.R.N.   1 Each at 09/15/23 2300    metoprolol tartrate (Lopressor) tablet 12.5 mg  12.5 mg Oral BID Lc Delgado A.P.R.N.        Followed by    [START ON 9/17/2023] metoprolol tartrate (Lopressor) tablet 25 mg  25 mg Oral BID Lc Delgado A.P.R.N.        aspirin EC tablet 81 mg  81 mg Oral DAILY Lc Delgado, A.P.R.N.   81 mg at 09/16/23 0530    clopidogrel (Plavix) tablet 75 mg  75 mg Oral DAILY Lc Delgado, A.P.R.N.   75 mg at 09/16/23 0530    atorvastatin (Lipitor) tablet 40 mg  40 mg Oral QHS Lc Delgado, A.P.R.N.   40 mg at 09/15/23 2108    clevidipine (Cleviprex) IV  emulsion  0-21 mg/hr Intravenous Continuous Lc Delgado, A.P.R.N.   Dose not Required at 09/15/23 1415    nitroglycerin 50 mg in D5W 250 ml infusion  0-100 mcg/min Intravenous Continuous Lc Delgado, A.P.R.N.   Dose not Required at 09/15/23 1415    Pharmacy Consult Request ...Pain Management Review 1 Each  1 Each Other PHARMACY TO DOSE Lc Delgado, A.P.R.N.        acetaminophen (Tylenol) tablet 1,000 mg  1,000 mg Oral Q6HRS Lc Delgado, A.P.R.N.   1,000 mg at 09/16/23 0529    Followed by    [START ON 9/20/2023] acetaminophen (Tylenol) tablet 1,000 mg  1,000 mg Oral Q6HRS PRN Lc Delgado, A.P.R.N.        oxyCODONE immediate-release (Roxicodone) tablet 5 mg  5 mg Oral Q3HRS PRN Lc Delgado, A.P.R.N.        Or    oxyCODONE immediate release (Roxicodone) tablet 10 mg  10 mg Oral Q3HRS PRN Lc Delgado, A.P.R.N.   10 mg at 09/16/23 0440    Or    fentaNYL (Sublimaze) injection 50 mcg  50 mcg Intravenous Q3HRS PRN Lc Delgado, A.P.R.N.   50 mcg at 09/15/23 1938    traMADol (Ultram) 50 MG tablet 50 mg  50 mg Oral Q4HRS PRN Lc Delgado, A.P.R.N.   50 mg at 09/15/23 1834    dexmedetomidine (PRECEDEX) 400 mcg/100mL NS premix infusion  0-1.5 mcg/kg/hr (Ideal) Intravenous Continuous Lc Delgado, A.P.R.N.   Stopped at 09/16/23 0258    ondansetron (Zofran) syringe/vial injection 8 mg  8 mg Intravenous Q6HRS PRN Lc Delgado, A.P.R.N.        Or    prochlorperazine (Compazine) injection 10 mg  10 mg Intravenous Q6HRS PRN Lc Delgado, A.P.R.N.        acetaminophen (Tylenol) tablet 650 mg  650 mg Oral Q4HRS PRN Lc Delgado A.P.R.N.        Or    acetaminophen (Tylenol) suppository 650 mg  650 mg Rectal Q4HRS PRN Lc Delgado A.P.R.N.        senna-docusate (Pericolace Or Senokot S) 8.6-50 MG per tablet 2 Tablet  2 Tablet Oral BID SHAUNA Tolentino.P.R.N.   2 Tablet at 09/16/23 0530    And    polyethylene glycol/lytes (Miralax) PACKET 1 Packet  1 Packet Oral DAILY SHAUNA Tolentino.P.R.N.   1 Packet at 09/16/23  0528    And    [START ON 9/17/2023] magnesium hydroxide (Milk Of Magnesia) suspension 30 mL  30 mL Oral DAILY Lc Delgado, A.P.R.N.        And    bisacodyl (Dulcolax) suppository 10 mg  10 mg Rectal QDAY PRN Lc Delgado, A.P.R.N.        omeprazole (PriLOSEC) capsule 20 mg  20 mg Oral DAILY Lc Delgado A.P.R.N.   20 mg at 09/16/23 0530    mag hydrox-al hydrox-simeth (Maalox Plus Es Or Mylanta Ds) suspension 30 mL  30 mL Oral Q4HRS PRN Lc Delgado, A.P.R.N.        diphenhydrAMINE (Benadryl) tablet/capsule 25 mg  25 mg Oral HS PRN - MR X 1 Lc Delgado, A.P.R.N.        enoxaparin (Lovenox) inj 40 mg  40 mg Subcutaneous DAILY AT 1800 Lc Delgado, A.P.R.N.        tamsulosin (Flomax) capsule 0.4 mg  0.4 mg Oral AFTER BREAKFAST Lc Delgado, A.P.R.N.           Fluids    Intake/Output Summary (Last 24 hours) at 9/16/2023 0642  Last data filed at 9/16/2023 0605  Gross per 24 hour   Intake 8609.66 ml   Output 3089 ml   Net 5520.66 ml       Laboratory  Recent Labs     09/15/23  1415 09/15/23  1505 09/15/23  1606   ISTATAPH 7.327* 7.327* 7.318*   ISTATAPCO2 42.0* 40.1* 40.2*   ISTATAPO2 141* 82 92*   ISTATATCO2 23 22 22   [id number] 99 95 96   ISTATARTHCO3 22.0 21.0 20.6   ISTATARTBE -4 -5* -5*   ISTATTEMP 35.8 C 36.0 C 36.3 C   ISTATFIO2 100 60  --    ISTATSPEC Arterial Arterial Arterial   ISTATAPHTC 7.345* 7.341* 7.328*   YKECRUNR2HI 134* 76 88*         Recent Labs     09/15/23  1410 09/15/23  1700 09/15/23  2300 09/16/23  0150 09/16/23  0500   SODIUM  --   --   --  140  --    POTASSIUM 3.7   < > 3.6 4.0 4.3   CHLORIDE  --   --   --  110  --    CO2  --   --   --  21  --    BUN  --   --   --  14  --    CREATININE  --   --   --  0.82  --    MAGNESIUM 2.9*  --   --   --   --    CALCIUM  --   --   --  8.2*  --     < > = values in this interval not displayed.     Recent Labs     09/16/23  0150   GLUCOSE 110*     Recent Labs     09/16/23  0150   WBC 10.3     Recent Labs     09/15/23  1410 09/16/23  0150   RBC  --   3.17*   HEMOGLOBIN 11.3* 9.9*   HEMATOCRIT 31.0* 27.2*   PLATELETCT 149* 152*   PROTHROMBTM 16.4*  --    APTT 42.3*  --    INR 1.30*  --        Imaging  X-Ray:  I have personally reviewed the images and compared with prior images. and My impression is: Minimal bibasilar atelectasis versus edema with enlarged cardiac and mediastinal silhouettes, sternotomy wires, left subclavian central venous catheter as well as mediastinal and Jc chest tubes are in good position    Assessment/Plan  * Coronary artery disease of native artery with stable angina pectoris (HCC)- (present on admission)  Assessment & Plan  Status post CABGx3 with intraoperative ANSHUL 9/15/2023  Continue routine postoperative management  Monitor mediastinal chest tube output, monitor for arrhythmias  Strict blood pressure management  As needed analgesics  ASA, statin  Continue to titrate norepinephrine drip to goal mean arterial pressure greater than 65, holding BB while hypotensive  Continue insulin infusion for tight glycemic control in the post-operative setting    Encounter for weaning from ventilator ([facility])  Assessment & Plan  Intubated intraoperatively 9/15/2023 - extubated POD 0 per protocol  Titrate O2 therapy to goal SPO2 greater than 92%  RT/O2 protocol  Encourage incentive spirometry, mobilization    Ischemic cardiomyopathy- (present on admission)  Assessment & Plan  Preserved EF of 60% today  Hold heart failure medications    Pre-diabetes- (present on admission)  Assessment & Plan  Secondary to pancreatitis 6 months ago  Continue strict blood glucose control postoperatively with initially insulin drip followed by insulin sliding scale  Diabetic diet  Will likely need long and short acting insulin given hemoglobin A1c greater than 10    NARCISO (obstructive sleep apnea)- (present on admission)  Assessment & Plan  Nocturnal CPAP    Obesity- (present on admission)  Assessment & Plan  Encourage behavioral and dietary modification for weight  loss    Hyperlipidemia- (present on admission)  Assessment & Plan  Continue atorvastatin    Hypertension- (present on admission)  Assessment & Plan  Currently hypotensive  Hold scheduled antihypertensives and monitor, goal systolic blood pressure less than 120    Hypocalcemia  Assessment & Plan  Replete and monitor    Acute blood loss as cause of postoperative anemia  Assessment & Plan  Status post platelet transfusion Intra-Op for oozing -improving  Daily CBC with conservative transfusion strategy  Monitor chest tube output         VTE:  Lovenox  Ulcer: Not Indicated  Lines: Central Line  Ongoing indication addressed, Arterial Line  Ongoing indication addressed, and Bhandari Catheter  Ongoing indication addressed    I have performed a physical exam and reviewed and updated ROS and Plan today (9/16/2023). In review of yesterday's note (9/15/2023), there are no changes except as documented above.     Patient remains critically ill today requiring active titration of norepinephrine and insulin drips. High risk of deterioration and worsening vital organ dysfunction and death without the above critical care interventions.    Discussed patient condition and risk of morbidity and/or mortality with RN, RT, Pharmacy, Charge nurse / hot rounds, Patient, and [facility]. Critical care time = 32 minutes in directly providing and coordinating critical care and extensive data review.  No time overlap and excludes procedures.    Please note that this dictation was created using voice recognition software. I have made every reasonable attempt to correct obvious errors, but there may be errors of grammar and possibly content that I did not discover before finalizing the note.

## 2023-09-16 NOTE — HOSPITAL COURSE
67 y.o. male who presented 9/15/2023 with a past medical history significant for hypertension, hyperlipidemia, obesity, obstructive sleep apnea, ischemic cardiomyopathy, pancreatitis with insulin insufficiency (HgbA1c >10) and CAD who was referred by cardiology to Dr. Lizet Mann for severe multivessel disease seen on cardiac catheterization on 8/22/2023 which was performed due to stable angina.  Decision was made to take the patient today for an elective three-vessel CABG and intraoperative ANSHUL.              Per anesthesia report at bedside: The three-vessel CABG was performed without complications.  Patient did not require pacing coming off the pump.  He received platelet transfusion intraoperative for some oozing and received 2 L of crystalloid and 250 cc of albumin.  He is brought to the ICU on a Precedex drip at 0.5, insulin drip at 8 units an hour with his last glucose of 290 and a norepinephrine drip at 0.03.  Of note, his glucose was difficult to control throughout the case.  He has a preserved ejection fraction of 60% on ANSHUL.  He was an easy intubation with an 8.5 endotracheal tube at 25 cm at the teeth with a grade 2A view and has a left subclavian central venous catheter and a left radial arterial catheter in place.  His endotracheal tube was pulled back after noting that it was too deep on chest imaging.

## 2023-09-16 NOTE — CARE PLAN
The patient is Watcher - Medium risk of patient condition declining or worsening    Shift Goals  Clinical Goals: Hemodynamic stability, titrate off vasopressors, titrate off O2, pain control.  Patient Goals: Pain control, rest/ sleep.    Progress made toward(s) clinical / shift goals:  Unable to titrate off O2. O2 liters increased throughout the night. Pt was able to sleep through the night not c/o any pain.     Problem: Day of surgery post CABG/Heart valve replacement  Goal: Stabilization in immediate post op period  Outcome: Progressing  Intervention: VS q 15 min x 4 hours, then q 1 hour. Include temperature immediately upon arrival. Check CO/CI q 2-4 hours and PRN  Note: Completed on day shift.  Intervention: If radial artery used, elevate arm, no BP checks or needle sticks from affected arm, monitor ulnar pulse and capillary refill  Note: Left arm radial art line in place. Arm elevated, no BP's or needle sticks to left arm. BCR.  Intervention: First post op hour labs and EKG per order  Note: Completed on day shift.  Intervention: Serum K q 6 hours x 24 hours.  ABG and CBC prn.  Note: Q 6 hour potassium levels checked, replaced per protocol.   Intervention: Initiate post cardiac insulin infusion protocol orders for FSBS greater than 140 and check frequency per protocol  Note: Completed on day shift.  Intervention: FSBS frequency as per Cardiac Surgery Insulin Drip Protocol  Note: Q 1 hour BG checked. Insulin drip adjusted per protocol.   Intervention: For patients on Beta Blockers: verify dose given prior to surgery or within 6 hours after arrival to the unit  Note: Completed on day shift.  Intervention: Chest tube to 20 cm suction, record CT drainage with VS, and check for air leak  Note: Chest tubes to - 20 cm suction. Drainage recorded Q hour. No air leaks.  Intervention: For CT drainage >300 mL in first hour post op and/or 150 mL in subsequent hours: Stat platelets, PT, INR, TEG, iSTAT, and H&H per  order  Note: N/A.  Intervention: Titrate and wean off vasoactive drips per patient's condition and per MD order while maintaining SBP  mmHg per MD order  Note: Unable to titrate and wean off vasoactive drips in order to maintain SBP goals of 90 to 120 mmHg.   Intervention: VAP protocol in place  Note: Completed on day shift.  Intervention: Wean from Vent per protocol (see protocol), extubation goal within 6 hours post op  Note: Completed on day shift.  Intervention: IS q 1 hour while awake post extubation  Note: IS utilized post-extubation while pt is awake.   Intervention: Bedrest until extubated and groin lines out  Note: Completed on day shift.  Intervention: Dangle within 4 hours post extubation  Note: Completed on day shift.  Intervention: Up in chair 4 hours, day of extubation  Note: N/A.  Intervention: Maintain all original surgical dressings per provider orders and specifications  Note: All original surgical dressings remain in place.   Intervention: Clear liquids post extubation, order carbohydrate free (post cardiac surgery) diet, advance as tolerated  Note: Pt passed RN bedside swallow eval. Tolerating clear liquids well.   Intervention: A-Fib and DVT prophylaxis per MD order or contraindications documented (refer to DVT/VTE problem on Care Plan)  Note: DVT prophylaxis ordered and in place. Pt wearing SCD's throughout the night while in bed. Will wear ARMANI hose in AM while out of bed, ambulating, in the chair. Lovenox scheduled to start later today.      Patient is not progressing towards the following goals:    Problem: Pain - Standard  Goal: Alleviation of pain or a reduction in pain to the patient’s comfort goal  Outcome: Not Progressing  Note: PRN pain medication administered to help alleviate pt's pain promoting more successful participation in ADL's. PRN oxycodone, tramadol, and fentanyl utilized. Pt having acute incisional pain r/t recent open heart surgery, as well as chronic pain related to  recently new diagnosis of pancreatitis.

## 2023-09-16 NOTE — PROGRESS NOTES
Monitor summary:    Sinus rhythm 70s, occasional PVCs.    V wires in place, no pacing required, captures appropriately.  VVI  Rate 50 BPM  V output 10 mA  V sensitivity 2 mV

## 2023-09-16 NOTE — CARE PLAN
The patient is Watcher - Medium risk of patient condition declining or worsening       Problem: Day of surgery post CABG/Heart valve replacement  Goal: Stabilization in immediate post op period  Intervention: VS q 15 min x 4 hours, then q 1 hour. Include temperature immediately upon arrival. Check CO/CI q 2-4 hours and PRN  Note: Completed. See flowsheets.  Intervention: First post op hour labs and EKG per order  Note: See results review.  Intervention: Serum K q 6 hours x 24 hours.  ABG and CBC prn.  Note: See results review.  Intervention: Initiate post cardiac insulin infusion protocol orders for FSBS greater than 140 and check frequency per protocol  Note: On insulin gtt, see MAR.  Intervention: FSBS frequency as per Cardiac Surgery Insulin Drip Protocol  Note: Following protocol.  Intervention: Chest tube to 20 cm suction, record CT drainage with VS, and check for air leak  Note: See flowsheets.  Intervention: For CT drainage >300 mL in first hour post op and/or 150 mL in subsequent hours: Stat platelets, PT, INR, TEG, iSTAT, and H&H per order  Note: N/A  Intervention: Titrate and wean off vasoactive drips per patient's condition and per MD order while maintaining SBP  mmHg per MD order  Note: Remains on levo gtt.  Intervention: VAP protocol in place  Note: Extubated.  Intervention: Wean from Vent per protocol (see protocol), extubation goal within 6 hours post op  Note: Extubated at 1620.  Intervention: Dangle within 4 hours post extubation  Note: Sat at edge of bed for 5 min.  Intervention: Up in chair 4 hours, day of extubation  Note: Sat at edge of bed for 5 min.  Intervention: Maintain all original surgical dressings per provider orders and specifications  Note: Sternal Prevena wound vac in place. Right EVH site with ace wrap.  Intervention: Clear liquids post extubation, order carbohydrate free (post cardiac surgery) diet, advance as tolerated  Note: Clear liquid diet ordered.

## 2023-09-16 NOTE — ANESTHESIA POSTPROCEDURE EVALUATION
Patient: Yadiel Moody    Procedure Summary     Date: 09/15/23 Room / Location: [facility] 03 / SURGERY [facility]    Anesthesia Start: 0739 Anesthesia Stop: 1412    Procedures:       CORONARY ARTERY BYPASS GRAFTING X3, ENDOSCOPIC VEIN HARVEST, TRANSESOPHAGEAL ECHOCARDIOGRAM (Chest)      ECHOCARDIOGRAM, TRANSESOPHAGEAL, INTRAOPERATIVE (Esophagus) Diagnosis: (CORONARY ARTERY DISEASE)    Surgeons: Lizet Mann M.D. Responsible Provider: Lexx Reaves M.D.    Anesthesia Type: general ASA Status: 4          Final Anesthesia Type: general  Last vitals  BP   Blood Pressure : 112/67, Arterial BP: (!) 93/48    Temp   36.2 °C (97.2 °F)    Pulse   75   Resp   16    SpO2   94 %      Anesthesia Post Evaluation    Patient location during evaluation: ICU  Patient participation: complete - patient participated  Level of consciousness: awake and alert    Airway patency: patent  Anesthetic complications: no  Cardiovascular status: hemodynamically stable  Respiratory status: acceptable  Hydration status: euvolemic  Comments: Extubated.  Neuro intact.  No anesthesia problems noted.    PONV: none          No notable events documented.     Nurse Pain Score: 8 (NPRS)

## 2023-09-16 NOTE — THERAPY
Physical Therapy Contact Note    Patient Name: Yadiel Moody  Age:  67 y.o., Sex:  male  Medical Record #: [id number]  Today's Date: 9/16/2023    Pt is POD #1. As per department policy, pt to be evaluated on POD #2.

## 2023-09-17 PROBLEM — D69.6 THROMBOCYTOPENIA (HCC): Status: ACTIVE | Noted: 2023-09-17

## 2023-09-17 LAB
ANION GAP SERPL CALC-SCNC: 10 MMOL/L (ref 7–16)
BUN SERPL-MCNC: 14 MG/DL (ref 8–22)
CALCIUM SERPL-MCNC: 8.3 MG/DL (ref 8.5–10.5)
CHLORIDE SERPL-SCNC: 104 MMOL/L (ref 96–112)
CO2 SERPL-SCNC: 20 MMOL/L (ref 20–33)
CREAT SERPL-MCNC: 0.94 MG/DL (ref 0.5–1.4)
ERYTHROCYTE [DISTWIDTH] IN BLOOD BY AUTOMATED COUNT: 47.4 FL (ref 35.9–50)
GFR SERPLBLD CREATININE-BSD FMLA CKD-EPI: 89 ML/MIN/1.73 M 2
GLUCOSE BLD STRIP.AUTO-MCNC: 240 MG/DL (ref 65–99)
GLUCOSE BLD STRIP.AUTO-MCNC: 241 MG/DL (ref 65–99)
GLUCOSE BLD STRIP.AUTO-MCNC: 245 MG/DL (ref 65–99)
GLUCOSE BLD STRIP.AUTO-MCNC: 254 MG/DL (ref 65–99)
GLUCOSE BLD STRIP.AUTO-MCNC: 278 MG/DL (ref 65–99)
GLUCOSE SERPL-MCNC: 242 MG/DL (ref 65–99)
HCT VFR BLD AUTO: 25.5 % (ref 42–52)
HGB BLD-MCNC: 8.6 G/DL (ref 14–18)
LV EJECT FRACT  99904: 65
LV EJECT FRACT MOD 2C 99903: 62.87
LV EJECT FRACT MOD 4C 99902: 76.29
LV EJECT FRACT MOD BP 99901: 70.13
MCH RBC QN AUTO: 30.7 PG (ref 27–33)
MCHC RBC AUTO-ENTMCNC: 33.7 G/DL (ref 32.3–36.5)
MCV RBC AUTO: 91.1 FL (ref 81.4–97.8)
PLATELET # BLD AUTO: 121 K/UL (ref 164–446)
PMV BLD AUTO: 11 FL (ref 9–12.9)
POTASSIUM SERPL-SCNC: 4 MMOL/L (ref 3.6–5.5)
RBC # BLD AUTO: 2.8 M/UL (ref 4.7–6.1)
SODIUM SERPL-SCNC: 134 MMOL/L (ref 135–145)
WBC # BLD AUTO: 10.2 K/UL (ref 4.8–10.8)

## 2023-09-17 PROCEDURE — 99233 SBSQ HOSP IP/OBS HIGH 50: CPT | Performed by: INTERNAL MEDICINE

## 2023-09-17 PROCEDURE — A9270 NON-COVERED ITEM OR SERVICE: HCPCS | Performed by: NURSE PRACTITIONER

## 2023-09-17 PROCEDURE — 770020 HCHG ROOM/CARE - TELE (206)

## 2023-09-17 PROCEDURE — 700102 HCHG RX REV CODE 250 W/ 637 OVERRIDE(OP): Performed by: INTERNAL MEDICINE

## 2023-09-17 PROCEDURE — 700102 HCHG RX REV CODE 250 W/ 637 OVERRIDE(OP): Performed by: NURSE PRACTITIONER

## 2023-09-17 PROCEDURE — 99024 POSTOP FOLLOW-UP VISIT: CPT | Performed by: NURSE PRACTITIONER

## 2023-09-17 PROCEDURE — A9270 NON-COVERED ITEM OR SERVICE: HCPCS | Mod: JZ | Performed by: NURSE PRACTITIONER

## 2023-09-17 PROCEDURE — 700111 HCHG RX REV CODE 636 W/ 250 OVERRIDE (IP): Performed by: NURSE PRACTITIONER

## 2023-09-17 PROCEDURE — 94640 AIRWAY INHALATION TREATMENT: CPT

## 2023-09-17 PROCEDURE — 94669 MECHANICAL CHEST WALL OSCILL: CPT

## 2023-09-17 PROCEDURE — 94660 CPAP INITIATION&MGMT: CPT

## 2023-09-17 PROCEDURE — 700101 HCHG RX REV CODE 250

## 2023-09-17 PROCEDURE — 80048 BASIC METABOLIC PNL TOTAL CA: CPT

## 2023-09-17 PROCEDURE — 93005 ELECTROCARDIOGRAM TRACING: CPT | Performed by: THORACIC SURGERY (CARDIOTHORACIC VASCULAR SURGERY)

## 2023-09-17 PROCEDURE — 700111 HCHG RX REV CODE 636 W/ 250 OVERRIDE (IP): Mod: JZ | Performed by: NURSE PRACTITIONER

## 2023-09-17 PROCEDURE — 94760 N-INVAS EAR/PLS OXIMETRY 1: CPT

## 2023-09-17 PROCEDURE — 82962 GLUCOSE BLOOD TEST: CPT

## 2023-09-17 PROCEDURE — 700102 HCHG RX REV CODE 250 W/ 637 OVERRIDE(OP): Mod: JZ | Performed by: NURSE PRACTITIONER

## 2023-09-17 PROCEDURE — 85027 COMPLETE CBC AUTOMATED: CPT

## 2023-09-17 RX ORDER — INSULIN LISPRO 100 [IU]/ML
0.2 INJECTION, SOLUTION INTRAVENOUS; SUBCUTANEOUS
Status: DISCONTINUED | OUTPATIENT
Start: 2023-09-17 | End: 2023-09-20 | Stop reason: HOSPADM

## 2023-09-17 RX ORDER — IPRATROPIUM BROMIDE AND ALBUTEROL SULFATE 2.5; .5 MG/3ML; MG/3ML
3 SOLUTION RESPIRATORY (INHALATION)
Status: DISCONTINUED | OUTPATIENT
Start: 2023-09-17 | End: 2023-09-20 | Stop reason: HOSPADM

## 2023-09-17 RX ORDER — IPRATROPIUM BROMIDE AND ALBUTEROL SULFATE 2.5; .5 MG/3ML; MG/3ML
SOLUTION RESPIRATORY (INHALATION)
Status: COMPLETED
Start: 2023-09-17 | End: 2023-09-17

## 2023-09-17 RX ORDER — DEXTROSE MONOHYDRATE 25 G/50ML
25 INJECTION, SOLUTION INTRAVENOUS
Status: DISCONTINUED | OUTPATIENT
Start: 2023-09-17 | End: 2023-09-20 | Stop reason: HOSPADM

## 2023-09-17 RX ORDER — IPRATROPIUM BROMIDE AND ALBUTEROL SULFATE 2.5; .5 MG/3ML; MG/3ML
3 SOLUTION RESPIRATORY (INHALATION)
Status: DISCONTINUED | OUTPATIENT
Start: 2023-09-17 | End: 2023-09-17

## 2023-09-17 RX ORDER — POTASSIUM CHLORIDE 20 MEQ/1
40 TABLET, EXTENDED RELEASE ORAL DAILY
Status: DISCONTINUED | OUTPATIENT
Start: 2023-09-17 | End: 2023-09-20 | Stop reason: HOSPADM

## 2023-09-17 RX ORDER — FUROSEMIDE 10 MG/ML
40 INJECTION INTRAMUSCULAR; INTRAVENOUS
Status: DISCONTINUED | OUTPATIENT
Start: 2023-09-17 | End: 2023-09-20 | Stop reason: HOSPADM

## 2023-09-17 RX ORDER — INSULIN LISPRO 100 [IU]/ML
2-9 INJECTION, SOLUTION INTRAVENOUS; SUBCUTANEOUS
Status: DISCONTINUED | OUTPATIENT
Start: 2023-09-17 | End: 2023-09-20 | Stop reason: HOSPADM

## 2023-09-17 RX ADMIN — ATORVASTATIN CALCIUM 40 MG: 40 TABLET, FILM COATED ORAL at 21:08

## 2023-09-17 RX ADMIN — OXYCODONE HYDROCHLORIDE 10 MG: 10 TABLET ORAL at 00:32

## 2023-09-17 RX ADMIN — OMEPRAZOLE 20 MG: 20 CAPSULE, DELAYED RELEASE ORAL at 04:53

## 2023-09-17 RX ADMIN — METOPROLOL TARTRATE 25 MG: 25 TABLET, FILM COATED ORAL at 05:31

## 2023-09-17 RX ADMIN — Medication 1 APPLICATOR: at 08:32

## 2023-09-17 RX ADMIN — INSULIN GLARGINE-YFGN 5 UNITS: 100 INJECTION, SOLUTION SUBCUTANEOUS at 08:28

## 2023-09-17 RX ADMIN — FUROSEMIDE 40 MG: 10 INJECTION INTRAMUSCULAR; INTRAVENOUS at 08:17

## 2023-09-17 RX ADMIN — INSULIN LISPRO 3 UNITS: 100 INJECTION, SOLUTION INTRAVENOUS; SUBCUTANEOUS at 18:15

## 2023-09-17 RX ADMIN — INSULIN LISPRO 5 UNITS: 100 INJECTION, SOLUTION INTRAVENOUS; SUBCUTANEOUS at 21:14

## 2023-09-17 RX ADMIN — SENNOSIDES AND DOCUSATE SODIUM 2 TABLET: 50; 8.6 TABLET ORAL at 04:52

## 2023-09-17 RX ADMIN — CLOPIDOGREL BISULFATE 75 MG: 75 TABLET ORAL at 04:52

## 2023-09-17 RX ADMIN — POTASSIUM CHLORIDE 40 MEQ: 1500 TABLET, EXTENDED RELEASE ORAL at 08:17

## 2023-09-17 RX ADMIN — MAGNESIUM HYDROXIDE 30 ML: 1200 LIQUID ORAL at 05:36

## 2023-09-17 RX ADMIN — ACETAMINOPHEN 1000 MG: 500 TABLET, FILM COATED ORAL at 04:53

## 2023-09-17 RX ADMIN — IPRATROPIUM BROMIDE AND ALBUTEROL SULFATE 3 ML: 2.5; .5 SOLUTION RESPIRATORY (INHALATION) at 00:24

## 2023-09-17 RX ADMIN — ACETAMINOPHEN 1000 MG: 500 TABLET, FILM COATED ORAL at 00:29

## 2023-09-17 RX ADMIN — SENNOSIDES AND DOCUSATE SODIUM 2 TABLET: 50; 8.6 TABLET ORAL at 18:10

## 2023-09-17 RX ADMIN — ACETAMINOPHEN 1000 MG: 500 TABLET, FILM COATED ORAL at 12:24

## 2023-09-17 RX ADMIN — ACETAMINOPHEN 1000 MG: 500 TABLET, FILM COATED ORAL at 18:10

## 2023-09-17 RX ADMIN — Medication 1 APPLICATOR: at 21:07

## 2023-09-17 RX ADMIN — INSULIN LISPRO 7 UNITS: 100 INJECTION, SOLUTION INTRAVENOUS; SUBCUTANEOUS at 12:29

## 2023-09-17 RX ADMIN — INSULIN LISPRO 5 UNITS: 100 INJECTION, SOLUTION INTRAVENOUS; SUBCUTANEOUS at 08:27

## 2023-09-17 RX ADMIN — INSULIN GLARGINE-YFGN 10 UNITS: 100 INJECTION, SOLUTION SUBCUTANEOUS at 05:29

## 2023-09-17 RX ADMIN — METOPROLOL TARTRATE 25 MG: 25 TABLET, FILM COATED ORAL at 18:10

## 2023-09-17 RX ADMIN — INSULIN LISPRO 7 UNITS: 100 INJECTION, SOLUTION INTRAVENOUS; SUBCUTANEOUS at 08:30

## 2023-09-17 RX ADMIN — INSULIN LISPRO 3 UNITS: 100 INJECTION, SOLUTION INTRAVENOUS; SUBCUTANEOUS at 12:29

## 2023-09-17 RX ADMIN — ENOXAPARIN SODIUM 40 MG: 100 INJECTION SUBCUTANEOUS at 18:09

## 2023-09-17 RX ADMIN — ASPIRIN 81 MG: 81 TABLET, COATED ORAL at 04:51

## 2023-09-17 RX ADMIN — POLYETHYLENE GLYCOL 3350 1 PACKET: 17 POWDER, FOR SOLUTION ORAL at 05:36

## 2023-09-17 RX ADMIN — ACETAMINOPHEN 1000 MG: 500 TABLET, FILM COATED ORAL at 23:59

## 2023-09-17 RX ADMIN — MAGNESIUM SULFATE HEPTAHYDRATE 1 G: 1 INJECTION, SOLUTION INTRAVENOUS at 04:51

## 2023-09-17 RX ADMIN — INSULIN LISPRO 7 UNITS: 100 INJECTION, SOLUTION INTRAVENOUS; SUBCUTANEOUS at 18:16

## 2023-09-17 RX ADMIN — TAMSULOSIN HYDROCHLORIDE 0.4 MG: 0.4 CAPSULE ORAL at 08:17

## 2023-09-17 ASSESSMENT — ENCOUNTER SYMPTOMS
WHEEZING: 0
SORE THROAT: 0
DOUBLE VISION: 0
SHORTNESS OF BREATH: 0
FEVER: 0
HEADACHES: 0
DEPRESSION: 0
DIZZINESS: 0
FLANK PAIN: 0
COUGH: 0
BACK PAIN: 0
FOCAL WEAKNESS: 0
VOMITING: 0
ABDOMINAL PAIN: 0
SENSORY CHANGE: 0
HEARTBURN: 0

## 2023-09-17 ASSESSMENT — PAIN DESCRIPTION - PAIN TYPE
TYPE: ACUTE PAIN
TYPE: ACUTE PAIN;SURGICAL PAIN
TYPE: ACUTE PAIN;SURGICAL PAIN
TYPE: ACUTE PAIN
TYPE: ACUTE PAIN
TYPE: ACUTE PAIN;SURGICAL PAIN
TYPE: ACUTE PAIN

## 2023-09-17 ASSESSMENT — COGNITIVE AND FUNCTIONAL STATUS - GENERAL
TURNING FROM BACK TO SIDE WHILE IN FLAT BAD: A LITTLE
MOBILITY SCORE: 18
DRESSING REGULAR LOWER BODY CLOTHING: A LITTLE
SUGGESTED CMS G CODE MODIFIER MOBILITY: CK
STANDING UP FROM CHAIR USING ARMS: A LITTLE
HELP NEEDED FOR BATHING: A LITTLE
DAILY ACTIVITIY SCORE: 21
MOVING TO AND FROM BED TO CHAIR: A LITTLE
WALKING IN HOSPITAL ROOM: A LITTLE
MOVING FROM LYING ON BACK TO SITTING ON SIDE OF FLAT BED: A LITTLE
SUGGESTED CMS G CODE MODIFIER DAILY ACTIVITY: CJ
DRESSING REGULAR UPPER BODY CLOTHING: A LITTLE
CLIMB 3 TO 5 STEPS WITH RAILING: A LITTLE

## 2023-09-17 ASSESSMENT — FIBROSIS 4 INDEX: FIB4 SCORE: 1.55

## 2023-09-17 NOTE — PROGRESS NOTES
Critical Care Progress Note    Date of admission  9/15/2023    Chief Complaint  67 y.o. male admitted 9/15/2023 with CAD s/p CABG    Hospital Course  67 y.o. male who presented 9/15/2023 with a past medical history significant for hypertension, hyperlipidemia, obesity, obstructive sleep apnea, ischemic cardiomyopathy, pancreatitis with insulin insufficiency (HgbA1c >10) and CAD who was referred by cardiology to Dr. Lizet Mann for severe multivessel disease seen on cardiac catheterization on 8/22/2023 which was performed due to stable angina.  Decision was made to take the patient today for an elective three-vessel CABG and intraoperative ANSHUL.              Per anesthesia report at bedside: The three-vessel CABG was performed without complications.  Patient did not require pacing coming off the pump.  He received platelet transfusion intraoperative for some oozing and received 2 L of crystalloid and 250 cc of albumin.  He is brought to the ICU on a Precedex drip at 0.5, insulin drip at 8 units an hour with his last glucose of 290 and a norepinephrine drip at 0.03.  Of note, his glucose was difficult to control throughout the case.  He has a preserved ejection fraction of 60% on ANSHUL.  He was an easy intubation with an 8.5 endotracheal tube at 25 cm at the teeth with a grade 2A view and has a left subclavian central venous catheter and a left radial arterial catheter in place.  His endotracheal tube was pulled back after noting that it was too deep on chest imaging.    Interval Problem Update  Reviewed last 24 hour events:   - weaned off levophed + insulin gtts   - AF, nl WBC   - AAOx4   - NSR, SBP    - Hgb down to 8.6, plts down to 121   - Na down to 134   - glucose elevated despite lantus + ISS --> increased lantus dose   - good UOP   - aditi diet   - CT with minimal output    Review of Systems  Review of Systems   Constitutional:  Negative for fever and malaise/fatigue.   HENT:  Negative for sore throat.     Eyes:  Negative for double vision.   Respiratory:  Negative for cough, shortness of breath and wheezing.    Cardiovascular:  Positive for chest pain. Negative for leg swelling.   Gastrointestinal:  Negative for abdominal pain, heartburn and vomiting.   Genitourinary:  Negative for flank pain.   Musculoskeletal:  Negative for back pain.   Neurological:  Negative for dizziness, sensory change, focal weakness and headaches.   Psychiatric/Behavioral:  Negative for depression.    All other systems reviewed and are negative.       Vital Signs for last 24 hours   Temp:  [36.6 °C (97.9 °F)-37 °C (98.6 °F)] 37 °C (98.6 °F)  Pulse:  [] 96  Resp:  [20-26] 20  BP: ()/(54-63) 118/63  SpO2:  [89 %-98 %] 89 %    Hemodynamic parameters for last 24 hours  CVP:  [3 MM HG-10 MM HG] 10 MM HG    Respiratory Information for the last 24 hours    2 lpm n/c, IS >2L    Physical Exam   Physical Exam  Vitals and nursing note reviewed.   Constitutional:       General: He is awake. He is not in acute distress.     Appearance: He is well-developed and overweight.      Interventions: Nasal cannula in place.      Comments: Sitting up in chair again this morning   HENT:      Head: Normocephalic and atraumatic.      Nose: Nose normal.      Mouth/Throat:      Mouth: Mucous membranes are moist.      Pharynx: Oropharynx is clear. No posterior oropharyngeal erythema.   Eyes:      General: No scleral icterus.     Conjunctiva/sclera: Conjunctivae normal.      Pupils: Pupils are equal, round, and reactive to light.   Neck:      Vascular: No JVD.      Comments: Right IJ central venous catheter without surrounding hematoma  Cardiovascular:      Rate and Rhythm: Normal rate and regular rhythm. No extrasystoles are present.     Chest Wall: PMI is displaced.      Pulses: No decreased pulses.      Heart sounds: Heart sounds are distant. No murmur heard.     Comments: Sternotomy incision is clean/dry/intact with mediastinal chest tubes and epicardial  pacing wires in place  Pulmonary:      Effort: Pulmonary effort is normal. No tachypnea or respiratory distress.      Breath sounds: No stridor. Examination of the right-lower field reveals rales. Examination of the left-lower field reveals rales. Rales present. No wheezing.      Comments: Speaking in full sentences without difficulty  Abdominal:      General: Bowel sounds are normal. There is no distension.      Palpations: Abdomen is soft.      Tenderness: There is no abdominal tenderness. There is no guarding or rebound.   Genitourinary:     Comments: Bhandari catheter in place  Musculoskeletal:         General: No tenderness.      Cervical back: Neck supple. No rigidity.      Right lower leg: No edema.      Left lower leg: No edema.   Skin:     General: Skin is warm and dry.      Capillary Refill: Capillary refill takes less than 2 seconds.      Coloration: Skin is not pale.   Neurological:      General: No focal deficit present.      Mental Status: He is alert and oriented to person, place, and time.      Cranial Nerves: No cranial nerve deficit.      Sensory: No sensory deficit.   Psychiatric:         Mood and Affect: Mood normal.         Behavior: Behavior normal. Behavior is cooperative.         Thought Content: Thought content normal.         Medications  Current Facility-Administered Medications   Medication Dose Route Frequency Provider Last Rate Last Admin    ipratropium-albuterol (DUONEB) nebulizer solution  3 mL Nebulization Q4HRS (RT) Jeremy M Gonda, M.D.   3 mL at 09/17/23 0024    insulin GLARGINE (Lantus,Semglee) injection  10 Units Subcutaneous QAM INSULIN Jeremy M Gonda, M.D.   10 Units at 09/17/23 0529    And    insulin lispro (HumaLOG,AdmeLOG) injection  0.2 Units/kg/day Subcutaneous TID AC Jeremy M Gonda, M.D.   7 Units at 09/16/23 1715    And    insulin lispro (HumaLOG,AdmeLOG) injection  2-9 Units Subcutaneous 4X/DAY ACHS Jeremy M Gonda, M.D.   5 Units at 09/16/23 2103    And    dextrose 50%  (D50W) injection 25 g  25 g Intravenous Q15 MIN PRN Jeremy M Gonda, M.D.        norepinephrine (Levophed) 8 mg in 250 mL NS infusion (premix)  0-1 mcg/kg/min (Ideal) Intravenous Continuous Lizet Mann M.D. 4 mL/hr at 09/16/23 0419 0.03 mcg/kg/min at 09/16/23 0419    EPINEPHrine (Adrenalin) infusion 4 mg/250 mL (premix)  0-0.5 mcg/kg/min (Ideal) Intravenous Continuous Lizet Mann M.D.   Dose not Required at 09/15/23 1415    Respiratory Therapy Consult   Nebulization Continuous RT Lc Delgado, A.P.R.N.        NS infusion   Intravenous Continuous Lc Delgado, A.P.R.N. 10 mL/hr at 09/15/23 1912 Rate Verify at 09/15/23 1912    NS infusion   Intravenous Continuous Lc Delgado, A.P.R.N.   Stopped at 09/16/23 0413    Nozin nasal  swab  1 Applicator Each Nostril BID Lc Delgado, A.P.R.N.   1 Applicator at 09/16/23 2100    metoprolol tartrate (Lopressor) tablet 25 mg  25 mg Oral BID Lc W Sandy, A.P.R.N.   25 mg at 09/17/23 0531    aspirin EC tablet 81 mg  81 mg Oral DAILY Lc W Kading, A.P.R.N.   81 mg at 09/17/23 0451    clopidogrel (Plavix) tablet 75 mg  75 mg Oral DAILY Lc W Sandy, A.P.R.N.   75 mg at 09/17/23 0452    atorvastatin (Lipitor) tablet 40 mg  40 mg Oral QHS Lc W Sandy, A.P.R.N.   40 mg at 09/16/23 2100    clevidipine (Cleviprex) IV emulsion  0-21 mg/hr Intravenous Continuous Lc W Sandy, A.P.R.N.   Dose not Required at 09/15/23 1415    nitroglycerin 50 mg in D5W 250 ml infusion  0-100 mcg/min Intravenous Continuous Lc W Sandy, A.P.R.N.   Dose not Required at 09/15/23 1415    acetaminophen (Tylenol) tablet 1,000 mg  1,000 mg Oral Q6HRS Lc Delgado, A.P.R.N.   1,000 mg at 09/17/23 0453    Followed by    [START ON 9/20/2023] acetaminophen (Tylenol) tablet 1,000 mg  1,000 mg Oral Q6HRS PRN Lc Delgado, A.P.R.N.        oxyCODONE immediate-release (Roxicodone) tablet 5 mg  5 mg Oral Q3HRS PRN Lc Delgado, A.P.R.N.   5 mg at 09/16/23 2100    Or    oxyCODONE immediate  release (Roxicodone) tablet 10 mg  10 mg Oral Q3HRS PRN Lc Delgado, A.P.R.N.   10 mg at 09/17/23 0032    Or    fentaNYL (Sublimaze) injection 50 mcg  50 mcg Intravenous Q3HRS PRN Lc Delgado, A.P.R.N.   50 mcg at 09/15/23 1938    traMADol (Ultram) 50 MG tablet 50 mg  50 mg Oral Q4HRS PRN Lc Delgado, A.P.R.N.   50 mg at 09/15/23 1834    dexmedetomidine (PRECEDEX) 400 mcg/100mL NS premix infusion  0-1.5 mcg/kg/hr (Ideal) Intravenous Continuous Lc Delgado, A.P.R.N.   Stopped at 09/16/23 0258    ondansetron (Zofran) syringe/vial injection 8 mg  8 mg Intravenous Q6HRS PRN Lc Delgado, A.P.R.N.        Or    prochlorperazine (Compazine) injection 10 mg  10 mg Intravenous Q6HRS PRN Lc Delgado, A.P.R.N.        acetaminophen (Tylenol) tablet 650 mg  650 mg Oral Q4HRS PRN Lc Delgado, A.P.R.N.        Or    acetaminophen (Tylenol) suppository 650 mg  650 mg Rectal Q4HRS PRN Lc Delgado, A.P.R.N.        senna-docusate (Pericolace Or Senokot S) 8.6-50 MG per tablet 2 Tablet  2 Tablet Oral BID Lc Delgado, A.P.R.N.   2 Tablet at 09/17/23 0452    And    polyethylene glycol/lytes (Miralax) PACKET 1 Packet  1 Packet Oral DAILY Lc Delgado, A.P.R.N.   1 Packet at 09/17/23 0536    And    magnesium hydroxide (Milk Of Magnesia) suspension 30 mL  30 mL Oral DAILY Lc Delgado, A.P.R.N.   30 mL at 09/17/23 0536    And    bisacodyl (Dulcolax) suppository 10 mg  10 mg Rectal QDAY PRN Lc Delgado, A.P.R.N.        omeprazole (PriLOSEC) capsule 20 mg  20 mg Oral DAILY Lc Delgado, A.P.R.N.   20 mg at 09/17/23 0453    mag hydrox-al hydrox-simeth (Maalox Plus Es Or Mylanta Ds) suspension 30 mL  30 mL Oral Q4HRS PRN Lc Delgado A.P.R.N.        diphenhydrAMINE (Benadryl) tablet/capsule 25 mg  25 mg Oral HS PRN - MR X 1 Lc Delgado A.P.R.N.        enoxaparin (Lovenox) inj 40 mg  40 mg Subcutaneous DAILY AT 1800 Lc Delgado, A.P.R.N.   40 mg at 09/16/23 1712    tamsulosin (Flomax) capsule 0.4 mg  0.4 mg Oral AFTER  BREAKFAST Lc Delgado A.P.R.NKameron   0.4 mg at 09/16/23 0812       Fluids    Intake/Output Summary (Last 24 hours) at 9/17/2023 0646  Last data filed at 9/17/2023 0600  Gross per 24 hour   Intake 400 ml   Output 1553 ml   Net -1153 ml         Laboratory  Recent Labs     09/15/23  1415 09/15/23  1505 09/15/23  1606   ISTATAPH 7.327* 7.327* 7.318*   ISTATAPCO2 42.0* 40.1* 40.2*   ISTATAPO2 141* 82 92*   ISTATATCO2 23 22 22   JYAHMLO5IOZ 99 95 96   ISTATARTHCO3 22.0 21.0 20.6   ISTATARTBE -4 -5* -5*   ISTATTEMP 35.8 C 36.0 C 36.3 C   ISTATFIO2 100 60  --    ISTATSPEC Arterial Arterial Arterial   ISTATAPHTC 7.345* 7.341* 7.328*   [id number] 134* 76 88*           Recent Labs     09/15/23  1410 09/15/23  1700 09/16/23  0150 09/16/23  0500 09/17/23  0027   SODIUM  --   --  140  --  134*   POTASSIUM 3.7   < > 4.0 4.3 4.0   CHLORIDE  --   --  110  --  104   CO2  --   --  21  --  20   BUN  --   --  14  --  14   CREATININE  --   --  0.82  --  0.94   MAGNESIUM 2.9*  --   --   --   --    CALCIUM  --   --  8.2*  --  8.3*    < > = values in this interval not displayed.       Recent Labs     09/16/23  0150 09/17/23  0027   GLUCOSE 110* 242*       Recent Labs     09/16/23  0150 09/17/23  0027   WBC 10.3 10.2       Recent Labs     09/15/23  1410 09/16/23  0150 09/17/23  0027   RBC  --  3.17* 2.80*   HEMOGLOBIN 11.3* 9.9* 8.6*   HEMATOCRIT 31.0* 27.2* 25.5*   PLATELETCT 149* 152* 121*   PROTHROMBTM 16.4*  --   --    APTT 42.3*  --   --    INR 1.30*  --   --          Imaging  X-Ray:  I have personally reviewed the images and compared with prior images. and No film today    Assessment/Plan  * Coronary artery disease of native artery with stable angina pectoris (HCC)- (present on admission)  Assessment & Plan  Status post CABGx3 with intraoperative ANSHUL 9/15/2023  Continue routine postoperative management  Removal of mediastinal chest tubes today  Strict blood pressure management  As needed analgesics  ASA, statin, BB    Encounter for  weaning from ventilator (HCC)  Assessment & Plan  Intubated intraoperatively 9/15/2023 - extubated POD 0 per protocol  Titrate O2 therapy to goal SPO2 greater than 92%  RT/O2 protocol  Encourage incentive spirometry, mobilization    Ischemic cardiomyopathy- (present on admission)  Assessment & Plan  Preserved EF of 60% today  Resume BB today, diurese    Pre-diabetes- (present on admission)  Assessment & Plan  Secondary to pancreatitis 6 months ago - uncontrolled glucose  Increase lantus to 15 units qam, aggressive ISS  Diabetic diet    NARCISO (obstructive sleep apnea)- (present on admission)  Assessment & Plan  Nocturnal CPAP    Obesity- (present on admission)  Assessment & Plan  Encourage behavioral and dietary modification for weight loss    Hyperlipidemia- (present on admission)  Assessment & Plan  Continue atorvastatin    Hypertension- (present on admission)  Assessment & Plan  Controlled  Begin metoprolol and monitor, goal systolic blood pressure less than 120    Thrombocytopenia (HCC)  Assessment & Plan  Consumptive - worse today  Monitor with daily CBC    Hypocalcemia  Assessment & Plan  Repleted    Acute blood loss as cause of postoperative anemia  Assessment & Plan  Status post platelet transfusion Intra-Op for oozing - mild drop in Hgb today  Daily CBC with conservative transfusion strategy         VTE:  Lovenox  Ulcer: Not Indicated  Lines: Central Line  Ongoing indication addressed and Bhandari Catheter  to be removed today    I have performed a physical exam and reviewed and updated ROS and Plan today (9/17/2023). In review of yesterday's note (9/16/2023), there are no changes except as documented above.     Discussed patient condition and risk of morbidity and/or mortality with RN, RT, Pharmacy, Charge nurse / hot rounds, Patient, and [facility]. Critical care services will sign off at this time.  Please call with any questions or concerns.      Please note that this dictation was created using voice recognition  software. I have made every reasonable attempt to correct obvious errors, but there may be errors of grammar and possibly content that I did not discover before finalizing the note.

## 2023-09-17 NOTE — CARE PLAN
Problem: Hyperinflation  Goal: Prevent or improve atelectasis  Description: Target End Date:  3 to 4 days    1. Instruct incentive spirometry usage  2.  Perform hyperinflation therapy as indicated  Outcome: Progressing     PEP QID

## 2023-09-17 NOTE — CARE PLAN
The patient is Stable - Low risk of patient condition declining or worsening    Shift Goals  Clinical Goals: hemodynamic stability, mobilization. pain control  Patient Goals: mobilization, get stronger  Family Goals: RONAN    Progress made toward(s) clinical / shift goals:    Problem: Pain - Standard  Goal: Alleviation of pain or a reduction in pain to the patient’s comfort goal  Description: Target End Date:  Prior to discharge or change in level of care    Document on Vitals flowsheet    1.  Document pain using the appropriate pain scale per order or unit policy  2.  Educate and implement non-pharmacologic comfort measures (i.e. relaxation, distraction, massage, cold/heat therapy, etc.)  3.  Pain management medications as ordered  4.  Reassess pain after pain med administration per policy  5.  If opiods administered assess patient's response to pain medication is appropriate per POSS sedation scale  6.  Follow pain management plan developed in collaboration with patient and interdisciplinary team (including palliative care or pain specialists if applicable)  Outcome: Progressing  Note: Pain monitored throughout shift using 0-10 pain scale, with interventions as needed, see MAR     Problem: Post Op Day 1 CABG/Heart Valve Replacement  Goal: Optimal care of the post op CABG/heart valve replacement Post Op Day 1  9/17/2023 0350 by Gi Pedroza R.N.  Outcome: Progressing  9/17/2023 0322 by Gi Pedroza R.N.  Outcome: Progressing  Intervention: EKG and CXR completed  Note: Completed  Intervention: All valve patients: PT/INR daily  Note: N/A not a valve pt  Intervention: Antibiotics are discontinued within 24 hours of anesthesia end time unless indication documented for continuation beyond 24 hours  Note: Completed  Intervention: Daily weights in the morning  Note: Completed  Intervention: Up in chair for all meals  Note: Completed  Intervention: Ambulate in am if stable. First ambulation 25 feet. Repeat x 3 as  tolerated  Note: Ambulated 500ft, tolerated well  Intervention: Discontinue montalvo catheter unless documented reason for continuation  Note: Montalvo still in place  Intervention: Assess surgical dressing and check provider orders for potential removal  Note: Surgical dressing in place, wound vac in place  Intervention: Ensure referal to intensive cardiac rehab is ordered, and smoking cessation education if appropriate  Note: completed  Intervention: [facility] trained RN to remove chest tubes if ordered by provider  Note: Chest tubes still in place  Intervention: IS q 1 hour while awake and record best IS volume  Note: Pt educated on IS and importance, 2k best  Intervention: Knee high SOLIS hose, on during the day, off at night  Note: Solis hose removed at night and SCDs placed  Intervention: Saline lock IV  Note: Ivs saline locked   Intervention: Transfer to [facility] status, begin VS q 4 hours  Note: APRN will complete as needed  Intervention: After 24th hour post-anesthesia end time, transition patient to Cardiac Surgery SQ Insulin Protocol  Note: Completed  Intervention: If patient is CABG or on home beta-blocker, start/resume beta-blocker on POD 1 or POD 2 or document contraindication  Note: Completed

## 2023-09-17 NOTE — PROGRESS NOTES
Patient transported to room 704 with ICU RN. Report received and assumed patient care. Pt A&O x 4 and on room air. No signs of distress noted at this time. Pt placed on the tele box and monitors were notified of arrival. Pt updated on plan of care for the day and has call light within reach. Fall precautions are in place.

## 2023-09-17 NOTE — CARE PLAN
Problem: Hyperinflation  Goal: Prevent or improve atelectasis  Description: Target End Date:  3 to 4 days    1. Instruct incentive spirometry usage  2.  Perform hyperinflation therapy as indicated  Outcome: Progressing     Problem: Ventilation  Goal: Ability to achieve and maintain unassisted ventilation or tolerate decreased levels of ventilator support  Description: Target End Date:  4 days     Document on Vent flowsheet    1.  Support and monitor invasive and noninvasive mechanical ventilation  2.  Monitor ventilator weaning response  3.  Perform ventilator associated pneumonia prevention interventions  4.  Manage ventilation therapy by monitoring diagnostic test results  Outcome: Met

## 2023-09-17 NOTE — PROGRESS NOTES
Cardiovascular Surgery Progress Note    Name: Yadiel Moody  MRN: [id number]  : 1955  Admit Date: 9/15/2023  5:01 AM  2 Days Post-Op     Procedure:  Procedure(s) and Anesthesia Type:     * Coronary artery bypass grafting x3  Left internal mammary artery to left anterior descending artery  Reversed saphenous vein graft to obtuse marginal artery  Reversed saphenous vein graft to distal right coronary artery  Right coronary artery endarterectomy  Endo-vein procurement    Vitals:  Vitals:    23 0300 23 0400 23 0500 23 0600   BP: 111/57 105/59 107/56 118/63   Pulse: 87 88 91 96   Resp:       Temp:       TempSrc:       SpO2: 92% 93% 92% 89%   Weight:   104 kg (228 lb 6.3 oz)    Height:          Temp (24hrs), Av.8 °C (98.2 °F), Min:36.6 °C (97.9 °F), Max:37 °C (98.6 °F)      Respiratory:    Respiration: 20, Pulse Oximetry: 89 %       Fluids:    Intake/Output Summary (Last 24 hours) at 2023 0707  Last data filed at 2023 0600  Gross per 24 hour   Intake 400 ml   Output 1553 ml   Net -1153 ml       Admit weight: Weight: 96.5 kg (212 lb 11.9 oz)  Current weight: Weight: 104 kg (228 lb 6.3 oz) (23 0500)    Labs:  Recent Labs     09/15/23  1410 23  0150 23  0027   WBC  --  10.3 10.2   RBC  --  3.17* 2.80*   HEMOGLOBIN 11.3* 9.9* 8.6*   HEMATOCRIT 31.0* 27.2* 25.5*   MCV  --  85.8 91.1   MCH  --  31.2 30.7   MCHC  --  36.4 33.7   RDW  --  41.0 47.4   PLATELETCT 149* 152* 121*   MPV  --  11.0 11.0       Recent Labs     23  0150 23  0500 23  0027   SODIUM 140  --  134*   POTASSIUM 4.0 4.3 4.0   CHLORIDE 110  --  104   CO2 21  --  20   GLUCOSE 110*  --  242*   BUN 14  --  14   CREATININE 0.82  --  0.94   CALCIUM 8.2*  --  8.3*       Recent Labs     09/15/23  1410   APTT 42.3*   INR 1.30*             Medications:  Scheduled Medications   Medication Dose Frequency    ipratropium-albuterol  3 mL Q4HRS (RT)    insulin GLARGINE  10 Units QAM INSULIN     And    insulin lispro  0.2 Units/kg/day TID AC    And    insulin lispro  2-9 Units 4X/DAY ACHS    Nozin nasal  swab  1 Applicator BID    metoprolol tartrate  25 mg BID    aspirin  81 mg DAILY    clopidogrel  75 mg DAILY    atorvastatin  40 mg QHS    acetaminophen  1,000 mg Q6HRS    senna-docusate  2 Tablet BID    And    polyethylene glycol/lytes  1 Packet DAILY    And    magnesium hydroxide  30 mL DAILY    omeprazole  20 mg DAILY    enoxaparin (LOVENOX) injection  40 mg DAILY AT 1800    tamsulosin  0.4 mg AFTER BREAKFAST        Exam:   Physical Exam  Vitals and nursing note reviewed.   Constitutional:       General: He is not in acute distress.     Appearance: Normal appearance.   HENT:      Head: Normocephalic.      Right Ear: External ear normal.      Left Ear: External ear normal.      Nose: Nose normal. No congestion.      Mouth/Throat:      Mouth: Mucous membranes are moist.      Pharynx: Oropharynx is clear.   Eyes:      Extraocular Movements: Extraocular movements intact.      Pupils: Pupils are equal, round, and reactive to light.   Cardiovascular:      Rate and Rhythm: Normal rate and regular rhythm.      Pulses: Normal pulses.      Heart sounds: Normal heart sounds.   Pulmonary:      Effort: Pulmonary effort is normal.      Breath sounds: Decreased breath sounds present.   Abdominal:      General: Bowel sounds are decreased. There is no distension.      Palpations: Abdomen is soft.   Musculoskeletal:      Cervical back: Normal range of motion and neck supple. No tenderness.      Right lower leg: Edema present.      Left lower leg: Edema present.   Skin:     General: Skin is warm and dry.      Comments: Midline surgical incision w prevena. EVH site   Neurological:      General: No focal deficit present.      Mental Status: He is alert and oriented to person, place, and time. Mental status is at baseline.   Psychiatric:         Mood and Affect: Mood normal.         Behavior: Behavior normal.          Thought Content: Thought content normal.         Cardiac Medications:    ASA - Yes    Plavix - Yes    Post-operative Beta Blockers - Yes    Ace/ARB- No; contraindicated because of Hypotension and Normal EF    Statin - Yes    Aldactone- No; contraindicated because of Normal EF    SGLT2i-  No contraindicated because of hypotension    Ejection Fraction:  65%    Telemetry:   9/16 SR  9/17 SR/ST    Assessment/Plan:  POD 1  On norepi, SR, neuro intact, prevena in place, abdomen soft, fluid balance positive, wt up,  3L NC. 266 mL out of chest tubes overnight. Plan:  Keep chest tubes and pacing wires. Albumin this AM, wean norepi as able, IS/ambulate.    POD 2  HDS, SR/ST, neuro intact, prevena in place, abdomen soft, fluid balance positive, wt up,  2L NC. 123 mL out of chest tubes last night.  Plan: Diurese. Remove chest tubes, pacing wires, and montalvo catheter. IS/ambulate. Transfer to [facility].     Disposition:  TBD

## 2023-09-17 NOTE — DIETARY
Nutrition Services: Cardiac Education Consult   Day 2 of admit.  Yadiel Moody is a 67 y.o. male with admitting DX of Atherosclerotic heart disease of native coronary artery without angina pectoris.    RD able to visit pt at bedside to provide heart healthy diet education. RD discussed low fat, low sodium foods. Pt also has hx of pancreatitis; RD discussed low fat, high protein diet with pt. RD provided handout reinforcing topics discussed. Pt demonstrated appropriate readiness and adequate evidence of learning. RD able to answer all questions to patient's satisfaction.     No other education needs identified at this time. Consider referral to outpatient nutrition services for continuation of education as indicated or per pt preferences.     Please re-consult RD as indicated.

## 2023-09-17 NOTE — CARE PLAN
Problem: Post Op Day 1 CABG/Heart Valve Replacement  Goal: Optimal care of the post op CABG/heart valve replacement Post Op Day 1  Intervention: EKG and CXR completed  Note: Completed   Intervention: All valve patients: PT/INR daily  Note: NA  Intervention: Antibiotics are discontinued within 24 hours of anesthesia end time unless indication documented for continuation beyond 24 hours  Note: Completed  Intervention: Daily weights in the morning  Note: Completed  Intervention: Up in chair for all meals  Note: Up in chair for all meals  Intervention: Ambulate in am if stable. First ambulation 25 feet. Repeat x 3 as tolerated  Note: Ambulated x3  Intervention: Discontinue montalvo catheter unless documented reason for continuation  Note: Continue montalvo per APRN  Intervention: Assess surgical dressing and check provider orders for potential removal  Note: Dressings assessed and managed  Intervention: Ensure referal to intensive cardiac rehab is ordered, and smoking cessation education if appropriate  Note: Completed  Intervention: [facility] trained RN to remove chest tubes if ordered by provider  Note: Continue chest tubes per APRN  Intervention: IS q 1 hour while awake and record best IS volume  Note: IS best 2000  Intervention: Knee high ARMANI hose, on during the day, off at night  Note: ON  Intervention: Saline lock IV  Note: Saline locked

## 2023-09-17 NOTE — PROGRESS NOTES
4 Eyes Skin Assessment Completed by ARIANNA Topete and Kevan RN.    Head WDL  Ears WDL  Nose WDL  Mouth WDL  Neck WDL  Breast/Chest Incision, sternal incision covered with prevena, chest tubes removed today and sites covered with gauze and tape  Shoulder Blades WDL  Spine WDL  (R) Arm/Elbow/Hand WDL  (L) Arm/Elbow/Hand WDL  Abdomen WDL  Groin WDL  Scrotum/Coccyx/Buttocks WDL  (R) Leg Edema and Incision, harvest sites c/d/I and EFREN  (L) Leg Edema  (R) Heel/Foot/Toe WDL  (L) Heel/Foot/Toe WDL          Devices In Places Tele Box, Blood Pressure Cuff, Pulse Ox, Central Line, and Nasal Cannula      Interventions In Place Gray Ear Foams, Chair Waffle, Pillows, Heels Loaded W/Pillows, and Pressure Redistribution Mattress    Possible Skin Injury No    Pictures Uploaded Into Epic N/A  Wound Consult Placed N/A  RN Wound Prevention Protocol Ordered No

## 2023-09-17 NOTE — FLOWSHEET NOTE
09/17/23 1203   Bronchodilator Protocol   Med Order With RCP No   Is this an exacerbation of COPD/Asthma? No   Bronchodilator Indications Physical Exam / Hyperinflation / Wheezing (bronchospasm)   Breath Sounds Criteria 0    Benefit Criteria 1    Pulse Criteria 0    Respiratory Rate Criteria 0    S.O.B. Criteria 1    Criteria Total 2   Point Values 0-3 - PRN   Bronchodilator Goals/Outcome Improved Patient Appearance with Decreased use of Accessory Muscles

## 2023-09-18 LAB
ANION GAP SERPL CALC-SCNC: 7 MMOL/L (ref 7–16)
BUN SERPL-MCNC: 15 MG/DL (ref 8–22)
CALCIUM SERPL-MCNC: 8.3 MG/DL (ref 8.5–10.5)
CHLORIDE SERPL-SCNC: 103 MMOL/L (ref 96–112)
CO2 SERPL-SCNC: 24 MMOL/L (ref 20–33)
CREAT SERPL-MCNC: 0.84 MG/DL (ref 0.5–1.4)
EKG IMPRESSION: NORMAL
EKG IMPRESSION: NORMAL
ERYTHROCYTE [DISTWIDTH] IN BLOOD BY AUTOMATED COUNT: 47 FL (ref 35.9–50)
GFR SERPLBLD CREATININE-BSD FMLA CKD-EPI: 96 ML/MIN/1.73 M 2
GLUCOSE BLD STRIP.AUTO-MCNC: 172 MG/DL (ref 65–99)
GLUCOSE BLD STRIP.AUTO-MCNC: 173 MG/DL (ref 65–99)
GLUCOSE BLD STRIP.AUTO-MCNC: 184 MG/DL (ref 65–99)
GLUCOSE BLD STRIP.AUTO-MCNC: 228 MG/DL (ref 65–99)
GLUCOSE BLD STRIP.AUTO-MCNC: 264 MG/DL (ref 65–99)
GLUCOSE SERPL-MCNC: 190 MG/DL (ref 65–99)
HCT VFR BLD AUTO: 25.2 % (ref 42–52)
HGB BLD-MCNC: 8.5 G/DL (ref 14–18)
MCH RBC QN AUTO: 31.1 PG (ref 27–33)
MCHC RBC AUTO-ENTMCNC: 33.7 G/DL (ref 32.3–36.5)
MCV RBC AUTO: 92.3 FL (ref 81.4–97.8)
PLATELET # BLD AUTO: 144 K/UL (ref 164–446)
PMV BLD AUTO: 11.1 FL (ref 9–12.9)
POTASSIUM SERPL-SCNC: 3.9 MMOL/L (ref 3.6–5.5)
RBC # BLD AUTO: 2.73 M/UL (ref 4.7–6.1)
SODIUM SERPL-SCNC: 134 MMOL/L (ref 135–145)
WBC # BLD AUTO: 7.5 K/UL (ref 4.8–10.8)

## 2023-09-18 PROCEDURE — 85027 COMPLETE CBC AUTOMATED: CPT

## 2023-09-18 PROCEDURE — 93010 ELECTROCARDIOGRAM REPORT: CPT | Performed by: INTERNAL MEDICINE

## 2023-09-18 PROCEDURE — 700111 HCHG RX REV CODE 636 W/ 250 OVERRIDE (IP): Mod: JZ | Performed by: NURSE PRACTITIONER

## 2023-09-18 PROCEDURE — 94669 MECHANICAL CHEST WALL OSCILL: CPT

## 2023-09-18 PROCEDURE — 97535 SELF CARE MNGMENT TRAINING: CPT

## 2023-09-18 PROCEDURE — 82962 GLUCOSE BLOOD TEST: CPT | Mod: 91

## 2023-09-18 PROCEDURE — A9270 NON-COVERED ITEM OR SERVICE: HCPCS | Performed by: NURSE PRACTITIONER

## 2023-09-18 PROCEDURE — 94660 CPAP INITIATION&MGMT: CPT

## 2023-09-18 PROCEDURE — 97161 PT EVAL LOW COMPLEX 20 MIN: CPT

## 2023-09-18 PROCEDURE — 99024 POSTOP FOLLOW-UP VISIT: CPT | Performed by: NURSE PRACTITIONER

## 2023-09-18 PROCEDURE — 700102 HCHG RX REV CODE 250 W/ 637 OVERRIDE(OP): Performed by: NURSE PRACTITIONER

## 2023-09-18 PROCEDURE — 97165 OT EVAL LOW COMPLEX 30 MIN: CPT

## 2023-09-18 PROCEDURE — 80048 BASIC METABOLIC PNL TOTAL CA: CPT

## 2023-09-18 PROCEDURE — 770020 HCHG ROOM/CARE - TELE (206)

## 2023-09-18 RX ADMIN — METOPROLOL TARTRATE 25 MG: 25 TABLET, FILM COATED ORAL at 18:00

## 2023-09-18 RX ADMIN — TAMSULOSIN HYDROCHLORIDE 0.4 MG: 0.4 CAPSULE ORAL at 08:12

## 2023-09-18 RX ADMIN — ACETAMINOPHEN 1000 MG: 500 TABLET, FILM COATED ORAL at 13:11

## 2023-09-18 RX ADMIN — Medication 1 APPLICATOR: at 21:24

## 2023-09-18 RX ADMIN — INSULIN LISPRO 5 UNITS: 100 INJECTION, SOLUTION INTRAVENOUS; SUBCUTANEOUS at 21:26

## 2023-09-18 RX ADMIN — INSULIN LISPRO 7 UNITS: 100 INJECTION, SOLUTION INTRAVENOUS; SUBCUTANEOUS at 08:16

## 2023-09-18 RX ADMIN — INSULIN LISPRO 2 UNITS: 100 INJECTION, SOLUTION INTRAVENOUS; SUBCUTANEOUS at 13:14

## 2023-09-18 RX ADMIN — ENOXAPARIN SODIUM 40 MG: 100 INJECTION SUBCUTANEOUS at 18:02

## 2023-09-18 RX ADMIN — POTASSIUM CHLORIDE 40 MEQ: 1500 TABLET, EXTENDED RELEASE ORAL at 05:39

## 2023-09-18 RX ADMIN — Medication 1 APPLICATOR: at 08:09

## 2023-09-18 RX ADMIN — CLOPIDOGREL BISULFATE 75 MG: 75 TABLET ORAL at 05:37

## 2023-09-18 RX ADMIN — OXYCODONE HYDROCHLORIDE 5 MG: 5 TABLET ORAL at 08:12

## 2023-09-18 RX ADMIN — OMEPRAZOLE 20 MG: 20 CAPSULE, DELAYED RELEASE ORAL at 05:38

## 2023-09-18 RX ADMIN — METOPROLOL TARTRATE 25 MG: 25 TABLET, FILM COATED ORAL at 05:38

## 2023-09-18 RX ADMIN — ACETAMINOPHEN 1000 MG: 500 TABLET, FILM COATED ORAL at 05:37

## 2023-09-18 RX ADMIN — ASPIRIN 81 MG: 81 TABLET, COATED ORAL at 05:37

## 2023-09-18 RX ADMIN — FUROSEMIDE 40 MG: 10 INJECTION INTRAMUSCULAR; INTRAVENOUS at 05:38

## 2023-09-18 RX ADMIN — INSULIN LISPRO 7 UNITS: 100 INJECTION, SOLUTION INTRAVENOUS; SUBCUTANEOUS at 13:13

## 2023-09-18 RX ADMIN — ACETAMINOPHEN 1000 MG: 500 TABLET, FILM COATED ORAL at 18:00

## 2023-09-18 RX ADMIN — INSULIN LISPRO 7 UNITS: 100 INJECTION, SOLUTION INTRAVENOUS; SUBCUTANEOUS at 18:03

## 2023-09-18 RX ADMIN — INSULIN LISPRO 3 UNITS: 100 INJECTION, SOLUTION INTRAVENOUS; SUBCUTANEOUS at 18:02

## 2023-09-18 RX ADMIN — ATORVASTATIN CALCIUM 40 MG: 40 TABLET, FILM COATED ORAL at 21:24

## 2023-09-18 RX ADMIN — INSULIN LISPRO 2 UNITS: 100 INJECTION, SOLUTION INTRAVENOUS; SUBCUTANEOUS at 08:16

## 2023-09-18 ASSESSMENT — COGNITIVE AND FUNCTIONAL STATUS - GENERAL
SUGGESTED CMS G CODE MODIFIER DAILY ACTIVITY: CJ
PERSONAL GROOMING: A LITTLE
DRESSING REGULAR UPPER BODY CLOTHING: A LITTLE
DAILY ACTIVITIY SCORE: 21
SUGGESTED CMS G CODE MODIFIER MOBILITY: CH
MOBILITY SCORE: 24
HELP NEEDED FOR BATHING: A LITTLE

## 2023-09-18 ASSESSMENT — GAIT ASSESSMENTS
DISTANCE (FEET): 500
GAIT LEVEL OF ASSIST: SUPERVISED

## 2023-09-18 ASSESSMENT — ACTIVITIES OF DAILY LIVING (ADL): TOILETING: INDEPENDENT

## 2023-09-18 ASSESSMENT — FIBROSIS 4 INDEX: FIB4 SCORE: 1.3

## 2023-09-18 NOTE — CARE PLAN
The patient is Watcher - Medium risk of patient condition declining or worsening    Shift Goals  Clinical Goals: mobility, IS, wean O2, incision care  Patient Goals: walk, get off oxygen  Family Goals: RONAN    Progress made toward(s) clinical / shift goals:      Problem: Post op day 2 CABG/Heart Valve Replacement  Goal: Optimal care of the post op CABG/heart valve replacement post op day 2  Outcome: Progressing  Intervention: FSBS: when 2 consecutive BS < 130 after post op day 2, discontinue FSBS unless patient is insulin dependent diabetic  Note: Pt continues to be on sliding scale  Intervention: Daily weights in the morning  Note: Daily weight complete  Intervention: Up in chair for all meals  Note: Pt up to chair for entire shift  Intervention: Ambulate 4 times daily, increasing the distance each time  Note: Pt completed all walks for this shift, pt progressed to no longer using walker and completing a full lap around the unit  Intervention: Stand at sink and wash up with assistance.  Clean incisions twice daily with soap and water.  Note: Incision care completed on leg, sternum is covered with prevena  Intervention: IS q 1 hour while awake and record best IS volume  Note: Pt is motivated to use IS and pulls 2000  Intervention: Consider pacer wire removal by MD  Note: Pacer wires removed prior to transferring to tele  Intervention: Consider removal of montalvo and chest tube if not already done  Note: Montalvo and chest tubes removed       Patient is not progressing towards the following goals:

## 2023-09-18 NOTE — THERAPY
Occupational Therapy   Initial Evaluation     Patient Name: Yadiel Moody  Age:  67 y.o., Sex:  male  Medical Record #: [id number]  Today's Date: 9/18/2023     Precautions  Precautions: Fall Risk, Sternal Precautions (See Comments), Cardiac Precautions (See Comments)  Comments: prevena vac    Assessment  Patient is 67 y.o. male s/p CABG x3 and post op anemia. Completed ADLs/txfs with SBA-SPV and functional ambulation w/o AD and min v/cs for following sternal precautions. Reports wife is home and can assist PRN 24/7. Patient will not be actively followed for occupational therapy services at this time, however may be seen if requested by physician for 1 more visit within 30 days to address any discharge or equipment needs.     Plan    Occupational Therapy Initial Treatment Plan   Duration: Discharge Needs Only    DC Equipment Recommendations: None  Discharge Recommendations: Anticipate that the patient will have no further occupational therapy needs after discharge from the hospital (as long as family can assist PRN)      Objective     09/18/23 0909   Prior Living Situation   Prior Services Home-Independent   Housing / Facility 1 Story House   Steps Into Home 3   Bathroom Set up Walk In Shower;Shower Chair   Equipment Owned Tub / Shower Seat   Lives with - Patient's Self Care Capacity Spouse   Comments Reports wife is home and can assist PRN 24/7.   Prior Level of ADL Function   Self Feeding Independent   Grooming / Hygiene Independent   Bathing Independent   Dressing Independent   Toileting Independent   Prior Level of IADL Function   Medication Management Independent   Laundry Independent   Kitchen Mobility Independent   Finances Independent   Home Management Independent   Shopping Independent   Prior Level Of Mobility Independent Without Device in Community;Independent Without Device in Home   Driving / Transportation Driving Independent   Occupation (Pre-Hospital Vocational) Retired Due To Age   Precautions    Precautions Fall Risk;Sternal Precautions (See Comments);Cardiac Precautions (See Comments)   Comments prevena vac   Vitals   O2 Delivery Device Room air w/o2 available   Vitals Comments no c/o SOB or dizziness   Pain 0 - 10 Group   Location Sternum;Incision   Therapist Pain Assessment Post Activity;During Activity;Nurse Notified   Cognition    Cognition / Consciousness WDL   Level of Consciousness Alert   Comments very pleasant and cooperative; receptive to education   Passive ROM Upper Body   Passive ROM Upper Body WDL   Active ROM Upper Body   Active ROM Upper Body  WDL   Comments limited by precautions   Strength Upper Body   Upper Body Strength  X   Comments limited by precautions   Balance Assessment   Sitting Balance (Static) Good   Sitting Balance (Dynamic) Fair +   Standing Balance (Static) Fair +   Standing Balance (Dynamic) Fair   Weight Shift Sitting Good   Weight Shift Standing Good   Comments w/o AD in room   Bed Mobility    Supine to Sit Standby Assist   Sit to Supine Supervised   Scooting Supervised   Rolling Standby Assist   Comments v/cs for proper logroll technique   ADL Assessment   Eating Supervision   Grooming   (declined need; reports has been completing w/o assist)   Lower Body Dressing Supervision   Toileting Supervision   Comments Educated pt on sternal precautions, adaptive techniques for ADLs/txfs, home safety, talk test during functional mobility, pillow splinting, pacing, and importance of continued OOB activity. Provided and educated on OHS packet.   Functional Mobility   Sit to Stand Standby Assist   Bed, Chair, Wheelchair Transfer Supervised   Toilet Transfers Standby Assist   Transfer Method Stand Step   Mobility w/o AD; min v/cs for sternal precautions   Edema / Skin Assessment   Edema / Skin  Not Assessed   Comments wound vac in place pre/post session   Activity Tolerance   Comments no c/o fatigue/pain   Education Group   Education Provided Role of Occupational Therapist;Sternal  Precautions;Pathology of bedrest   Role of Occupational Therapist Patient Response Patient;Acceptance;Explanation;Verbal Demonstration;Reinforcement Needed   Sternal Precautions Patient Response Patient;Acceptance;Explanation;Verbal Demonstration;Reinforcement Needed;Handout;Demonstration   Pathology of Bedrest Patient Response Patient;Acceptance;Explanation;Verbal Demonstration;Reinforcement Needed

## 2023-09-18 NOTE — PROGRESS NOTES
Cardiovascular Surgery Progress Note    Name: Yadiel Moody  MRN: [id number]  : 1955  Admit Date: 9/15/2023  5:01 AM  3 Days Post-Op     Procedure:  Procedure(s) and Anesthesia Type:     * Coronary artery bypass grafting x3  Left internal mammary artery to left anterior descending artery  Reversed saphenous vein graft to obtuse marginal artery  Reversed saphenous vein graft to distal right coronary artery  Right coronary artery endarterectomy  Endo-vein procurement    Vitals:  Vitals:    23 0400 23 0538 23 0656 23 0740   BP: 103/64 105/64  105/64   Pulse: 88 90 86 82   Resp: 18  18 18   Temp: 37.4 °C (99.3 °F)   37 °C (98.6 °F)   TempSrc: Temporal   Temporal   SpO2: 92%  94% 92%   Weight: 103 kg (226 lb 3.1 oz)      Height:          Temp (24hrs), Av.2 °C (99 °F), Min:36.7 °C (98.1 °F), Max:38.3 °C (100.9 °F)      Respiratory:    Respiration: 18, Pulse Oximetry: 92 %       Fluids:    Intake/Output Summary (Last 24 hours) at 2023 1059  Last data filed at 2023 0740  Gross per 24 hour   Intake 960 ml   Output 1390 ml   Net -430 ml       Admit weight: Weight: 96.5 kg (212 lb 11.9 oz)  Current weight: Weight: 103 kg (226 lb 3.1 oz) (23 0400)    Labs:  Recent Labs     23  0150 23  0027 23  0007   WBC 10.3 10.2 7.5   RBC 3.17* 2.80* 2.73*   HEMOGLOBIN 9.9* 8.6* 8.5*   HEMATOCRIT 27.2* 25.5* 25.2*   MCV 85.8 91.1 92.3   MCH 31.2 30.7 31.1   MCHC 36.4 33.7 33.7   RDW 41.0 47.4 47.0   PLATELETCT 152* 121* 144*   MPV 11.0 11.0 11.1       Recent Labs     23  0150 23  0500 23  0027 23  0007   SODIUM 140  --  134* 134*   POTASSIUM 4.0 4.3 4.0 3.9   CHLORIDE 110  --  104 103   CO2 21  --  20 24   GLUCOSE 110*  --  242* 190*   BUN 14  --  14 15   CREATININE 0.82  --  0.94 0.84   CALCIUM 8.2*  --  8.3* 8.3*       Recent Labs     09/15/23  1410   APTT 42.3*   INR 1.30*             Medications:  Scheduled Medications   Medication Dose  Frequency    furosemide  40 mg Q DAY    potassium chloride SA  40 mEq DAILY    insulin GLARGINE  15 Units QAM INSULIN    And    insulin lispro  0.2 Units/kg/day TID AC    And    insulin lispro  2-9 Units 4X/DAY ACHS    Nozin nasal  swab  1 Applicator BID    metoprolol tartrate  25 mg BID    aspirin  81 mg DAILY    clopidogrel  75 mg DAILY    atorvastatin  40 mg QHS    acetaminophen  1,000 mg Q6HRS    senna-docusate  2 Tablet BID    And    polyethylene glycol/lytes  1 Packet DAILY    And    magnesium hydroxide  30 mL DAILY    omeprazole  20 mg DAILY    enoxaparin (LOVENOX) injection  40 mg DAILY AT 1800    tamsulosin  0.4 mg AFTER BREAKFAST        Exam:   Physical Exam  Vitals and nursing note reviewed.   Constitutional:       General: He is not in acute distress.     Appearance: Normal appearance.   HENT:      Head: Normocephalic.      Right Ear: External ear normal.      Left Ear: External ear normal.      Nose: Nose normal. No congestion.      Mouth/Throat:      Mouth: Mucous membranes are moist.      Pharynx: Oropharynx is clear.   Eyes:      Extraocular Movements: Extraocular movements intact.      Pupils: Pupils are equal, round, and reactive to light.   Cardiovascular:      Rate and Rhythm: Normal rate and regular rhythm.      Pulses: Normal pulses.      Heart sounds: Normal heart sounds.   Pulmonary:      Effort: Pulmonary effort is normal.      Breath sounds: Examination of the right-lower field reveals decreased breath sounds. Examination of the left-lower field reveals decreased breath sounds. Decreased breath sounds present.   Abdominal:      General: Bowel sounds are decreased. There is no distension.      Palpations: Abdomen is soft.   Musculoskeletal:      Cervical back: Normal range of motion and neck supple. No tenderness.      Right lower leg: Edema present.      Left lower leg: Edema present.   Skin:     General: Skin is warm and dry.      Comments: Midline surgical incision w prevena.    [facility] site- cdi   Neurological:      General: No focal deficit present.      Mental Status: He is alert and oriented to person, place, and time. Mental status is at baseline.   Psychiatric:         Mood and Affect: Mood normal.         Behavior: Behavior normal.         Thought Content: Thought content normal.         Cardiac Medications:    ASA - Yes    Plavix - Yes    Post-operative Beta Blockers - Yes    Ace/ARB- No; contraindicated because of Normal EF    Statin - Yes    Aldactone- No; contraindicated because of Normal EF    SGLT2i-  No contraindicated because of hypotension    Ejection Fraction:  65%    Telemetry:   9/16 SR  9/17 SR/ST  9/18 SR    Assessment/Plan:  POD 1  On norepi, SR, neuro intact, prevena in place, abdomen soft, fluid balance positive, wt up,  3L NC. 266 mL out of chest tubes overnight. Plan:  Keep chest tubes and pacing wires. Albumin this AM, wean norepi as able, IS/ambulate.    POD 2  HDS, SR/ST, neuro intact, prevena in place, abdomen soft, fluid balance positive, wt up,  2L NC. 123 mL out of chest tubes last night.  Plan: Diurese. Remove chest tubes, pacing wires, and montalvo catheter. IS/ambulate. Transfer to tele.     POD 3 HDS, SR, diuresing well continue, wean oxygen, amb/enc IS    Disposition:  TBD

## 2023-09-18 NOTE — CARE PLAN
The patient is Stable - Low risk of patient condition declining or worsening    Shift Goals  Clinical Goals: Mobility, IS, Wean 02, Pain Management  Patient Goals: Walks, Comfort  Family Goals: RONAN    Progress made toward(s) clinical / shift goals:        Problem: Post Op Day 3 CABG/Heart Valve replacement  Goal: Optimal care of the post op CABG/Heart Valve replacement post op day 3  Intervention: Daily weights in the morning  Note: Daily weights charted in Epic on stand up scale  Intervention: Shower daily and clean incisions twice daily with soap and water  Note: Patient ready for shower today, 9/18. Incision care education initiated and will follow shower.   Intervention: Up in chair for all meals  Note: Patient up in chair for all meals during day shift 9/17. Patient up in chair at beginning of this RN's shift and tolerating well.   Intervention: Ambulate 4 times daily, increasing the distance each time  Note: Patient ambulated twice during day shift and twice during this RN's shift. Patient tolerating walks well and increasing distance with each walk.   Intervention: IS q 1 hour while awake and record best IS volume  Note: Patient utilizing IS and teach back effective. Patient reaching 2000 and motivated to move up the scale to baseline of 4000.   Intervention: Consider removal of montalvo, chest tube and pacer wires if not already done  Note: Removal of chest tubes, pacer wires, and montalvo completed in [facility] before downgrading to Tele 7.       Problem: Respiratory  Goal: Patient will achieve/maintain optimum respiratory ventilation and gas exchange  Outcome: Progressing   Patient does not require 02 during ambulation but uses CPAP with 1L during night for sleep for NARCISO.        Patient is not progressing towards the following goals: NA

## 2023-09-18 NOTE — PROGRESS NOTES
Monitor Summary:     Rhythm: SR  Rate: 82-94  Ectopy: (f) PVC, Coup, Trigem  Measurements: 0.16/0.05/0.35           12 Hour Chart Check

## 2023-09-18 NOTE — THERAPY
Physical Therapy   Initial Evaluation     Patient Name: Yadiel Moody  Age:  67 y.o., Sex:  male  Medical Record #: [id number]  Today's Date: 9/18/2023     Precautions  Precautions: Fall Risk;Sternal Precautions (See Comments);Cardiac Precautions (See Comments)  Comments: prevena vac    Assessment  Patient is 67 y.o. male s/p 9/15/23 Coronary artery bypass grafting x3.  Additional factors influencing patient status / progress : today, pt is seen for phase I cardiac rehab education with topics covered including establishment of walking program with gradual increase in distance/time, talk test and RPE scale for pacing, sternal precautions and phase II introduced. Pt lives with his supportive spouse who will assist as needed. Packet was reviewed, questions answered. Pt showed mild HR increase with ambulation, no standing or seated rest needed during walk around entire unit or with stairs. No further inpt Pt needed. Pt will benefit from continued ambulation in hallway with nsg supervision. .      Plan         DC Equipment Recommendations: None  Discharge Recommendations: Other - (phase II cardiac rehab)            Objective       09/18/23 1256   Vitals   Pulse 90  (101 with ambulation)   Pulse Oximetry 94 %  (after walking on RA)   O2 Delivery Device None - Room Air   Pain 0 - 10 Group   Location Sternum   Therapist Pain Assessment During Activity;Nurse Notified  (not rated, sternum sore.)   Prior Living Situation   Prior Services Home-Independent   Housing / Facility 1 Story House   Steps Into Home 3   Steps In Home 0   Rail Right Rail (Steps into Home)   Lives with - Patient's Self Care Capacity Spouse  (home, supportive, drives. can assist as needed)   Prior Level of Functional Mobility   Bed Mobility Independent   Transfer Status Independent   Ambulation Independent   Ambulation Distance community   Assistive Devices Used None   Stairs Independent   Comments sleeps in flat bed, bought a wedge pillow.   Cognition     Cognition / Consciousness WDL   Level of Consciousness Alert   Strength Lower Body   Lower Body Strength  WDL   Balance Assessment   Sitting Balance (Static) Good   Sitting Balance (Dynamic) Fair +   Standing Balance (Static) Fair +   Standing Balance (Dynamic) Fair   Weight Shift Sitting Good   Weight Shift Standing Good   Comments no AD   Bed Mobility    Supine to Sit Standby Assist  (review of log roll, ed re sleeping in recliner, pt reports unable to sleep in recliner, wants bed.)   Sit to Supine Supervised   Scooting Supervised   Rolling Supervised   Gait Analysis   Gait Level Of Assist Supervised   Assistive Device None   Distance (Feet) 500   # of Times Distance was Traveled 1   # of Stairs Climbed 5   Level of Assist with Stairs Supervised   Functional Mobility   Sit to Stand Supervised   Bed, Chair, Wheelchair Transfer Supervised   Transfer Method Stand Step   How much difficulty does the patient currently have...   Turning over in bed (including adjusting bedclothes, sheets and blankets)? 4   Sitting down on and standing up from a chair with arms (e.g., wheelchair, bedside commode, etc.) 4   Moving from lying on back to sitting on the side of the bed? 4   How much help from another person does the patient currently need...   Moving to and from a bed to a chair (including a wheelchair)? 4   Need to walk in a hospital room? 4   Climbing 3-5 steps with a railing? 4   6 clicks Mobility Score 24   Activity Tolerance   Standing 10   Edema / Skin Assessment   Comments wound VAC sternum   Education Group   Education Provided Cardiac Precautions   Cardiac Precautions Patient Response Patient;Acceptance;Explanation;Handout;Verbal Demonstration;Action Demonstration   Additional Comments phase I cardiac rehab.   Anticipated Discharge Equipment and Recommendations   DC Equipment Recommendations None   Discharge Recommendations Other -  (phase II cardiac rehab)   Interdisciplinary Plan of Care Collaboration   IDT  Collaboration with  Nursing   Patient Position at End of Therapy Seated;Call Light within Reach;Tray Table within Reach;Phone within Reach   Collaboration Comments nsg updated   Session Information   Date / Session Number  9/18-1x only, dc needs only

## 2023-09-19 LAB
ANION GAP SERPL CALC-SCNC: 9 MMOL/L (ref 7–16)
BUN SERPL-MCNC: 15 MG/DL (ref 8–22)
CALCIUM SERPL-MCNC: 8.1 MG/DL (ref 8.5–10.5)
CHLORIDE SERPL-SCNC: 104 MMOL/L (ref 96–112)
CO2 SERPL-SCNC: 23 MMOL/L (ref 20–33)
CREAT SERPL-MCNC: 0.97 MG/DL (ref 0.5–1.4)
ERYTHROCYTE [DISTWIDTH] IN BLOOD BY AUTOMATED COUNT: 45.2 FL (ref 35.9–50)
GFR SERPLBLD CREATININE-BSD FMLA CKD-EPI: 86 ML/MIN/1.73 M 2
GLUCOSE BLD STRIP.AUTO-MCNC: 164 MG/DL (ref 65–99)
GLUCOSE BLD STRIP.AUTO-MCNC: 165 MG/DL (ref 65–99)
GLUCOSE BLD STRIP.AUTO-MCNC: 176 MG/DL (ref 65–99)
GLUCOSE BLD STRIP.AUTO-MCNC: 182 MG/DL (ref 65–99)
GLUCOSE BLD STRIP.AUTO-MCNC: 199 MG/DL (ref 65–99)
GLUCOSE SERPL-MCNC: 226 MG/DL (ref 65–99)
HCT VFR BLD AUTO: 24.7 % (ref 42–52)
HGB BLD-MCNC: 8.3 G/DL (ref 14–18)
MCH RBC QN AUTO: 30.4 PG (ref 27–33)
MCHC RBC AUTO-ENTMCNC: 33.6 G/DL (ref 32.3–36.5)
MCV RBC AUTO: 90.5 FL (ref 81.4–97.8)
PLATELET # BLD AUTO: 160 K/UL (ref 164–446)
PMV BLD AUTO: 10.9 FL (ref 9–12.9)
POTASSIUM SERPL-SCNC: 3.8 MMOL/L (ref 3.6–5.5)
RBC # BLD AUTO: 2.73 M/UL (ref 4.7–6.1)
SODIUM SERPL-SCNC: 136 MMOL/L (ref 135–145)
WBC # BLD AUTO: 6.8 K/UL (ref 4.8–10.8)

## 2023-09-19 PROCEDURE — 94660 CPAP INITIATION&MGMT: CPT

## 2023-09-19 PROCEDURE — 700102 HCHG RX REV CODE 250 W/ 637 OVERRIDE(OP): Mod: JZ | Performed by: NURSE PRACTITIONER

## 2023-09-19 PROCEDURE — 770020 HCHG ROOM/CARE - TELE (206)

## 2023-09-19 PROCEDURE — 85027 COMPLETE CBC AUTOMATED: CPT

## 2023-09-19 PROCEDURE — 700102 HCHG RX REV CODE 250 W/ 637 OVERRIDE(OP): Performed by: NURSE PRACTITIONER

## 2023-09-19 PROCEDURE — A9270 NON-COVERED ITEM OR SERVICE: HCPCS | Performed by: NURSE PRACTITIONER

## 2023-09-19 PROCEDURE — 94669 MECHANICAL CHEST WALL OSCILL: CPT

## 2023-09-19 PROCEDURE — A9270 NON-COVERED ITEM OR SERVICE: HCPCS | Mod: JZ | Performed by: NURSE PRACTITIONER

## 2023-09-19 PROCEDURE — 700111 HCHG RX REV CODE 636 W/ 250 OVERRIDE (IP): Mod: JZ | Performed by: NURSE PRACTITIONER

## 2023-09-19 PROCEDURE — 99024 POSTOP FOLLOW-UP VISIT: CPT | Performed by: NURSE PRACTITIONER

## 2023-09-19 PROCEDURE — 80048 BASIC METABOLIC PNL TOTAL CA: CPT

## 2023-09-19 PROCEDURE — 82962 GLUCOSE BLOOD TEST: CPT

## 2023-09-19 RX ORDER — METOLAZONE 5 MG/1
2.5 TABLET ORAL
Status: DISCONTINUED | OUTPATIENT
Start: 2023-09-19 | End: 2023-09-20 | Stop reason: HOSPADM

## 2023-09-19 RX ORDER — POTASSIUM CHLORIDE 20 MEQ/1
40 TABLET, EXTENDED RELEASE ORAL ONCE
Status: COMPLETED | OUTPATIENT
Start: 2023-09-19 | End: 2023-09-19

## 2023-09-19 RX ADMIN — INSULIN LISPRO 7 UNITS: 100 INJECTION, SOLUTION INTRAVENOUS; SUBCUTANEOUS at 13:34

## 2023-09-19 RX ADMIN — POTASSIUM CHLORIDE 40 MEQ: 1500 TABLET, EXTENDED RELEASE ORAL at 04:52

## 2023-09-19 RX ADMIN — ATORVASTATIN CALCIUM 40 MG: 40 TABLET, FILM COATED ORAL at 21:52

## 2023-09-19 RX ADMIN — INSULIN LISPRO 2 UNITS: 100 INJECTION, SOLUTION INTRAVENOUS; SUBCUTANEOUS at 17:50

## 2023-09-19 RX ADMIN — ACETAMINOPHEN 1000 MG: 500 TABLET, FILM COATED ORAL at 04:52

## 2023-09-19 RX ADMIN — ACETAMINOPHEN 1000 MG: 500 TABLET, FILM COATED ORAL at 17:59

## 2023-09-19 RX ADMIN — Medication 1 APPLICATOR: at 08:38

## 2023-09-19 RX ADMIN — INSULIN LISPRO 7 UNITS: 100 INJECTION, SOLUTION INTRAVENOUS; SUBCUTANEOUS at 08:35

## 2023-09-19 RX ADMIN — OMEPRAZOLE 20 MG: 20 CAPSULE, DELAYED RELEASE ORAL at 04:52

## 2023-09-19 RX ADMIN — SENNOSIDES AND DOCUSATE SODIUM 2 TABLET: 50; 8.6 TABLET ORAL at 06:00

## 2023-09-19 RX ADMIN — METOPROLOL TARTRATE 25 MG: 25 TABLET, FILM COATED ORAL at 04:52

## 2023-09-19 RX ADMIN — FUROSEMIDE 40 MG: 10 INJECTION INTRAMUSCULAR; INTRAVENOUS at 04:53

## 2023-09-19 RX ADMIN — TAMSULOSIN HYDROCHLORIDE 0.4 MG: 0.4 CAPSULE ORAL at 08:38

## 2023-09-19 RX ADMIN — INSULIN LISPRO 2 UNITS: 100 INJECTION, SOLUTION INTRAVENOUS; SUBCUTANEOUS at 21:59

## 2023-09-19 RX ADMIN — INSULIN LISPRO 2 UNITS: 100 INJECTION, SOLUTION INTRAVENOUS; SUBCUTANEOUS at 08:35

## 2023-09-19 RX ADMIN — ASPIRIN 81 MG: 81 TABLET, COATED ORAL at 04:52

## 2023-09-19 RX ADMIN — ACETAMINOPHEN 1000 MG: 500 TABLET, FILM COATED ORAL at 00:33

## 2023-09-19 RX ADMIN — CLOPIDOGREL BISULFATE 75 MG: 75 TABLET ORAL at 04:52

## 2023-09-19 RX ADMIN — INSULIN LISPRO 7 UNITS: 100 INJECTION, SOLUTION INTRAVENOUS; SUBCUTANEOUS at 17:53

## 2023-09-19 RX ADMIN — ACETAMINOPHEN 1000 MG: 500 TABLET, FILM COATED ORAL at 13:29

## 2023-09-19 RX ADMIN — METOPROLOL TARTRATE 25 MG: 25 TABLET, FILM COATED ORAL at 18:00

## 2023-09-19 RX ADMIN — METOLAZONE 2.5 MG: 5 TABLET ORAL at 13:29

## 2023-09-19 RX ADMIN — POTASSIUM CHLORIDE 40 MEQ: 1500 TABLET, EXTENDED RELEASE ORAL at 13:29

## 2023-09-19 RX ADMIN — Medication 1 APPLICATOR: at 21:52

## 2023-09-19 RX ADMIN — INSULIN LISPRO 2 UNITS: 100 INJECTION, SOLUTION INTRAVENOUS; SUBCUTANEOUS at 13:35

## 2023-09-19 RX ADMIN — ENOXAPARIN SODIUM 40 MG: 100 INJECTION SUBCUTANEOUS at 17:58

## 2023-09-19 ASSESSMENT — FIBROSIS 4 INDEX: FIB4 SCORE: 1.17

## 2023-09-19 ASSESSMENT — PAIN DESCRIPTION - PAIN TYPE: TYPE: SURGICAL PAIN

## 2023-09-19 NOTE — PROGRESS NOTES
Cardiovascular Surgery Progress Note    Name: Yadiel Moody  MRN: [id number]  : 1955  Admit Date: 9/15/2023  5:01 AM  4 Days Post-Op     Procedure:  Procedure(s) and Anesthesia Type:     * Coronary artery bypass grafting x3  Left internal mammary artery to left anterior descending artery  Reversed saphenous vein graft to obtuse marginal artery  Reversed saphenous vein graft to distal right coronary artery  Right coronary artery endarterectomy  Endo-vein procurement    Vitals:  Vitals:    23 0452 23 0500 23 0700 23 0723   BP: 104/63   101/63   Pulse: 80  74 76   Resp:   18 18   Temp:    36.7 °C (98.1 °F)   TempSrc:    Temporal   SpO2:   96% 96%   Weight:  100 kg (220 lb 7.4 oz)     Height:          Temp (24hrs), Av.1 °C (98.7 °F), Min:36.7 °C (98.1 °F), Max:37.3 °C (99.1 °F)      Respiratory:    Respiration: 18, Pulse Oximetry: 96 %       Fluids:    Intake/Output Summary (Last 24 hours) at 2023 1054  Last data filed at 2023 0800  Gross per 24 hour   Intake 840 ml   Output 2050 ml   Net -1210 ml       Admit weight: Weight: 96.5 kg (212 lb 11.9 oz)  Current weight: Weight: 100 kg (220 lb 7.4 oz) (23 0500)    Labs:  Recent Labs     23  0027 23  0007 23  0036   WBC 10.2 7.5 6.8   RBC 2.80* 2.73* 2.73*   HEMOGLOBIN 8.6* 8.5* 8.3*   HEMATOCRIT 25.5* 25.2* 24.7*   MCV 91.1 92.3 90.5   MCH 30.7 31.1 30.4   MCHC 33.7 33.7 33.6   RDW 47.4 47.0 45.2   PLATELETCT 121* 144* 160*   MPV 11.0 11.1 10.9       Recent Labs     23  0027 23  0007 23  0036   SODIUM 134* 134* 136   POTASSIUM 4.0 3.9 3.8   CHLORIDE 104 103 104   CO2 20 24 23   GLUCOSE 242* 190* 226*   BUN 14 15 15   CREATININE 0.94 0.84 0.97   CALCIUM 8.3* 8.3* 8.1*                   Medications:  Scheduled Medications   Medication Dose Frequency    furosemide  40 mg Q DAY    potassium chloride SA  40 mEq DAILY    insulin GLARGINE  15 Units QAM INSULIN    And    insulin lispro  0.2  Units/kg/day TID AC    And    insulin lispro  2-9 Units 4X/DAY ACHS    Nozin nasal  swab  1 Applicator BID    metoprolol tartrate  25 mg BID    aspirin  81 mg DAILY    clopidogrel  75 mg DAILY    atorvastatin  40 mg QHS    acetaminophen  1,000 mg Q6HRS    senna-docusate  2 Tablet BID    And    polyethylene glycol/lytes  1 Packet DAILY    And    magnesium hydroxide  30 mL DAILY    omeprazole  20 mg DAILY    enoxaparin (LOVENOX) injection  40 mg DAILY AT 1800    tamsulosin  0.4 mg AFTER BREAKFAST        Exam:   Physical Exam  Vitals and nursing note reviewed.   Constitutional:       General: He is not in acute distress.     Appearance: Normal appearance.   HENT:      Head: Normocephalic.      Right Ear: External ear normal.      Left Ear: External ear normal.      Nose: Nose normal. No congestion.      Mouth/Throat:      Mouth: Mucous membranes are moist.      Pharynx: Oropharynx is clear.   Eyes:      Extraocular Movements: Extraocular movements intact.      Pupils: Pupils are equal, round, and reactive to light.   Cardiovascular:      Rate and Rhythm: Normal rate and regular rhythm.      Pulses: Normal pulses.      Heart sounds: Normal heart sounds.   Pulmonary:      Effort: Pulmonary effort is normal.      Breath sounds: Examination of the right-lower field reveals decreased breath sounds. Examination of the left-lower field reveals decreased breath sounds. Decreased breath sounds present.   Abdominal:      General: Bowel sounds are decreased. There is no distension.      Palpations: Abdomen is soft.   Musculoskeletal:      Cervical back: Normal range of motion and neck supple. No tenderness.      Right lower leg: Edema present.      Left lower leg: Edema present.   Skin:     General: Skin is warm and dry.      Comments: Midline surgical incision w prevena.   EVH site- cdi   Neurological:      General: No focal deficit present.      Mental Status: He is alert and oriented to person, place, and time. Mental  status is at baseline.   Psychiatric:         Mood and Affect: Mood normal.         Behavior: Behavior normal.         Thought Content: Thought content normal.         Cardiac Medications:    ASA - Yes    Plavix - Yes    Post-operative Beta Blockers - Yes    Ace/ARB- No; contraindicated because of Normal EF    Statin - Yes    Aldactone- No; contraindicated because of Normal EF    SGLT2i-  No contraindicated because of hypotension    Ejection Fraction:  65%    Telemetry:   9/16 SR  9/17 SR/ST  9/18 SR  9/19 SR    Assessment/Plan:  POD 1  On norepi, SR, neuro intact, prevena in place, abdomen soft, fluid balance positive, wt up,  3L NC. 266 mL out of chest tubes overnight. Plan:  Keep chest tubes and pacing wires. Albumin this AM, wean norepi as able, IS/ambulate.    POD 2  HDS, SR/ST, neuro intact, prevena in place, abdomen soft, fluid balance positive, wt up,  2L NC. 123 mL out of chest tubes last night.  Plan: Diurese. Remove chest tubes, pacing wires, and montalvo catheter. IS/ambulate. Transfer to [facility].     POD 3 HDS, SR, diuresing well continue, wean oxygen, amb/enc IS    POD 4 HDS. SR. Neuro intact.  Abdomen soft nontender weight-4kg up from admit.  Plan: DC prevena.  Increase diuresis.  Encourage IS/ambulation.   Home in AM.    Disposition:  PT OT no needs

## 2023-09-19 NOTE — CARE PLAN
"The patient is Stable - Low risk of patient condition declining or worsening    Shift Goals  Clinical Goals: Mobility, IS  Patient Goals: \"Discharge plan?\"  Family Goals: No family present    Progress made toward(s) clinical / shift goals: Pt ambulated 3 times throughout shift and up to chair for all meals. Pt shower completed. Pt able to demonstrate proper use of incentive spirometer. Daily weight completed.     Patient is not progressing towards the following goals:      Problem: Knowledge Deficit - Standard  Goal: Patient and family/care givers will demonstrate understanding of plan of care, disease process/condition, diagnostic tests and medications  Outcome: Progressing     Problem: Post Op Day 4 CABG/Heart Valve Replacement  Goal: Optimal care of the Post Op CABG/Heart Valve replacement Post Op Day 4  Outcome: Progressing     Problem: Skin Integrity  Goal: Skin integrity is maintained or improved  Outcome: Progressing     Problem: Fall Risk  Goal: Patient will remain free from falls  Outcome: Progressing     "

## 2023-09-19 NOTE — DISCHARGE PLANNING
Discharge Appointments/outpatient referrals/ and HH set up:    Cardiac surgery follow up appointment made for 4-5 weeks out    Hospital discharge team/schedulers called for cardiology f/u appointment for MD or APRN within 3-4 weeks if possible.    Aortic surveillance program/vascular medicine referral not needed, orders not placed.    Anticoagulation referral/ coumadin clinic referral not needed and orders not placed .    INR draws are not indicated for CABG procedure and clopidogrel.    PT/OT notes not recommending placement or DC needs that require orders.

## 2023-09-19 NOTE — PROGRESS NOTES
Bedside report received, pt care assumed, tele box on. VSS, pt assessment complete. Pt aaox4, no signs of distress noted at this time. POC discussed with pt and verbalizes no questions.Pt denies any additional needs at this time. Bed in lowest position, bed alarm on, pt educated on fall risk and verbalized understanding, call light within reach.

## 2023-09-19 NOTE — CARE PLAN
The patient is Stable - Low risk of patient condition declining or worsening    Shift Goals  Clinical Goals: Mobility, IS, comfort  Patient Goals: comfort  Family Goals: peewee    Progress made toward(s) clinical / shift goals:       Problem: Post Op Day 4 CABG/Heart Valve Replacement  Goal: Optimal care of the Post Op CABG/Heart Valve replacement Post Op Day 4  9/19/2023 0331 by RBISA OrtizN.  Outcome: Progressing  9/19/2023 0303 by Tita Galvan R.N.  Outcome: Progressing  Intervention: Daily weights in the morning  Note: Patient weighed at 0400, charted in Epic.  Intervention: Shower daily and clean incisions twice daily with soap and water  Note: Patient showered during day shift of 9/18. CHG performed during this RN shift.   Intervention: Up in chair for all meals  Note: Patient up in chair for all meals and tolerated well.   Intervention: Ambulate 4 times daily, increasing the distance each time  Note: Patient ambulated in hallway 4 times and was able to increase distance each time.  Intervention: IS q 1 hour while awake and record best IS volume  Note: Patient performing IS at 2000 without reminders.   Intervention: Consider removal of montalvo, chest tube and pacer wires if not already done  Note: Montalvo, chest tube and pacer wires removed in [facility].   Intervention: Discharge Education  Note: Discharge planning initiated with this RN regarding ambulation, IS, medication compliance, diet     Problem: Respiratory  Goal: Patient will achieve/maintain optimum respiratory ventilation and gas exchange  Outcome: Progressing  Flowsheets (Taken 9/19/2023 0000 by Fadia Blackman)  O2 Delivery Device: None - Room Air  Note: Patient tolerating room air during ambulation and at rest. Still requiring 1L with CPAP during sleep to maintain O2 saturation >92%.        Patient is not progressing towards the following goals:N/A

## 2023-09-19 NOTE — PROGRESS NOTES
Monitor Summary:     Rhythm: SR  Rate: 74-89   Ectopy: (o) PVC, Coup, Bigem, Trigem  Measurements: 0.14/0.05/0.36           12 Hour Chart Check

## 2023-09-19 NOTE — PROGRESS NOTES
Bedside report received from day RN; assumed pt care. Pt assessment complete. Pt A&Ox4. Reviewed plan of care with pt. Pt on tele. Pt on room air. All needs met at this time. Chart and labs reviewed. Bed in lowest position, and 2 side rails up. Pt educated on call light; call light with in reach.

## 2023-09-20 ENCOUNTER — PHARMACY VISIT (OUTPATIENT)
Dept: PHARMACY | Facility: MEDICAL CENTER | Age: 68
End: 2023-09-20
Payer: COMMERCIAL

## 2023-09-20 VITALS
DIASTOLIC BLOOD PRESSURE: 68 MMHG | HEART RATE: 87 BPM | HEIGHT: 69 IN | WEIGHT: 215.17 LBS | TEMPERATURE: 98.8 F | RESPIRATION RATE: 16 BRPM | BODY MASS INDEX: 31.87 KG/M2 | OXYGEN SATURATION: 94 % | SYSTOLIC BLOOD PRESSURE: 112 MMHG

## 2023-09-20 PROBLEM — D69.6 THROMBOCYTOPENIA (HCC): Status: RESOLVED | Noted: 2023-09-17 | Resolved: 2023-09-20

## 2023-09-20 PROBLEM — D62 ACUTE BLOOD LOSS AS CAUSE OF POSTOPERATIVE ANEMIA: Status: RESOLVED | Noted: 2023-09-15 | Resolved: 2023-09-20

## 2023-09-20 PROBLEM — E83.51 HYPOCALCEMIA: Status: RESOLVED | Noted: 2023-09-16 | Resolved: 2023-09-20

## 2023-09-20 PROBLEM — Z99.11 ENCOUNTER FOR WEANING FROM VENTILATOR (HCC): Status: RESOLVED | Noted: 2023-09-15 | Resolved: 2023-09-20

## 2023-09-20 LAB
ANION GAP SERPL CALC-SCNC: 10 MMOL/L (ref 7–16)
BUN SERPL-MCNC: 14 MG/DL (ref 8–22)
CALCIUM SERPL-MCNC: 8.6 MG/DL (ref 8.5–10.5)
CHLORIDE SERPL-SCNC: 103 MMOL/L (ref 96–112)
CO2 SERPL-SCNC: 23 MMOL/L (ref 20–33)
CREAT SERPL-MCNC: 0.93 MG/DL (ref 0.5–1.4)
ERYTHROCYTE [DISTWIDTH] IN BLOOD BY AUTOMATED COUNT: 44 FL (ref 35.9–50)
GFR SERPLBLD CREATININE-BSD FMLA CKD-EPI: 90 ML/MIN/1.73 M 2
GLUCOSE BLD STRIP.AUTO-MCNC: 151 MG/DL (ref 65–99)
GLUCOSE BLD STRIP.AUTO-MCNC: 180 MG/DL (ref 65–99)
GLUCOSE BLD STRIP.AUTO-MCNC: 238 MG/DL (ref 65–99)
GLUCOSE SERPL-MCNC: 167 MG/DL (ref 65–99)
HCT VFR BLD AUTO: 24.8 % (ref 42–52)
HGB BLD-MCNC: 8.4 G/DL (ref 14–18)
MCH RBC QN AUTO: 30.9 PG (ref 27–33)
MCHC RBC AUTO-ENTMCNC: 33.9 G/DL (ref 32.3–36.5)
MCV RBC AUTO: 91.2 FL (ref 81.4–97.8)
PLATELET # BLD AUTO: 188 K/UL (ref 164–446)
PMV BLD AUTO: 10.6 FL (ref 9–12.9)
POTASSIUM SERPL-SCNC: 3.7 MMOL/L (ref 3.6–5.5)
RBC # BLD AUTO: 2.72 M/UL (ref 4.7–6.1)
SODIUM SERPL-SCNC: 136 MMOL/L (ref 135–145)
WBC # BLD AUTO: 5.9 K/UL (ref 4.8–10.8)

## 2023-09-20 PROCEDURE — 700111 HCHG RX REV CODE 636 W/ 250 OVERRIDE (IP): Mod: JZ | Performed by: NURSE PRACTITIONER

## 2023-09-20 PROCEDURE — RXMED WILLOW AMBULATORY MEDICATION CHARGE: Performed by: NURSE PRACTITIONER

## 2023-09-20 PROCEDURE — A9270 NON-COVERED ITEM OR SERVICE: HCPCS | Mod: JZ | Performed by: NURSE PRACTITIONER

## 2023-09-20 PROCEDURE — A9270 NON-COVERED ITEM OR SERVICE: HCPCS | Performed by: NURSE PRACTITIONER

## 2023-09-20 PROCEDURE — 306591 TRAY SUTURE REMOVAL DISP: Performed by: THORACIC SURGERY (CARDIOTHORACIC VASCULAR SURGERY)

## 2023-09-20 PROCEDURE — 99024 POSTOP FOLLOW-UP VISIT: CPT | Performed by: NURSE PRACTITIONER

## 2023-09-20 PROCEDURE — 700102 HCHG RX REV CODE 250 W/ 637 OVERRIDE(OP): Mod: JZ | Performed by: NURSE PRACTITIONER

## 2023-09-20 PROCEDURE — 94669 MECHANICAL CHEST WALL OSCILL: CPT

## 2023-09-20 PROCEDURE — 85027 COMPLETE CBC AUTOMATED: CPT

## 2023-09-20 PROCEDURE — 80048 BASIC METABOLIC PNL TOTAL CA: CPT

## 2023-09-20 PROCEDURE — 82962 GLUCOSE BLOOD TEST: CPT | Mod: 91

## 2023-09-20 PROCEDURE — 700102 HCHG RX REV CODE 250 W/ 637 OVERRIDE(OP): Performed by: NURSE PRACTITIONER

## 2023-09-20 RX ORDER — FUROSEMIDE 20 MG/1
20 TABLET ORAL DAILY
Qty: 30 TABLET | Refills: 0 | Status: SHIPPED | OUTPATIENT
Start: 2023-09-20 | End: 2023-10-20

## 2023-09-20 RX ORDER — TRAMADOL HYDROCHLORIDE 50 MG/1
50 TABLET ORAL EVERY 6 HOURS PRN
Qty: 56 TABLET | Refills: 0 | Status: SHIPPED | OUTPATIENT
Start: 2023-09-20 | End: 2023-10-04

## 2023-09-20 RX ORDER — POTASSIUM CHLORIDE 750 MG/1
10 TABLET, EXTENDED RELEASE ORAL DAILY
Qty: 30 TABLET | Refills: 0 | Status: SHIPPED | OUTPATIENT
Start: 2023-09-20 | End: 2023-10-20

## 2023-09-20 RX ORDER — ASPIRIN 81 MG/1
81 TABLET ORAL DAILY
Qty: 30 TABLET | Refills: 2 | Status: SHIPPED | OUTPATIENT
Start: 2023-09-21

## 2023-09-20 RX ORDER — GLUCOSAMINE HCL/CHONDROITIN SU 500-400 MG
CAPSULE ORAL
Qty: 100 EACH | Refills: 0 | Status: SHIPPED | OUTPATIENT
Start: 2023-09-20

## 2023-09-20 RX ORDER — CLOPIDOGREL BISULFATE 75 MG/1
75 TABLET ORAL DAILY
Qty: 30 TABLET | Refills: 2 | Status: SHIPPED | OUTPATIENT
Start: 2023-09-21

## 2023-09-20 RX ORDER — DIPHENHYDRAMINE HYDROCHLORIDE 25 MG/1
CAPSULE, LIQUID FILLED ORAL
Qty: 1 KIT | Refills: 0 | Status: SHIPPED | OUTPATIENT
Start: 2023-09-20

## 2023-09-20 RX ORDER — LANCETS 30 GAUGE
EACH MISCELLANEOUS
Qty: 100 EACH | Refills: 0 | Status: SHIPPED | OUTPATIENT
Start: 2023-09-20

## 2023-09-20 RX ORDER — INSULIN GLARGINE 100 [IU]/ML
15 INJECTION, SOLUTION SUBCUTANEOUS EVERY EVENING
Qty: 3 ML | Refills: 5 | Status: ACTIVE | OUTPATIENT
Start: 2023-09-20

## 2023-09-20 RX ADMIN — POTASSIUM CHLORIDE 40 MEQ: 1500 TABLET, EXTENDED RELEASE ORAL at 05:43

## 2023-09-20 RX ADMIN — Medication 1 APPLICATOR: at 09:15

## 2023-09-20 RX ADMIN — ACETAMINOPHEN 1000 MG: 500 TABLET, FILM COATED ORAL at 12:34

## 2023-09-20 RX ADMIN — ACETAMINOPHEN 1000 MG: 500 TABLET, FILM COATED ORAL at 05:42

## 2023-09-20 RX ADMIN — TAMSULOSIN HYDROCHLORIDE 0.4 MG: 0.4 CAPSULE ORAL at 09:53

## 2023-09-20 RX ADMIN — METOLAZONE 2.5 MG: 5 TABLET ORAL at 05:42

## 2023-09-20 RX ADMIN — INSULIN LISPRO 3 UNITS: 100 INJECTION, SOLUTION INTRAVENOUS; SUBCUTANEOUS at 12:36

## 2023-09-20 RX ADMIN — CLOPIDOGREL BISULFATE 75 MG: 75 TABLET ORAL at 05:42

## 2023-09-20 RX ADMIN — OMEPRAZOLE 20 MG: 20 CAPSULE, DELAYED RELEASE ORAL at 05:43

## 2023-09-20 RX ADMIN — INSULIN LISPRO 2 UNITS: 100 INJECTION, SOLUTION INTRAVENOUS; SUBCUTANEOUS at 09:04

## 2023-09-20 RX ADMIN — INSULIN LISPRO 7 UNITS: 100 INJECTION, SOLUTION INTRAVENOUS; SUBCUTANEOUS at 09:05

## 2023-09-20 RX ADMIN — ASPIRIN 81 MG: 81 TABLET, COATED ORAL at 05:42

## 2023-09-20 RX ADMIN — ACETAMINOPHEN 1000 MG: 500 TABLET, FILM COATED ORAL at 00:04

## 2023-09-20 RX ADMIN — INSULIN LISPRO 7 UNITS: 100 INJECTION, SOLUTION INTRAVENOUS; SUBCUTANEOUS at 12:36

## 2023-09-20 RX ADMIN — FUROSEMIDE 40 MG: 10 INJECTION INTRAMUSCULAR; INTRAVENOUS at 05:43

## 2023-09-20 RX ADMIN — METOPROLOL TARTRATE 25 MG: 25 TABLET, FILM COATED ORAL at 05:43

## 2023-09-20 ASSESSMENT — PAIN DESCRIPTION - PAIN TYPE
TYPE: SURGICAL PAIN
TYPE: SURGICAL PAIN

## 2023-09-20 ASSESSMENT — PATIENT HEALTH QUESTIONNAIRE - PHQ9
2. FEELING DOWN, DEPRESSED, IRRITABLE, OR HOPELESS: NOT AT ALL
SUM OF ALL RESPONSES TO PHQ9 QUESTIONS 1 AND 2: 0
1. LITTLE INTEREST OR PLEASURE IN DOING THINGS: NOT AT ALL

## 2023-09-20 ASSESSMENT — FIBROSIS 4 INDEX: FIB4 SCORE: 1

## 2023-09-20 NOTE — DISCHARGE PLANNING
Meds-to-Beds: Discharge prescription orders listed below delivered to patient's bedside. RN Yoselyn notified. Patient counseled.  Patient elected to have co-payment billed to patient account.      Reviewed signs and symptoms of bleeding and when to seek medical attention. Reviewed potential drug-drug interactions.    Current Outpatient Medications   Medication Sig Dispense Refill    [START ON 9/21/2023] aspirin 81 MG EC tablet Take 1 Tablet by mouth every day. 30 Tablet 2    [START ON 9/21/2023] clopidogrel (PLAVIX) 75 MG Tab Take 1 Tablet by mouth every day. 30 Tablet 2    furosemide (LASIX) 20 MG Tab Take 1 Tablet by mouth every day for 30 days. 30 Tablet 0    potassium chloride SA (K-DUR) 10 MEQ Tab CR Take 1 Tablet by mouth every day for 30 days. 30 Tablet 0    traMADol (ULTRAM) 50 MG Tab Take 1 Tablet by mouth every 6 hours as needed for Moderate Pain or Severe Pain for up to 14 days. 56 Tablet 0    insulin glargine (LANTUS SOLOSTAR) 100 UNIT/ML Solution Pen-injector injection Inject 15 Units under the skin every evening. 3 mL 5    Blood Glucose Monitoring Suppl (ONE TOUCH VERIO FLEX) w/Device Kit Test blood sugar as recommended by provider. 1 Kit 0    ONE TOUCH VERIO glucose blood strip Use one strip to test blood sugar three times daily before meals. 100 Strip 0    ONE TOUCH DELICA 30G Lancets Use one lancet to test blood sugar three times daily before meals. 100 Each 0    Alcohol Swabs Wipe site with prep pad prior to injection. 100 Each 0    Insulin Pen Needle 32 G x 4 mm Use one pen needle in pen device to inject insulin three times daily. 100 Each 0    metFORMIN (GLUCOPHAGE) 500 MG Tab Take 1 Tablet by mouth 2 times a day with meals. 60 Tablet 2      Ela Kaur, PharmD

## 2023-09-20 NOTE — CARE PLAN
The patient is Stable - Low risk of patient condition declining or worsening    Shift Goals  Clinical Goals: mobilize, IS, VSS  Patient Goals: Home  Family Goals: not present    Progress made toward(s) clinical / shift goals:      Problem: Knowledge Deficit - Standard  Goal: Patient and family/care givers will demonstrate understanding of plan of care, disease process/condition, diagnostic tests and medications  Outcome: Progressing     Problem: Post Op Day 5 CABG/Heart Valve Replacement  Goal: Optimal care of the Post Op CABG/Heart Valve replacement Post Op Day 5  Outcome: Progressing     Problem: Hemodynamics  Goal: Patient's hemodynamics, fluid balance and neurologic status will be stable or improve  Outcome: Progressing     Problem: Respiratory  Goal: Patient will achieve/maintain optimum respiratory ventilation and gas exchange  Outcome: Progressing     Problem: Self Care  Goal: Patient will have the ability to perform ADLs independently or with assistance (bathe, groom, dress, toilet and feed)  Outcome: Progressing       Patient verbalized an understanding of condition, surgery, treatment plan, and medications being administered. Patient ambulated two times throughout shift with standby assistance. Patient is tolerating activity well and remains on room air. Prevena wound vac in place on sternal incision and chest tube sites are open to air (clean, dry, and intact). Patient is hitting 2,000 on IS and motivated to improve. Patient able to complete all ADLs. Patient's vital signs and labs have remained stable throughout the night

## 2023-09-20 NOTE — DISCHARGE SUMMARY
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, overweight, NARCISO    DISCHARGE DIAGNOSES: multivessel CAD including chronic total occlusion of RCA and circumflex arteries with history of NSTEMI currently with stable angina, HTN, HLD, DM2 secondary to previous pancreatitis, overweight, NARCISO, DM    PROCEDURES PERFORMED: Dr Mann 9/15/23   Coronary artery bypass grafting x3  Left internal mammary artery to left anterior descending artery  Reversed saphenous vein graft to obtuse marginal artery  Reversed saphenous vein graft to distal right coronary artery  Right coronary artery endarterectomy  Endo-vein procurement    HISTORY OF PRESENT ILLNESS:  The patient is a 67 y.o. male with a past medical history of hypertension, hyperlipidemia, NSTEMI, pancreatitis (hospitalized for several months starting in Oct, still recuperating physically), obesity, NARCISO who presents to the office with exertional chest pain that started in May.  His pain is dull and radiates to his left arm and is relieved with rest after 5 minutes.  He denies other symptoms.  He was seen by his cardiologist and underwent a left heart cath which shows multivessel disease.    His wife is present for the entire consultation.    HOSPITAL COURSE:   POD 1  On norepi, SR, neuro intact, prevena in place, abdomen soft, fluid balance positive, wt up,  3L NC. 266 mL out of chest tubes overnight. Plan:  Keep chest tubes and pacing wires. Albumin this AM, wean norepi as able, IS/ambulate.     POD 2  HDS, SR/ST, neuro intact, prevena in place, abdomen soft, fluid balance positive, wt up,  2L NC. 123 mL out of chest tubes last night.  Plan: Diurese. Remove chest tubes, pacing wires, and montalvo catheter. IS/ambulate. Transfer to [facility].       POD 3 HDS, SR, diuresing well continue, wean oxygen, amb/enc IS     POD 4 HDS. SR. Neuro intact.  Abdomen soft nontender weight-4kg up from admit.  Plan: DC prevena.  Increase diuresis.  Encourage IS/ambulation.   Home in AM.    POD 5 HDS. SR. Neuro intact.  +BM. RA.  BG better controlled.  No home medications for diabetes management.   Plan: Diabetes consult today, order necessary insulin and supplies.  DC home.    TELEMETRY:9/16 SR  9/17 SR/ST  9/18 SR  9/19 SR  9/20 SR    RECENT LABS:     Lab Results   Component Value Date/Time    SODIUM 136 09/20/2023 12:15 AM    POTASSIUM 3.7 09/20/2023 12:15 AM    CHLORIDE 103 09/20/2023 12:15 AM    CO2 23 09/20/2023 12:15 AM    GLUCOSE 167 (H) 09/20/2023 12:15 AM    BUN 14 09/20/2023 12:15 AM    CREATININE 0.93 09/20/2023 12:15 AM      Lab Results   Component Value Date/Time    WBC 5.9 09/20/2023 12:15 AM    RBC 2.72 (L) 09/20/2023 12:15 AM    HEMOGLOBIN 8.4 (L) 09/20/2023 12:15 AM    HEMATOCRIT 24.8 (L) 09/20/2023 12:15 AM    MCV 91.2 09/20/2023 12:15 AM    MCH 30.9 09/20/2023 12:15 AM    MCHC 33.9 09/20/2023 12:15 AM    MPV 10.6 09/20/2023 12:15 AM    NEUTSPOLYS 68.60 09/12/2023 09:50 AM    LYMPHOCYTES 20.60 (L) 09/12/2023 09:50 AM    MONOCYTES 8.50 09/12/2023 09:50 AM    EOSINOPHILS 1.50 09/12/2023 09:50 AM    BASOPHILS 0.40 09/12/2023 09:50 AM      Lab Results   Component Value Date/Time    PROTHROMBTM 16.4 (H) 09/15/2023 02:10 PM    INR 1.30 (H) 09/15/2023 02:10 PM        Fluids:    Intake/Output Summary (Last 24 hours) at 9/20/2023 1146  Last data filed at 9/20/2023 0800  Gross per 24 hour   Intake 720 ml   Output 2655 ml   Net -1935 ml     Admit weight: Weight: 96.5 kg (212 lb 11.9 oz)  Current weight: Weight: 97.6 kg (215 lb 2.7 oz) (09/20/23 3056)    ALLERGIES:     Patient has no known allergies.    EJECTION FRACTION:  65%    CARDIAC MEDICATIONS:    ASA - Yes    Plavix - Yes    Post-operative Beta Blockers - Yes    Ace/ARB- No; contraindicated because of Normal  EF    Statin - Yes    Aldactone- No; contraindicated because of Normal EF    SGLT2i-  No contraindicated because of No; contraindicated because of Normal EF    DISCHARGE MEDICATIONS:      Medication List        START taking these medications        Instructions   Alcohol Swabs   Doctor's comments: Per formulary preference. ICD-10 code: E11.65 Uncontrolled type 2 Diabetes Mellitus  Wipe site with prep pad prior to injection.     * Blood Glucose Meter Kit   Doctor's comments: Or per formulary preference. ICD-10 code: E11.65 Uncontrolled type 2 Diabetes Mellitus  Test blood sugar as recommended by provider. One Touch Verio Flex blood glucose monitoring kit.     * Blood Glucose Test Strips   Doctor's comments: Or per formulary preference. ICD-10 code: E11.65 Uncontrolled type 2 Diabetes Mellitus  Use one One Touch Verio Flex strip to test blood sugar three times daily before meals.     clopidogrel 75 MG Tabs  Start taking on: September 21, 2023  Commonly known as: Plavix   Take 1 Tablet by mouth every day.  Dose: 75 mg     furosemide 20 MG Tabs  Commonly known as: Lasix   Take 1 Tablet by mouth every day for 30 days.  Dose: 20 mg     Insulin Pen Needle 32 G x 4 mm   Doctor's comments: Per patient/formulary preference. ICD-10 code: E11.65 Uncontrolled type 2 Diabetes Mellitus  Use one pen needle in pen device to inject insulin three times daily.     Lancets   Doctor's comments: Or per formulary preference. ICD-10 code: E11.65 Uncontrolled type 2 Diabetes Mellitus  Use one One Touch Verio Flex lancet to test blood sugar three times daily before meals.     Lantus SoloStar 100 UNIT/ML Sopn injection  Generic drug: insulin glargine   Inject 15 Units under the skin every evening.  Dose: 15 Units     metFORMIN 500 MG Tabs  Commonly known as: Glucophage   Take 1 Tablet by mouth 2 times a day with meals.  Dose: 500 mg     potassium chloride SA 10 MEQ Tbcr  Commonly known as: K-Dur   Take 1 Tablet by mouth every day for 30  days.  Dose: 10 mEq     traMADol 50 MG Tabs  Commonly known as: Ultram   Take 1 Tablet by mouth every 6 hours as needed for Moderate Pain or Severe Pain for up to 14 days.  Dose: 50 mg           * This list has 2 medication(s) that are the same as other medications prescribed for you. Read the directions carefully, and ask your doctor or other care provider to review them with you.                CHANGE how you take these medications        Instructions   aspirin 81 MG EC tablet  Start taking on: September 21, 2023  What changed: how much to take   Take 1 Tablet by mouth every day.  Dose: 81 mg            CONTINUE taking these medications        Instructions   acetaminophen 500 MG Tabs  Commonly known as: Tylenol   Take 1,000 mg by mouth 1 time a day as needed for Mild Pain.  Dose: 1,000 mg     atorvastatin 40 MG Tabs  Commonly known as: Lipitor   Take 1 Tablet by mouth every evening.  Dose: 40 mg     cetirizine 10 MG Tabs  Commonly known as: ZyrTEC   Take 10 mg by mouth every day.  Dose: 10 mg     Creon [id number] units Cpep  Generic drug: Pancrelipase (Lip-Prot-Amyl)   Take 2 Tablets by mouth in the morning, at noon, and at bedtime.  Dose: 2 Tablet     metoprolol tartrate 25 MG Tabs  Commonly known as: Lopressor   Take 1 Tablet by mouth 2 times a day.  Dose: 25 mg     omeprazole 20 MG delayed-release capsule  Commonly known as: PriLOSEC   Take 20 mg by mouth every day.  Dose: 20 mg     tamsulosin 0.4 MG capsule  Commonly known as: Flomax   Take 0.4 mg by mouth 1/2 hour after breakfast.  Dose: 0.4 mg              NARCOTIC PAIN MEDICATIONS:   In prescribing controlled substances to this patient, I certify that I have obtained and reviewed their medical history. I have also made a good teressa effort to obtain applicable records from other providers who have treated the patient .    I have conducted a physical exam and documented it. I have reviewed the patient's prescription history as maintained by the [place]  Prescription Monitoring Program.     I have assessed the patient’s risk for abuse, dependency, and addiction using the validated Opioid Risk Tool.    Given the above, I believe the benefits of controlled substance therapy outweigh the risks. The reasons for prescribing controlled substances include non-narcotic, oral analgesic alternatives have been inadequate for pain control. Accordingly, I have discussed the risk and benefits, treatment plan, and alternative therapies with the patient.     Pt understands this prescription is a controlled substance which is potentially habit-forming and its use is regulated by the [place]. It must be submitted to the pharmacy within 5 days of the date written and can not be called in or faxed to the pharmacy. Refills are subject to terms of a medicine agreement. Any refill requires a new prescription that must be obtained from this office during regular office hours Monday through Thursday 7 am to 4 pm. We ask for 72 hours notice to get an appointment for a narcotic pain medication refill. This medicine can cause nausea, significant constipation, sedation, confusion.     DIET:   Cardiac diet    DISCHARGE INSTRUCTIONS DISCUSSED WITH THE PATIENT:      1. NO driving for 4 weeks after surgery. You may ride as a passenger.  2. NO lifting of any item over 10 lbs (e.g. gallon of milk) for 6 weeks after surgery.  3. DO walk as much as possible! Walk a minimum of once a day. Depending on your fatigue and comfort level, you may walk as much as you wish. There is no maximum.  4. Other physical activities (sex, housework, gardening, etc.) are OK after 4 weeks   5. Continue using incentive spirometer for 2 weeks, especially if going home on oxygen.    Incision Care:  1. SHOWER ONLY - no baths. Clean incision daily with plain Ivory ® soap or any other dye or perfume free soap. Then pat incision dry with clean towel. Avoid creams or lotions on the incision(s).  a. If there is any increase in redness  or swelling, or separation of the incision line, or thick drainage* from any of the incisions, call right away  * Clear, thin drainage is not abnormal especially from the leg incision and/or                         chest tube sites.  2. Continue to wear your ARMANI Stockings for 4 weeks. You may take off the stockings when in bed or when the legs are elevated.    Patient instructed to call [facility] cardiac surgery at [phone]  if any increased shortness of breath, uncontrolled pain, weight gain greater than 3 pounds in 1 day or 5 pounds in 1 week, SBP >140, HR <60 or redness swelling or drainage of incisions.      FOLLOW-UP:   Future Appointments   Date Time Provider Department Center   10/16/2023  1:45 PM CT RESOURCE PROVIDER [facility] None   10/17/2023 10:00 AM Jamshid Lockett M.D. [facility] None

## 2023-09-20 NOTE — PROGRESS NOTES
Discharge instructions given to patient at bedside, verbalizes understanding and states plans for follow-up with PCP. New and home medication review, post-discharge activity level and worsening of symptoms needing follow-up care discussed. Telemetry monitor/IV cathlon removed. All belongings accounted for, all questions answered at this time. Patient refused wheelchair and escort out.

## 2023-09-20 NOTE — PROGRESS NOTES
Received bedside report from RN, VSS, pt assessment complete. Pt AAOx4, with no report of pain at this time.Patient on room air. Tele monitor on. No signs of acute distress noted at this time. Plan of care discussed with pt and verbalizes no questions. Pt denies any additional needs at this time. Bed locked/in lowest position, pt educated on fall risk and verbalized understanding, call light within reach, hourly rounding initiated.

## 2023-09-20 NOTE — DISCHARGE PLANNING
"Discharge Discussion and home care recap prior to discharge:      Discharge review was completed with patient and his wife    Patient was reminded that outpatient cardiac rehab beginning 6 weeks post op. They were told it is highly recommended and available to them if desired, but not required. They were told to look at the provided flyer for the contact number and additional information.    Patient was reminded to utilize the vitals log book provided to take his/her weight daily and record.    Patient was told to call me with weight gain > 3 lbs in 1 day or 5 lbs in 1 week  Patient was also told to record heart rate and blood pressure morning and night; and to follow the hold parameters provided in the discharge summary on beta blockers and ACE/ARB (if prescribed on discharge).    Patient was reminded to review the \"recovery after heart surgery\" packet with information on normal expectations such as clicking in the chest, loud/pounding heart/ hot and cold flashes, weight loss, constipation, insomnia, tingling on left side of chest (CABG with LIMA).  I also reviewed the decision tree of whom to call and when with concerns after going home. RN navigator Monday through Friday during business hours for any questions or urgent concerns, and the office for urgent concerns at night or on the weekends.    I briefly recapped the discharge instructions that their primary RN will review in depth prior to discharge   1) appointments   2) medication reconciliation (start, continue, change, stop)   3) care instructions    All additional questions were answered or deferred to the bedside RN.    Event time 20 minutes    "

## 2023-09-21 ENCOUNTER — TELEPHONE (OUTPATIENT)
Dept: VASCULAR LAB | Facility: MEDICAL CENTER | Age: 68
End: 2023-09-21
Payer: MEDICARE

## 2023-09-21 NOTE — CONSULTS
Diabetes education: Pt has a hx of pancreatitis, but newly diagnosed with diabetes ( insulin insufficiency per consult note). Pt was admitted with blood sugar of 110 ( 363 on 9/12/23) and Hga1c of 11.5%. Pt had a CABG on 9/15/23.  Pt was on Glargine 15 units in AM with Lispro 7 units tid meals and Lispro per sliding scale coverage ac and hs. Blood sugars were 182 ( 2 unit), 180 ( 2 + 7 units), and 238 ( 3 +7 units).   Order received for education (prior to discharge) this am.  CDE met with pt and wife before discharge.  Discussed what diabetes was,  type two,( vs type one ) hypoglycemia, hyperglycemia,     and goals for blood sugars. Discussed need for carbohydrates and proteins, with every meal and   why. Discussed the importance of the HS snack with a carbohydrate and protein. Discussed need, benefit and precautions with exercise.  Discussed  what effects blood sugars. Discussed need to follow up with  PCP.  Insulin was discussed as well, insulin storage, shelf life and site rotation. Pt  practiced with saline pens, and practice device.  Pt had ( from pharmacy), a  One touch verio flex meter ( no case ) and supplies. Unable to see all supplies as wife had them packed away ( they were charted on by pharmacist). Return demo done . Pt was given a [facility] diabetes book and handout for insulin pens. Questions answered.

## 2023-09-21 NOTE — TELEPHONE ENCOUNTER
"[facility] Heart and Vascular Health and Pharmacotherapy Programs     Received diabetes referral from KAMI Willis on 9/20/23.    Per referral: \"New onset diabetes s/p cabg x 3.  Needs tight glucose control for sternal healing\"    Pt requests we let him c/b to schedule initial visit to establish care. Will c/b in 1 week if not scheduled.     Insurance: [employer]  PCP: External  Locations to be seen: [facility]armac[facility] at [phone], fax [phone]    Moises Johnson, PharmD, BCACP      "

## 2023-09-25 NOTE — DOCUMENTATION QUERY
[facility]                                                                       Query Response Note      PATIENT:               ANANDA KAUR  ACCT #:                  [id number]  MRN:                     [id number]  :                      1955  ADMIT DATE:       9/15/2023 5:01 AM  DISCH DATE:        2023 3:55 PM  RESPONDING  PROVIDER #:        [id number]           QUERY TEXT:    Based on the clinical indicators outlined above, please clarify if the following has been treated/evaluated during this hospitalization:    NOTE: If an appropriate response is not listed below, please respond with a new note.    Thank you.      The patient's Clinical Indicators include:   Critical care note: S/p CABG. Persistent hypotension requiring vasopressor drip.   Critical care note: Acute blood loss as a cause of postoperative anemia. Status post platelet transfusion Intra-op for oozing.  9/15 Vitals: SBP 80s, MAP 60s    Risk factors: S/p CABG, acute blood loss anemia    Treatment: Vasopressor drip, platelet transfusion, critical care consult    Thank you,  Esvin Georges  Clinical   Connect via [address]  Options provided:   -- Hemorrhagic shock   -- Cardiogenic shock   -- Other shock, Please specify   -- Other explanation, Please specify   -- Unable to determine      Query created by: Esvin Georges on 2023 10:28 AM    RESPONSE TEXT:    Cardiogenic shock          Electronically signed by:  JEREMY M GONDA MD 2023 2:00 PM

## 2023-09-26 ENCOUNTER — TELEPHONE (OUTPATIENT)
Dept: CARDIOTHORACIC SURGERY | Facility: MEDICAL CENTER | Age: 68
End: 2023-09-26
Payer: MEDICARE

## 2023-09-26 NOTE — TELEPHONE ENCOUNTER
"Hospital follow up call    he is showering everyday or every other day.    he states that all incisions are healing well and approximated with no s/s of infection (redness, puss, fever, purulent drainage, or tenderness). He states EVH site is slightly red but improved.    he states that the post op pain is well controlled.  Narcotics are rarely being utilized. Tylenol is being utilized.    he is using the IS; volume is improved. Current volume is 3000 ml.    he is walking everyday, distance is increased from the hospital.    he is obtaining daily weights. Current weight is 212 lbs which is more than the first home weight of 207.7 lbs. He is not wearing the compression/ARMANI hose. He denies LE edema.  He reports his appetite is \"better than it should be\".      he is taking all prescribed meds as directed.  he has no medication questions.    States BG are around or less than 150 with the Lantus and metformin.    he is taking vitals (HR and BP).    BP's: 1 teen's to low 120's / 60's to 70's  HR's: AM 70's PM not taking them    he has had a bowel movement since discharge.    he has no additional questions at this time. Follow up education was not needed on anything else. Follow up appointments were reviewed and I ensured they have my number if issues or concerns arise.      Follow up call time was 12 minutes.        "

## 2023-09-28 ENCOUNTER — TELEPHONE (OUTPATIENT)
Dept: VASCULAR LAB | Facility: MEDICAL CENTER | Age: 68
End: 2023-09-28
Payer: MEDICARE

## 2023-09-28 NOTE — TELEPHONE ENCOUNTER
"[facility] Heart and Vascular Health and Pharmacotherapy Programs     Received diabetes referral from KAMI Willis on 9/20/23.     Per referral: \"New onset diabetes s/p cabg x 3.  Needs tight glucose control for sternal healing\"     2nd call  Patient scheduled for 10/19/23 - soonest patient is able to schedule.      Insurance: [employer]  PCP: External  Locations to be seen: [facility]Pharmacotherapy [facility] at [phone], fax [phone]    Shaniqua MckeeD    "

## 2023-09-28 NOTE — PROGRESS NOTES
"CHIEF COMPLAINT: Post-op visit     PROCEDURE:   Dr Mann 9/15/23   Coronary artery bypass grafting x3  Left internal mammary artery to left anterior descending artery  Reversed saphenous vein graft to obtuse marginal artery  Reversed saphenous vein graft to distal right coronary artery  Right coronary artery endarterectomy  Endo-vein procurement    HPI: He has been doing well in his postoperative period. His pain is well controlled.  He has been seen by cardiology.  He has been walking daily for exercise.      /62 (BP Location: Left arm, Patient Position: Sitting, BP Cuff Size: Adult)   Pulse 74   Temp 37.2 °C (99 °F) (Temporal)   Ht 1.753 m (5' 9\")   Wt 101 kg (221 lb 9.6 oz)   SpO2 94%     PHYSICAL EXAM:  Cardiac: S1S2  Neuro:  AAO x 3  Resp:  CTA  Wounds:  CDI  Sternum:  Stable    PLAN: Discharge from clinic.  Continue plavix for 3 months or per your Cardiologist's recommendations.  We reviewed post operative sternal precautions, weight limits and driving precautions moving forward.  We reviewed SBE prophylaxis.      Overall, we are very pleased with the patient’s progress and we have instructed the patient to follow-up with us in the future should they have any concerns related to their surgery. Otherwise, we will see the patient on a PRN basis. The patient will continue to follow-up with their Cardiologist and PCP.  The patient has been informed that any further prescription refills should be done through their primary care physician and/or cardiologist.  They acknowledged understanding.  Thank you for allowing us to participate in the care of this very pleasant patient and please let us know if there is any way we may be of further assistance.   "

## 2023-10-03 NOTE — PROGRESS NOTES
[facility] Follow Up Note     Date: 4/13/2020 / Time: 10:28 AM    Patient ID:   Name:             Yadiel Moody   YOB: 1955  Age:                 64 y.o.  male   MRN:               [id number]      Thank you for requesting a sleep medicine consultation on Yadiel Moody at the sleep center. He presents today with the chief complaints of NARCISO follow up.     HISTORY OF PRESENT ILLNESS:       Pt is currently on BiPAP 8/4 cm. He goes to sleep around 11 pm-12 and wakes up around 7-8 am. He is getting about 8 hrs of sleep on a good night and about 6 hr of sleep on a bad night.  Overall, he does finds his sleep refreshing.      He is using BiPAP most days of the week. Pt reports 6 hrs of average nightly use of BiPAP. Pt denies snoring, gasping,choking.Pt also denies significant mask leak that is interfering with sleep. The 30 day compliance was downloaded which shows  adequate compliance with more that 4 hr usage about 86%. The AHI is has improved to 3.5/hr. The mask leak is normal. The symptoms of NARCISO are well controlled. He  denies any symptoms of narcolepsy such as sleep paralysis or cataplexy, or any symptoms to suggest parasomnias such as sleep walking or acting out of dreams.     Since his last visit he had BiPAP titration on 11/26/19. BiPAP was started at 10/6 cm of water and titrated to 16/12 cm of water.  On BIPAP: 14/10 cm of water the resultant AHI was 5 in NREM sleep, however mean oximetry was 89%. On BiPAP: 16/12 cm of water mean oximetry improved to 91% SPO2 in REM sleep    SLEEP HISTORY   HST on 2/20/19 by an outlying facility which showed severe NARCISO with AHI of 33.5/hr and O2 caryn 62%      REVIEW OF SYSTEMS:       Constitutional: Denies fevers, Denies weight changes  Eyes: Denies changes in vision, no eye pain  Ears/Nose/Throat/Mouth: Denies nasal congestion or sore throat   Cardiovascular: Denies chest pain or palpitations   Respiratory: Denies shortness of breath ,  Denies cough  Gastrointestinal/Hepatic: Denies abdominal pain, nausea, vomiting, diarrhea, constipation or GI bleeding   Genitourinary: Deniesdysuria or frequency  Musculoskeletal/Rheum: Denies  joint pain and swelling   Skin/Breast: Denies rash,   Neurological: Denies headache, confusion, memory loss or focal weakness/parasthesias  Psychiatric: denies mood disorder   Sleep: denies snoring     Comprehensive review of systems form is reviewed with the patient and is attached in the EMR.     PMH:  has a past medical history of Allergic rhinitis, Anesthesia, Back pain, Chickenpox, Chinese measles, High cholesterol, Hyperlipidemia, Hypertension, Influenza, Mumps, Nasal drainage, Obesity, Pulmonary hypertension (HCC), Sleep apnea (2015), and Tonsillitis.  MEDS:   Current Outpatient Medications:   •  fluticasone (FLONASE) 50 MCG/ACT nasal spray, , Disp: , Rfl:   •  niacin 500 MG Tab, Take 1,000 mg by mouth every day., Disp: , Rfl:   •  cetirizine (ZYRTEC) 10 MG Tab, Take 10 mg by mouth every day., Disp: , Rfl:   •  therapeutic multivitamin-minerals (THERAGRAN-M) Tab, Take 1 Tab by mouth every day., Disp: , Rfl:   •  aspirin (ASA) 325 MG Tab, Take 81 mg by mouth every 6 hours as needed., Disp: , Rfl:   •  ascorbic acid (ASCORBIC ACID) 500 MG Tab, Take 500 mg by mouth every day., Disp: , Rfl:   •  lamoTRIgine (LAMICTAL) 25 MG Tab, Take 1 Tab by mouth every day., Disp: 90 Tab, Rfl: 3  •  ezetimibe (ZETIA) 10 MG Tab, TK 1 T PO QD, Disp: , Rfl: 5  •  memantine (NAMENDA) 10 MG Tab, Take 1 Tab by mouth 2 times a day., Disp: 180 Tab, Rfl: 2  •  omeprazole (PRILOSEC) 20 MG delayed-release capsule, Take 20 mg by mouth every day., Disp: , Rfl:   •  vitamin D (CHOLECALCIFEROL) 1000 UNIT Tab, Take 4,000 Units by mouth every day., Disp: , Rfl:   •  lisinopril (PRINIVIL) 20 MG Tab, Take 40 mg by mouth every day., Disp: , Rfl:   ALLERGIES:   Allergies   Allergen Reactions   • Crestor [Rosuvastatin Calcium]      Severe side effects   •  "Hydrocodone      Gives patient insomnia, makes him \"crazy\"     SURGHX:   Past Surgical History:   Procedure Laterality Date   • UMBILICAL HERNIA REPAIR  2/23/2015    Performed by John H Ganser, M.D. at SURGERY [facility] [place]   • OTHER ORTHOPEDIC SURGERY  2009    Right Knee Repair   • OTHER ORTHOPEDIC SURGERY  1988    Right Meniscus   • [id number]     • NASAL RECONSTRUCTION     • NEPHRECTOMY LAPAROSCOPIC     • TONSILLECTOMY       SOCHX:  reports that he has never smoked. He has never used smokeless tobacco. He reports current alcohol use. He reports that he does not use drugs..  FH:   Family History   Problem Relation Age of Onset   • Heart Attack Father    • Heart Disease Father    • Sleep Apnea Father          Physical Exam:  Vitals/ General Appearance:   Weight/BMI: Body mass index is 38.92 kg/m².  /82 (BP Location: Left arm, Patient Position: Sitting, BP Cuff Size: Adult)   Pulse 61   Resp 14   Ht 1.727 m (5' 8\")   Wt 116.1 kg (256 lb)   SpO2 95%   Vitals:    04/13/20 1000   BP: 124/82   BP Location: Left arm   Patient Position: Sitting   BP Cuff Size: Adult   Pulse: 61   Resp: 14   SpO2: 95%   Weight: 116.1 kg (256 lb)   Height: 1.727 m (5' 8\")       Pt. is alert and oriented to time, place and person. Cooperative and in no apparent distress.       -Head: Atraumatic, normocephalic.   -. Throat: Oropharynx appears crowded in that the palate is overhanging   -. Neck: Supple. No thyromegaly  -. Chest: Trachea central,  -. Lungs auscultation: B/L good air entry, vesicular breath sounds, no adventitious sounds  -. Heart auscultation: 1st and 2nd heart sounds normal, regular rhythm. No appreciable murmur.  - Extremities: no clubbing, no pedal edema.  - Skin: No rash  - NEUROLOGICAL EXAMINATION: On neurological exam, the patient was alert and oriented x3. speech was clear and fluent without dysarthria.      ASSESSMENT AND PLAN     1. Sleep Apnea .    The pathophysiology of sleep anea and the increased risk of " cardiovascular morbidity from untreated sleep apnea is discussed in detail with the patient.      He is urged to avoid supine sleep, weight gain and alcoholic beverages since all of these can worsen sleep apnea. He is cautioned against drowsy driving. If He feels sleepy while driving, He must pull over for a break/nap, rather than persist on the road, in the interest of He own safety and that of others on the road.   Plan   - Continue BiPAP 8/4 cm with comfort gel FFM   -  Ordering replacement BiPAP due to current machine being over 5 years old and  continuing to alarm during the night, interrupting patients sleep. New BiPAP 8/4 cm is  ordered today    - F/u in 60-90 days to assess the efficiacy of recommended pressure    - compliance download was reviewed and discussed with the pt   - compliance was reinforced     2. Regarding treatment of other past medical problems and general health maintenance,  He is urged to follow up with PCP.       Epidermal Closure: simple interrupted

## 2023-10-13 DIAGNOSIS — E11.9 TYPE 2 DIABETES MELLITUS WITHOUT COMPLICATION, WITHOUT LONG-TERM CURRENT USE OF INSULIN (HCC): ICD-10-CM

## 2023-10-13 DIAGNOSIS — Z95.1 S/P CABG X 3: ICD-10-CM

## 2023-10-13 RX ORDER — CLOPIDOGREL BISULFATE 75 MG/1
75 TABLET ORAL DAILY
Qty: 90 TABLET | OUTPATIENT
Start: 2023-10-13

## 2023-10-16 ENCOUNTER — OFFICE VISIT (OUTPATIENT)
Dept: CARDIOTHORACIC SURGERY | Facility: MEDICAL CENTER | Age: 68
End: 2023-10-16
Payer: MEDICARE

## 2023-10-16 VITALS
WEIGHT: 221.6 LBS | OXYGEN SATURATION: 94 % | HEIGHT: 69 IN | DIASTOLIC BLOOD PRESSURE: 62 MMHG | SYSTOLIC BLOOD PRESSURE: 110 MMHG | TEMPERATURE: 99 F | BODY MASS INDEX: 32.82 KG/M2 | HEART RATE: 74 BPM

## 2023-10-16 DIAGNOSIS — Z09 POSTOP CHECK: ICD-10-CM

## 2023-10-16 PROCEDURE — 3078F DIAST BP <80 MM HG: CPT | Performed by: NURSE PRACTITIONER

## 2023-10-16 PROCEDURE — 99024 POSTOP FOLLOW-UP VISIT: CPT | Performed by: NURSE PRACTITIONER

## 2023-10-16 PROCEDURE — 3074F SYST BP LT 130 MM HG: CPT | Performed by: NURSE PRACTITIONER

## 2023-10-16 ASSESSMENT — FIBROSIS 4 INDEX: FIB4 SCORE: 1

## 2023-10-17 ENCOUNTER — OFFICE VISIT (OUTPATIENT)
Dept: CARDIOLOGY | Facility: MEDICAL CENTER | Age: 68
End: 2023-10-17
Attending: INTERNAL MEDICINE
Payer: MEDICARE

## 2023-10-17 VITALS
WEIGHT: 222 LBS | SYSTOLIC BLOOD PRESSURE: 110 MMHG | BODY MASS INDEX: 32.88 KG/M2 | HEIGHT: 69 IN | HEART RATE: 71 BPM | RESPIRATION RATE: 16 BRPM | DIASTOLIC BLOOD PRESSURE: 70 MMHG | OXYGEN SATURATION: 97 %

## 2023-10-17 DIAGNOSIS — I65.23 BILATERAL CAROTID ARTERY STENOSIS: ICD-10-CM

## 2023-10-17 DIAGNOSIS — E78.00 PURE HYPERCHOLESTEROLEMIA: ICD-10-CM

## 2023-10-17 DIAGNOSIS — Z95.1 HX OF CABG: ICD-10-CM

## 2023-10-17 DIAGNOSIS — I10 PRIMARY HYPERTENSION: ICD-10-CM

## 2023-10-17 DIAGNOSIS — I25.118 CORONARY ARTERY DISEASE OF NATIVE ARTERY OF NATIVE HEART WITH STABLE ANGINA PECTORIS (HCC): ICD-10-CM

## 2023-10-17 PROBLEM — I25.5 ISCHEMIC CARDIOMYOPATHY: Status: RESOLVED | Noted: 2023-08-17 | Resolved: 2023-10-17

## 2023-10-17 PROCEDURE — 3078F DIAST BP <80 MM HG: CPT | Performed by: INTERNAL MEDICINE

## 2023-10-17 PROCEDURE — 3074F SYST BP LT 130 MM HG: CPT | Performed by: INTERNAL MEDICINE

## 2023-10-17 PROCEDURE — 99213 OFFICE O/P EST LOW 20 MIN: CPT | Performed by: INTERNAL MEDICINE

## 2023-10-17 PROCEDURE — 99215 OFFICE O/P EST HI 40 MIN: CPT | Performed by: INTERNAL MEDICINE

## 2023-10-17 ASSESSMENT — ENCOUNTER SYMPTOMS
MUSCULOSKELETAL NEGATIVE: 1
BLURRED VISION: 0
COUGH: 0
NERVOUS/ANXIOUS: 0
NAUSEA: 0
GASTROINTESTINAL NEGATIVE: 1
DIZZINESS: 0
DEPRESSION: 0
RESPIRATORY NEGATIVE: 1
ABDOMINAL PAIN: 0
PALPITATIONS: 0
WEAKNESS: 0
SHORTNESS OF BREATH: 0
CARDIOVASCULAR NEGATIVE: 1
PSYCHIATRIC NEGATIVE: 1
BRUISES/BLEEDS EASILY: 0
VOMITING: 0
CONSTITUTIONAL NEGATIVE: 1
CLAUDICATION: 0
FEVER: 0
EYES NEGATIVE: 1
FOCAL WEAKNESS: 0
WEIGHT LOSS: 0
MYALGIAS: 0
HEADACHES: 0
NEUROLOGICAL NEGATIVE: 1
DOUBLE VISION: 0
CHILLS: 0

## 2023-10-17 ASSESSMENT — FIBROSIS 4 INDEX: FIB4 SCORE: 1

## 2023-10-17 NOTE — PROGRESS NOTES
Chief Complaint   Patient presents with    Chest Pain    Hypertension    MI (Non ST Segment Elevation MI)       Subjective     Yadiel Moody is a 67 y.o. male who presents today for hospital follow up.    Since the discharge from [facility] on 09/20/23 status post coronary artery bypass graft surgery, he has been doing well. He denies chest pain, shortness of breath, palpitations, nausea/vomiting or diaphoresis. He keeps active walking 2 miles per day.    Past Medical History:   Diagnosis Date    Allergic rhinitis     Anesthesia     slow to wake up; ?apneic, needs encourangement to breathe    Back pain     Bowel habit changes 09/07/2023    diarrhea at times r/t pancreatitis    Cataract     minimal; NOT surgically corrected    Chickenpox     history of    Slovak measles     history of    Heart burn     High cholesterol     medicated    Hx of lithotripsy     Hyperlipidemia     Hypertension     medicated    Influenza     history of    Mumps     history of    Myocardial infarct (HCC) 10/2022    x 3    Nasal drainage     Obesity     Pancreatitis 10/09/2022    Psychiatric problem 09/07/2023    PTSD- not medicated    Pulmonary hypertension (HCC)     Sleep apnea 2015    Uses Bi-pap    Snoring     sleep study done    Tonsillitis     history of    Urinary urgency 09/07/2023     Past Surgical History:   Procedure Laterality Date    MULTIPLE CORONARY ARTERY BYPASS ENDO VEIN HARVEST  9/15/2023    Procedure: CORONARY ARTERY BYPASS GRAFTING X3, ENDOSCOPIC VEIN HARVEST, TRANSESOPHAGEAL ECHOCARDIOGRAM;  Surgeon: Lizet Mann M.D.;  Location: [facility];  Service: Cardiac    ECHOCARDIOGRAM, TRANSESOPHAGEAL, INTRAOPERATIVE  9/15/2023    Procedure: ECHOCARDIOGRAM, TRANSESOPHAGEAL, INTRAOPERATIVE;  Surgeon: Lizet Mann M.D.;  Location: [facility];  Service: Cardiac    UMBILICAL HERNIA REPAIR  02/23/2015    Performed by John H Ganser, M.D. at [facility] ORS    OTHER ORTHOPEDIC SURGERY  Right 2009    Right Knee Repair    OTHER ORTHOPEDIC SURGERY Right 1988    Right Meniscus    [id number]      NASAL RECONSTRUCTION      deviatum septum, sinus surgery around 1998,2013    TONSILLECTOMY      US GASTROINTESTIONAL ENDOSCOPIC      multiple for pancreatitis     Family History   Problem Relation Age of Onset    Heart Attack Father     Heart Disease Father     Sleep Apnea Father      Social History     Socioeconomic History    Marital status:      Spouse name: Not on file    Number of children: Not on file    Years of education: Not on file    Highest education level: Not on file   Occupational History    Not on file   Tobacco Use    Smoking status: Never     Passive exposure: Never    Smokeless tobacco: Never   Vaping Use    Vaping Use: Never used   Substance and Sexual Activity    Alcohol use: Not Currently    Drug use: No    Sexual activity: Not on file   Other Topics Concern    Not on file   Social History Narrative    Not on file     Social Determinants of Health     Financial Resource Strain: Not on file   Food Insecurity: Not on file   Transportation Needs: Not on file   Physical Activity: Not on file   Stress: Not on file   Social Connections: Not on file   Intimate Partner Violence: Not on file   Housing Stability: Not on file     No Known Allergies    (Medications reviewed.)  Outpatient Encounter Medications as of 10/17/2023   Medication Sig Dispense Refill    clopidogrel (PLAVIX) 75 MG Tab Take 1 Tablet by mouth every day. 30 Tablet 2    furosemide (LASIX) 20 MG Tab Take 1 Tablet by mouth every day for 30 days. 30 Tablet 0    potassium chloride SA (K-DUR) 10 MEQ Tab CR Take 1 Tablet by mouth every day for 30 days. 30 Tablet 0    insulin glargine (LANTUS SOLOSTAR) 100 UNIT/ML Solution Pen-injector injection Inject 15 Units under the skin every evening. 3 mL 5    Blood Glucose Monitoring Suppl (BLOOD GLUCOSE MONITOR SYSTEM) w/Device Kit Test blood sugar as recommended by provider. 1 Kit 0     glucose blood strip Use one strip to test blood sugar three times daily before meals. 100 Strip 0    Lancets Use one lancet to test blood sugar three times daily before meals. 100 Each 0    Alcohol Swabs Wipe site with prep pad prior to injection. 100 Each 0    Insulin Pen Needle 32 G x 4 mm Use one pen needle in pen device to inject insulin three times daily. 100 Each 0    metFORMIN (GLUCOPHAGE) 500 MG Tab Take 1 Tablet by mouth 2 times a day with meals. 60 Tablet 2    cetirizine (ZYRTEC) 10 MG Tab Take 10 mg by mouth every day.      acetaminophen (TYLENOL) 500 MG Tab Take 1,000 mg by mouth 1 time a day as needed for Mild Pain.      tamsulosin (FLOMAX) 0.4 MG capsule Take 0.4 mg by mouth 1/2 hour after breakfast.      Pancrelipase, Lip-Prot-Amyl, (CREON) 60787-882923 units Cap DR Particles Take 2 Tablets by mouth in the morning, at noon, and at bedtime.      metoprolol tartrate (LOPRESSOR) 25 MG Tab Take 1 Tablet by mouth 2 times a day. 180 Tablet 3    atorvastatin (LIPITOR) 40 MG Tab Take 1 Tablet by mouth every evening. 90 Tablet 3    omeprazole (PRILOSEC) 20 MG delayed-release capsule Take 20 mg by mouth every day.      aspirin 81 MG EC tablet Take 1 Tablet by mouth every day. (Patient not taking: Reported on 10/17/2023) 30 Tablet 2     No facility-administered encounter medications on file as of 10/17/2023.     Review of Systems   Constitutional: Negative.  Negative for chills, fever, malaise/fatigue and weight loss.   HENT: Negative.  Negative for hearing loss.    Eyes: Negative.  Negative for blurred vision and double vision.   Respiratory: Negative.  Negative for cough and shortness of breath.    Cardiovascular: Negative.  Negative for chest pain, palpitations, claudication and leg swelling.   Gastrointestinal: Negative.  Negative for abdominal pain, nausea and vomiting.   Genitourinary: Negative.  Negative for dysuria and urgency.   Musculoskeletal: Negative.  Negative for joint pain and myalgias.   Skin:  "Negative.  Negative for itching and rash.   Neurological: Negative.  Negative for dizziness, focal weakness, weakness and headaches.   Endo/Heme/Allergies: Negative.  Does not bruise/bleed easily.   Psychiatric/Behavioral: Negative.  Negative for depression. The patient is not nervous/anxious.               Objective     /70 (BP Location: Left arm, Patient Position: Sitting, BP Cuff Size: Adult)   Pulse 71   Resp 16   Ht 1.753 m (5' 9\")   Wt 101 kg (222 lb)   SpO2 97%   BMI 32.78 kg/m²     Physical Exam  Constitutional:       Appearance: Normal appearance. He is well-developed and normal weight.   HENT:      Head: Normocephalic and atraumatic.   Neck:      Vascular: No JVD.   Cardiovascular:      Rate and Rhythm: Normal rate and regular rhythm.      Heart sounds: Normal heart sounds.   Pulmonary:      Effort: Pulmonary effort is normal.      Breath sounds: Normal breath sounds.   Abdominal:      General: Bowel sounds are normal.      Palpations: Abdomen is soft.      Comments: No hepatosplenomegaly.   Musculoskeletal:         General: Normal range of motion.   Lymphadenopathy:      Cervical: No cervical adenopathy.   Skin:     General: Skin is warm and dry.   Neurological:      Mental Status: He is alert and oriented to person, place, and time.            CARDIAC STUDIES/PROCEDURES:    CARDIAC CATHETERIZATION CONCLUSIONS by Vazquez Romero  (08/22/23)  1.  Severe multivessel CAD  2.  Normal resting LVEDP 9 mmHg with mild transaortic gradient on pullback < 20mmHg    CARDIAC CATHETERIZATION CONCLUSIONS [facility] [place] (11/11/22)  Cardiac catheterization showing severe 3 vessel coronary artery disease.    CAROTID ULTRASOUND (09/01/23)  Bilateral.   Mild plaque of the carotid bifurcation. Doppler velocities are normal   throughout the carotid arteries without evidence of hemodynamically   significant stenosis.  (study result reviewed)     CT OF ABDOMEN [facility](03/08/23)  Vasculature: Non-aneurysmal abdominal aorta.     ECHOCARDIOGRAM CONCLUSIONS (08/17/23)  Prior study on 1/19/23, compared to the report of the prior study,   there has been no significant change.   Normal left ventricular systolic function.  The left ventricular ejection fraction is visually estimated to be 65%.  No significant valvular abnormalities.   (study result reviewed)      ECHOCARDIOGRAM CONCLUSIONS [facility] [place] (11/10/22)  Echocardiogram showing ejection fraction of 35-40%..    ECHOCARDIOGRAM CONCLUSIONS (01/19/13)  Diastolic dysfunction. Otherwise, structurally normal heart by surface echocardiogram.   Ejection fraction of 60-65%.     EKG performed on (09/17/23) was reviewed: EKG personally interpreted shows sinus rhythm.   EKG performed on (05/22/23) EKG shows sinus rhythm.   EKG performed on (12/28/12) EKG shows normal sinus rhythm with early R/S transition and Q waves of lead III.     Laboratory results of (10/28/20) Cholesterol profile of 246/347/38/139 mg/dL noted.   Laboratory results of (01/18/13) Cholesterol profile of 202/276/41/106 noted.     MPI CONCLUSIONS (01/19/13)  1. No fixed or reversible left ventricular perfusion defects.  2. Normal LV wall motion. Ejection fraction is 62%.    Assessment & Plan     1. Coronary artery disease of native artery of native heart with stable angina pectoris (HCC)        2. Hx of CABG        3. Primary hypertension        4. Pure hypercholesterolemia        5. Bilateral carotid artery stenosis            Medical Decision Making: Today's Assessment/Status/Plan:        Coronary artery disease with prior coronary bypass graft (x3 left internal mammary artery to left anterior descending artery saphenous vein graft to obtuse marginal artery, saphenous vein graft to distal right coronary artery by Lizet Robbins 09/15/23): He is clinically doing well. I will continue with current medical care including aspirin,  clopidogrel, metoprolol, atorvastatin. We will discontinue furosemide and potassium. We will repeat an echocardiogram.   Hypertension: Blood pressure is well controlled. We will continue with beta blockade therapy.  Hyperlipidemia: He is doing well on statin therapy without myalgia symptoms. We will repeat labs including fasting lipid profile.   Carotid artery stenosis: Clinically stable on above medical therapy.   Health maintenance: He was diagnosed with necrotizing pancreatitis while visiting his grandchildren in [place], transferring to [place] and ultimately at [facility] in [place] in 10/09/22-03/19/23. ([facility]), NSTEMI (10/09/22 with hemorrhagic necrotizing pancreatitis with E. Coli sepsis),  Additional information: He and his wife lives in [place], [place].  Greater than 45 minutes of time was spent to review all above information; of which more than 50% of the time was face to face reviewing thee patient's medical issues, medication evaluation, study result review, lab results review, hospital and surgical results.    We will follow up in 3 months.    CC Trent Wilson

## 2023-10-19 ENCOUNTER — NON-PROVIDER VISIT (OUTPATIENT)
Dept: VASCULAR LAB | Facility: MEDICAL CENTER | Age: 68
End: 2023-10-19
Attending: INTERNAL MEDICINE
Payer: MEDICARE

## 2023-10-19 ENCOUNTER — TELEPHONE (OUTPATIENT)
Dept: VASCULAR LAB | Facility: MEDICAL CENTER | Age: 68
End: 2023-10-19
Payer: MEDICARE

## 2023-10-19 DIAGNOSIS — E11.65 TYPE 2 DIABETES MELLITUS WITH HYPERGLYCEMIA, WITH LONG-TERM CURRENT USE OF INSULIN (HCC): ICD-10-CM

## 2023-10-19 DIAGNOSIS — Z79.4 TYPE 2 DIABETES MELLITUS WITH HYPERGLYCEMIA, WITH LONG-TERM CURRENT USE OF INSULIN (HCC): ICD-10-CM

## 2023-10-19 PROCEDURE — 99213 OFFICE O/P EST LOW 20 MIN: CPT

## 2023-10-19 RX ORDER — DULAGLUTIDE 0.75 MG/.5ML
0.5 INJECTION, SOLUTION SUBCUTANEOUS
Qty: 2 ML | Refills: 5 | Status: SHIPPED | OUTPATIENT
Start: 2023-10-19 | End: 2024-01-04 | Stop reason: SINTOL

## 2023-10-19 NOTE — TELEPHONE ENCOUNTER
Received PA request via [employer] for Trulicity 0.75MG/0.5ML pen-injectors.    Quantity:2mls, Day Supply:28-$20    Quantity:6mls, Day Supply:84-$60    Ran Test claim via [employer]     Insurance: [facility]    Pharmacy on File  [id number]   [address] [address]   Phone:  [phone]    Fax:  [phone]     Is patient eligible to fill with [facility]? No, test claim rejected stating Patient lives in [place].

## 2023-10-19 NOTE — Clinical Note
Hi Dr Willingham! This is a new DM2 pt seen for an initial visit with pharmacotherapy today. He has history of necrotizing pancreatitis, which may have contributed to his newly diagnosed DM2. He was started on metformin and Lantus during his hospitalization for s/p CABG.   I wanted to start GLP1RA therapy especially since he's s/p CABG. I didn't see a contraindication between hx of pancreatitis and starting GLP1RA, just a precaution and close monitor. Let me know your thoughts. Thank you! Abigail Rasmussen, PharmD

## 2023-10-19 NOTE — PATIENT INSTRUCTIONS
Goal a1c is less than 7.0%  Goal fasting sugars   Goal sugar 2 hours after meal: less than 180    Increase metformin to 1000mg twice daily  Start Trulicity 0.75mg every 7 days  Continue Lantus 15 units every evening    Call our clinic if you have any concerns [phone]

## 2023-10-19 NOTE — PROGRESS NOTES
Patient Consult Note    Primary care physician: Trent Doll M.D.    Reason for consult: Management of Uncontrolled Type 2 Diabetes    HPI:  Yadiel Moody is a 67 y.o. old patient who comes in today for evaluation of above stated problem.    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 Pt was informed that his gallstone may have perforated his pancreas during a fall, causing the pancreatitis. He was recently diagnosed with T2DM during his recent hospital visit s/p CABG.    Allergies  Patient has no known allergies.    Current Diabetes Medication Regimen  Metformin IR: 500mg bid    Basal Insulin: Lantus 15 units under the skin every evening    Previous Diabetes Medications and Reason for Discontinuation  none    Potential Barriers to Care:  Adherence: denies missed doses(forgot today's dose)  Side effects: none  Affordability: Yes  Others: n/a    SMBG  Pt has home glucometer and proper testing technique - Yes  Discussed BG Goals: FBG 80 - 130, 2hPP < 180, A1c < 7%    Pt reports blood sugars:   Before Breakfast: 101- 140, highest 153  Before Bedtime: 129-166, highest 219    Hypoglycemia  Hypoglycemia awareness: Yes  Nocturnal hypoglycemia: None  Hypoglycemia:  None  Pt's treatment of Hypoglycemia  Discussed 15:15 Rule    Lifestyle  Current Exercise - Walk a couple miles 4-5 x a week   Exercise Goal - 150 mins/week 3-4 times    Dietary -   Breakfast - Eggs, toast  Lunch - Peanut butter, lunch meats, open faced sandwitch, tomatoes  Dinner - Keto pasta, apple, lamb, broccoli,   Snacks - cheese and ham,   Drinks - unable to discuss    Preventative Management  BP regimen (ACEi/ARB): unable to discuss  BP <140/90: unable to assess  Statin:  unable to discuss  LDL <100: No  Lab Results   Component Value Date/Time    CHOLSTRLTOT 246 (H) 10/28/2020 11:12 AM     (H) 10/28/2020 11:12 AM    HDL 38 (A) 10/28/2020 11:12 AM    TRIGLYCERIDE 347 (H) 10/28/2020 11:12 AM       Last Microalbumin/Cr:  No  "results found for: \"MALBCRT\", \"MICROALBUR\"  Last A1c:  Lab Results   Component Value Date/Time    HBA1C 11.5 (H) 09/12/2023 09:50 AM      Last Retinal Scan: unable to discuss    Monofilament exam: unable to assess    Updated caregaps    Labs  Lab Results   Component Value Date/Time    SODIUM 136 09/20/2023 12:15 AM    POTASSIUM 3.7 09/20/2023 12:15 AM    CHLORIDE 103 09/20/2023 12:15 AM    CO2 23 09/20/2023 12:15 AM    GLUCOSE 167 (H) 09/20/2023 12:15 AM    BUN 14 09/20/2023 12:15 AM    CREATININE 0.93 09/20/2023 12:15 AM     Lab Results   Component Value Date/Time    ALKPHOSPHAT 205 (H) 09/12/2023 09:50 AM    ASTSGOT 13 09/12/2023 09:50 AM    ALTSGPT 21 09/12/2023 09:50 AM    TBILIRUBIN 0.6 09/12/2023 09:50 AM    INR 1.30 (H) 09/15/2023 02:10 PM    ALBUMIN 4.5 09/12/2023 09:50 AM        Physical Examination:  Vital signs: There were no vitals taken for this visit. There is no height or weight on file to calculate BMI.    Assessment and Plan:    1. DM2  Basic physiology of DMII was explained to patient as well as microvascular/macrovascular complications. The importance of increasing physical activity to improve diabetes control was discussed with the patient. Patient was also educated on changing diet and making better choices to help control blood sugar.   See HPI. His recent T2DM diagnosis is may be d/t necrotizing pancreatitis causing insulin insuffiencey.  Starting GLP1RA and/or SGLT2i is not a contraindication if pt has hx of pancreatitis. We discussed that starting GLP1RA will be most beneficial for glycemic control and cardiovascular protection, especially given pt is s/p CABG.  Discussed Goals: FBG 80 - 130, 2hPP < 180, a1c < 7.0%   Last a1c drawn on 9/12/23 was 11.5%, which is not at goal  Majority of the visit was composed of education - was unable to discuss caregaps during this visit.    - Medication changes:  Increase metformin to 1000mg bid  Start Trulicity 0.75mg q 7 days   - Continue:  Lantus " 15units qhs for now     - Lifestyle changes:  Continue to limit carb intake    Follow Up:  1 months    Abigail Rasmussen, PharmD    CC:   Dr Jaxon Willingham

## 2023-11-07 LAB — HBA1C MFR BLD: 6.9 % (ref ?–5.8)

## 2023-11-30 ENCOUNTER — NON-PROVIDER VISIT (OUTPATIENT)
Dept: VASCULAR LAB | Facility: MEDICAL CENTER | Age: 68
End: 2023-11-30
Attending: INTERNAL MEDICINE
Payer: MEDICARE

## 2023-11-30 PROCEDURE — 99212 OFFICE O/P EST SF 10 MIN: CPT

## 2023-11-30 NOTE — PROGRESS NOTES
Patient Consult Note    Primary care physician: Trent Doll M.D.    Reason for consult: Management of Uncontrolled Type 2 Diabetes    HPI:  Yadiel Moody is a 67 y.o. old patient who comes in today for evaluation of above stated problem.    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 Pt was informed that his gallstone may have perforated his pancreas during a fall, causing the pancreatitis. He was recently diagnosed with T2DM during his recent hospital visit s/p CABG.    Allergies  Patient has no known allergies.    Current Diabetes Medication Regimen  Metformin IR: 1000mg bid  GLP1RA: Trulicity 0.75mg q 7 days  Basal Insulin: Lantus 15 units under the skin every evening    Previous Diabetes Medications and Reason for Discontinuation  none    Potential Barriers to Care:  Adherence: denies missed doses(forgot today's dose)  Side effects: Dull ache in pancreas with Trulicity, but tolerable at this time.    Affordability: Yes    SMBG  Pt has home glucometer and proper testing technique - Yes  Discussed BG Goals: FBG 80 - 130, 2hPP < 180, A1c < 7%    Pt reports blood sugars:   Before Breakfast: 80-90, highest 103  Before Bedtime: 116-123, highest 180    Hypoglycemia  Hypoglycemia awareness: Yes  Nocturnal hypoglycemia: None  Hypoglycemia:  None  Pt's treatment of Hypoglycemia  Discussed 15:15 Rule    Lifestyle  Current Exercise - Walk a couple miles 4-5 x a week, but has been limited due to cold weather    Exercise Goal - 150 mins/week 3-4 times    Dietary: Common Adult  Breakfast - Eggs with ham and cheese, toast, oatmeal, cheerios   Lunch - Peanut butter, lunch meats, open faced sandwich with high grains/low carb bread, fruit, tomatoes  Dinner - Keto pasta, apple, lamb, broccoli, small meat of some kind with vegetable, limits carbs    Snacks - Cheese and ham or turkey, small bowl of cereal    Drinks - Black decaf coffee, about a quart of water a day sometimes less, unsweetened iced  "tea    Preventative Management  BP regimen (ACEi/ARB): None  BP <140/90:120/82  Statin: Atorvastatin 40 mg QD  LDL <100: No, pending labs from [facility]   Lab Results   Component Value Date/Time    CHOLSTRLTOT 246 (H) 10/28/2020 11:12 AM     (H) 10/28/2020 11:12 AM    HDL 38 (A) 10/28/2020 11:12 AM    TRIGLYCERIDE 347 (H) 10/28/2020 11:12 AM       Last Microalbumin/Cr:  No results found for: \"MALBCRT\", \"MICROALBUR\"  Last A1c:No, pending labs from [facility]   Lab Results   Component Value Date/Time    HBA1C 11.5 (H) 09/12/2023 09:50 AM      Last Retinal Scan: Retinologist appt completed last Monday.    Monofilament exam: Pending with PCP.     Updated caregaps    Labs  Lab Results   Component Value Date/Time    SODIUM 136 09/20/2023 12:15 AM    POTASSIUM 3.7 09/20/2023 12:15 AM    CHLORIDE 103 09/20/2023 12:15 AM    CO2 23 09/20/2023 12:15 AM    GLUCOSE 167 (H) 09/20/2023 12:15 AM    BUN 14 09/20/2023 12:15 AM    CREATININE 0.93 09/20/2023 12:15 AM     Lab Results   Component Value Date/Time    ALKPHOSPHAT 205 (H) 09/12/2023 09:50 AM    ASTSGOT 13 09/12/2023 09:50 AM    ALTSGPT 21 09/12/2023 09:50 AM    TBILIRUBIN 0.6 09/12/2023 09:50 AM    INR 1.30 (H) 09/15/2023 02:10 PM    ALBUMIN 4.5 09/12/2023 09:50 AM        Physical Examination:  Vital signs: There were no vitals taken for this visit.     Assessment and Plan:    1. DM2  See HPI. His recent T2DM diagnosis is may be d/t necrotizing pancreatitis causing insulin insuffiencey.  Starting GLP1RA and/or SGLT2i is not a contraindication if pt has hx of pancreatitis. We discussed that starting GLP1RA will be most beneficial for glycemic control and cardiovascular protection, especially given pt is s/p CABG.  Discussed Goals: FBG 80 - 130, 2hPP < 180, a1c < 7.0%   Last a1c drawn on 9/12/23 was 11.5%, which is not at goal   Pt states he had [facility]c [facility] Monday [facility]/27 which he states was ~6.5% - will request labs.   Due to pt " "experiencing slight aching pain in pancreas, will NOT up-titrate Trulicity at this time. Will reassess titration during next visit to see if aching has subsided.   Educated pt on increasing water intake as the Trulicity can make him feel full and not want to drink water. Pt confirms plan and stated that the pain does go away when he drinks more water than usual.     - Medication changes:  None at this time.   - Continue:  Metformin to 1000mg bid  Trulicity 0.75mg q 7 days   Lantus 15units qhs for now     - Lifestyle changes:  Diet: Continue to limit carb intake and try to increase water intake, as that helps subside the pancreas \"pain\".   Exercise: Usually walks weekly, but has been limited due to cold weather. Will increase as tolerated.     Follow Up:  1 months    Phuong Watts, Pharmacy Student     Abigail Rasmussen, PharmD    CC:   Dr Jaxon Willingham  "

## 2023-12-04 ENCOUNTER — TELEPHONE (OUTPATIENT)
Dept: VASCULAR LAB | Facility: MEDICAL CENTER | Age: 68
End: 2023-12-04
Payer: MEDICARE

## 2023-12-15 ENCOUNTER — TELEPHONE (OUTPATIENT)
Dept: CARDIOLOGY | Facility: MEDICAL CENTER | Age: 68
End: 2023-12-15
Payer: MEDICARE

## 2023-12-15 NOTE — TELEPHONE ENCOUNTER
Last OV: 10/17/2023  Proposed Surgery: Colonoscopy with Deep Sedation  Surgery Date: 02/12/2024  Requesting Office Name: [facility]   Fax Number: [phone]  Preference of Location (default is surgery center unless specified by Cardiologist or CHRISTO)  Prior Clearance Addressed: No      Anticoags/Antiplatelets: Clopidogrel  and Aspirin  Outstanding Cardiac Imaging : Yes  Echo.   Clearance to provider to review  Stent, Cardiac Devices, or Catheterization: Yes  Within the last 6 months. Forward to provider to review.  Ablation, TAVR/Valve (including open heart), Cardioversion: No  Recent Cardiac Hospitalization: Yes  Date:  9/15/2023            When: Hospitalized in the last 6 months. Forward to provider to review.   History (cardiac history):   Past Medical History:   Diagnosis Date    Allergic rhinitis     Anesthesia     slow to wake up; ?apneic, needs encourangement to breathe    Back pain     Bowel habit changes 09/07/2023    diarrhea at times r/t pancreatitis    Cataract     minimal; NOT surgically corrected    Chickenpox     history of    [place] measles     history of    Heart burn     High cholesterol     medicated    Hx of lithotripsy     Hyperlipidemia     Hypertension     medicated    Influenza     history of    Mumps     history of    Myocardial infarct (HCC) 10/2022    x 3    Nasal drainage     Obesity     Pancreatitis 10/09/2022    Psychiatric problem 09/07/2023    PTSD- not medicated    Pulmonary hypertension (HCC)     Sleep apnea 2015    Uses Bi-pap    Snoring     sleep study done    Tonsillitis     history of    Urinary urgency 09/07/2023             Surgical Clearance Letter Sent: NO. Provider to advise.   **Scan clearance request letter into [facility].**

## 2023-12-15 NOTE — LETTER
PROCEDURE/SURGERY CLEARANCE FORM      Encounter Date: 12/15/2023    Patient: Yadiel Moody  YOB: 1955    CARDIOLOGIST:  Jamshid Lockett M.D.    REFERRING DOCTOR:  No ref. provider found    The above patient is not cleared to have the following procedure/surgery:  Colonoscopy with Deep Sedation                                            Additional comments:  Pending echocardiogram results. Echocardiogram scheduled 01/04/2024

## 2023-12-15 NOTE — LETTER
PROCEDURE/SURGERY CLEARANCE FORM      Encounter Date: 12/15/2023    Patient: Yadiel Moody  YOB: 1955    CARDIOLOGIST:  Jamshid Lockett M.D.    REFERRING DOCTOR:  No ref. provider found    The above patient is cleared to have the following procedure/surgery: Colonoscopy with Deep Sedation                                            Additional comments: Unremarkable echocardiogram noted. OK for surgery from cardiac standpoint with moderate risk. Thanks.  JI       Electronically signed         MD Signature   Jamshid Lockett M.D.

## 2023-12-18 NOTE — TELEPHONE ENCOUNTER
Per DANIELLE Pending echocardiogram results. Please send back after this is completed. Thank you.  DANIELLE.    Letter faxed to [facility] letting them know we are waiting for Echo.

## 2023-12-19 ENCOUNTER — OFFICE VISIT (OUTPATIENT)
Dept: SLEEP MEDICINE | Facility: MEDICAL CENTER | Age: 68
End: 2023-12-19
Attending: STUDENT IN AN ORGANIZED HEALTH CARE EDUCATION/TRAINING PROGRAM
Payer: MEDICARE

## 2023-12-19 VITALS
DIASTOLIC BLOOD PRESSURE: 72 MMHG | HEIGHT: 69 IN | BODY MASS INDEX: 31.87 KG/M2 | RESPIRATION RATE: 16 BRPM | HEART RATE: 73 BPM | SYSTOLIC BLOOD PRESSURE: 114 MMHG | WEIGHT: 215.2 LBS | OXYGEN SATURATION: 95 %

## 2023-12-19 DIAGNOSIS — G47.33 OSA (OBSTRUCTIVE SLEEP APNEA): Primary | ICD-10-CM

## 2023-12-19 PROCEDURE — 3078F DIAST BP <80 MM HG: CPT | Performed by: STUDENT IN AN ORGANIZED HEALTH CARE EDUCATION/TRAINING PROGRAM

## 2023-12-19 PROCEDURE — 99213 OFFICE O/P EST LOW 20 MIN: CPT | Performed by: STUDENT IN AN ORGANIZED HEALTH CARE EDUCATION/TRAINING PROGRAM

## 2023-12-19 PROCEDURE — 3074F SYST BP LT 130 MM HG: CPT | Performed by: STUDENT IN AN ORGANIZED HEALTH CARE EDUCATION/TRAINING PROGRAM

## 2023-12-19 ASSESSMENT — FIBROSIS 4 INDEX: FIB4 SCORE: 1.01

## 2023-12-19 NOTE — PROGRESS NOTES
[facility] Follow-up Visit    CC: Yearly follow-up for management of obstructive sleep apnea      HPI:  Yadiel Moody is a 68 y.o.male  with obesity, CAD status post CABG, history of NSTEMI, and obstructive sleep apnea on BiPAP.  Presents today to follow-up regarding management of obstructive sleep apnea.    He continues to use his BiPAP machine regularly.  He states earlier this year he was hospitalized for some time through multiple different hospitals even in [place].  During that time he did not use his BiPAP machine.  Since returning home from the hospital he has been using his BiPAP machine regularly.  He finds with using BiPAP he gets a better night sleep.  Overall finds the mask and pressures comfortable.    DME provider: Shellie Joel   Device: Dreamstation BiPAP heated tube   Mask: fullface   Aerophagia: No   Snoring: No   Dry mouth: No   Leak: No   Skin irritation: No   Chin strap: No     Sleep History  PSG BiPAP titration from 11/26/19 indicated BiPAP was started at 10/6 cm of water and titrated to 16/12 cm of water.  On BIPAP: 14/10 cm of water the resultant AHI was 5 in NREM sleep, however mean oximetry was 89%. On BiPAP: 16/12 cm of water mean oximetry improved to 91% SPO2 in REM sleep     HST on 2/20/19 by an outlying facility which showed severe NARCISO with AHI of 33.5/hr and O2 caryn 62%.     Patient Active Problem List    Diagnosis Date Noted    Hx of CABG 10/17/2023    Bilateral carotid artery stenosis 10/17/2023    NSTEMI (non-ST elevated myocardial infarction) ([facility]) 08/17/2023    Coronary artery disease of native artery with stable angina pectoris ([facility]) 08/17/2023    Major depressive disorder 01/20/2022    Pre-diabetes 10/28/2020    Morbid obesity with BMI of 40.0-44.9, adult ([facility]) 06/26/2019    NARCISO (obstructive sleep apnea) 04/17/2019    Memory problem 11/10/2017    Spells of decreased attentiveness 11/10/2017    Umbilical hernia 02/23/2015    Shortness of breath 01/19/2013    Obesity  01/19/2013    Unstable angina (HCC) 12/28/2012    Hypertension 12/28/2012    Hyperlipidemia 12/28/2012       Past Medical History:   Diagnosis Date    Allergic rhinitis     Anesthesia     slow to wake up; ?apneic, needs encourangement to breathe    Back pain     Bowel habit changes 09/07/2023    diarrhea at times r/t pancreatitis    Cataract     minimal; NOT surgically corrected    Chickenpox     history of    [place]izen of [place] measles     history of    Heart burn     High cholesterol     medicated    Hx of lithotripsy     Hyperlipidemia     Hypertension     medicated    Influenza     history of    Mumps     history of    Myocardial infarct (HCC) 10/2022    x 3    Nasal drainage     Obesity     Pancreatitis 10/09/2022    Psychiatric problem 09/07/2023    PTSD- not medicated    Pulmonary hypertension ([facility])     Sleep apnea 2015    Uses Bi-pap    Snoring     sleep study done    Tonsillitis     history of    Urinary urgency 09/07/2023        Past Surgical History:   Procedure Laterality Date    MULTIPLE CORONARY ARTERY BYPASS ENDO VEIN HARVEST  9/15/2023    Procedure: CORONARY ARTERY BYPASS GRAFTING X3, ENDOSCOPIC VEIN HARVEST, TRANSESOPHAGEAL ECHOCARDIOGRAM;  Surgeon: Lizet Mann M.D.;  Location: SURGERY [facility];  Service: Cardiac    ECHOCARDIOGRAM, TRANSESOPHAGEAL, INTRAOPERATIVE  9/15/2023    Procedure: ECHOCARDIOGRAM, TRANSESOPHAGEAL, INTRAOPERATIVE;  Surgeon: Lizet Mann M.D.;  Location: SURGERY [facility];  Service: Cardiac    UMBILICAL HERNIA REPAIR  02/23/2015    Performed by John H Ganser, M.D. at [facility] ORS    OTHER ORTHOPEDIC SURGERY Right 2009    Right Knee Repair    OTHER ORTHOPEDIC SURGERY Right 1988    Right Meniscus    [id number]      NASAL RECONSTRUCTION      deviatum septum, sinus surgery around 1998,2013    TONSILLECTOMY      US GASTROINTESTIONAL ENDOSCOPIC      multiple for pancreatitis       Family History   Problem Relation Age of Onset    Heart Attack Father     Heart Disease Father      Sleep Apnea Father        Social History     Socioeconomic History    Marital status:      Spouse name: Not on file    Number of children: Not on file    Years of education: Not on file    Highest education level: Not on file   Occupational History    Not on file   Tobacco Use    Smoking status: Never     Passive exposure: Never    Smokeless tobacco: Never   Vaping Use    Vaping Use: Never used   Substance and Sexual Activity    Alcohol use: Not Currently    Drug use: No    Sexual activity: Not on file   Other Topics Concern    Not on file   Social History Narrative    Not on file     Social Determinants of Health     Financial Resource Strain: Not on file   Food Insecurity: Not on file   Transportation Needs: Not on file   Physical Activity: Not on file   Stress: Not on file   Social Connections: Not on file   Intimate Partner Violence: Not on file   Housing Stability: Not on file       Current Outpatient Medications   Medication Sig Dispense Refill    sertraline (ZOLOFT) 50 MG Tab Take 50 mg by mouth every day.      metformin (GLUCOPHAGE) 1000 MG tablet Take 1 Tablet by mouth 2 times a day with meals. 180 Tablet 1    Dulaglutide (TRULICITY) 0.75 MG/0.5ML Solution Pen-injector Inject 0.5 mL under the skin every 7 days. 2 mL 5    aspirin 81 MG EC tablet Take 1 Tablet by mouth every day. 30 Tablet 2    clopidogrel (PLAVIX) 75 MG Tab Take 1 Tablet by mouth every day. 30 Tablet 2    insulin glargine (LANTUS SOLOSTAR) 100 UNIT/ML Solution Pen-injector injection Inject 15 Units under the skin every evening. 3 mL 5    Blood Glucose Monitoring Suppl (BLOOD GLUCOSE MONITOR SYSTEM) w/Device Kit Test blood sugar as recommended by provider. 1 Kit 0    glucose blood strip Use one strip to test blood sugar three times daily before meals. 100 Strip 0    Lancets Use one lancet to test blood sugar three times daily before meals. 100 Each 0    Insulin Pen Needle 32 G x 4 mm Use one pen needle in pen device to inject  "insulin three times daily. 100 Each 0    cetirizine (ZYRTEC) 10 MG Tab Take 10 mg by mouth every day.      acetaminophen (TYLENOL) 500 MG Tab Take 1,000 mg by mouth 1 time a day as needed for Mild Pain.      tamsulosin (FLOMAX) 0.4 MG capsule Take 0.4 mg by mouth 1/2 hour after breakfast.      Pancrelipase, Lip-Prot-Amyl, (CREON) 61550-943501 units Cap DR Particles Take 2 Tablets by mouth in the morning, at noon, and at bedtime.      metoprolol tartrate (LOPRESSOR) 25 MG Tab Take 1 Tablet by mouth 2 times a day. 180 Tablet 3    atorvastatin (LIPITOR) 40 MG Tab Take 1 Tablet by mouth every evening. 90 Tablet 3    omeprazole (PRILOSEC) 20 MG delayed-release capsule Take 20 mg by mouth every day.      Alcohol Swabs Wipe site with prep pad prior to injection. 100 Each 0     No current facility-administered medications for this visit.        ALLERGIES: Patient has no known allergies.    ROS  Constitutional: Denies fevers, Denies weight changes  Ears/Nose/Throat/Mouth: Denies nasal congestion or sore throat   Cardiovascular: Denies chest pain  Respiratory: Denies shortness of breath, Denies cough  Gastrointestinal/Hepatic: Denies nausea, vomiting  Sleep: see HPI      PHYSICAL EXAM  /72 (BP Location: Left arm, Patient Position: Sitting, BP Cuff Size: Adult)   Pulse 73   Resp 16   Ht 1.753 m (5' 9\")   Wt 97.6 kg (215 lb 3.2 oz)   SpO2 95%   BMI 31.78 kg/m²   Appearance: Well-nourished, well-developed, no acute distress  Eyes:  No scleral icterus , EOMI  Musculoskeletal:  Grossly normal; gait and station normal; digits and nails normal  Skin:  No rashes, petechiae, cyanosis  Neurologic: without focal signs; oriented to person, time, place, and purpose; judgement intact      Medical Decision Making   Assessment and Plan  Yadiel Moody is a 68 y.o.male  with obesity, CAD status post CABG, history of NSTEMI, and obstructive sleep apnea on BiPAP.  Presents today to follow-up regarding management of obstructive " sleep apnea.    The medical record was reviewed.    Obstructive sleep apnea  Compliance data reviewed showing 100% usage > 4hours in last 30  days. Average AHI 1.4 events/hour. Pt continues to use and benefit from machine.      Current Settings BiPAP 8/4    PLAN:   -Order placed for mask and supplies   -Advised to reach out via MyChart with questions     Has been advised to continue the current BiPAP, clean equipment frequently, and get new mask and supplies as allowed by insurance and DME. Recommend an earlier appointment, if significant treatment barriers develop.    Patients with NARCISO are at increased risk of cardiovascular disease including coronary artery disease, systemic arterial hypertension, pulmonary arterial hypertension, cardiac arrythmias, and stroke. The patient was advised to avoid driving a motor vehicle when drowsy.    Positive airway pressure will favorably impact many of the adverse conditions and effects provoked by NARCISO.    Have advised the patient to follow up with the appropriate healthcare practitioners for all other medical problems and issues.    Return in about 1 year (around 12/19/2024).      Please note portions of this record was created using voice recognition software. I have made every reasonable attempt to correct obvious errors, but I expect that there are errors of grammar and possibly content I did not discover before finalizing the note.

## 2023-12-21 ENCOUNTER — TELEPHONE (OUTPATIENT)
Dept: SCHEDULING | Facility: IMAGING CENTER | Age: 68
End: 2023-12-21
Payer: MEDICARE

## 2023-12-21 DIAGNOSIS — E11.9 TYPE 2 DIABETES MELLITUS WITHOUT COMPLICATION, WITHOUT LONG-TERM CURRENT USE OF INSULIN (HCC): ICD-10-CM

## 2023-12-21 NOTE — TELEPHONE ENCOUNTER
Unable to reach pt as phone rings w/o option to leave a VM- but I sent refills for test strips and needles to [facility] in [place]. See refill encounter  Abigail Rasmussen, PharmD

## 2023-12-21 NOTE — TELEPHONE ENCOUNTER
Caller: Yadiel Moody     Topic/issue: Pt states he is needing the needles and test strips for his medication. We ordered his Lancents but he is out of the needles.   Please advise    Callback Number: [phone] (home)      Thank You    Sherrell BRAVO

## 2023-12-28 ENCOUNTER — HOSPITAL ENCOUNTER (OUTPATIENT)
Dept: LAB | Facility: MEDICAL CENTER | Age: 68
End: 2023-12-28
Attending: INTERNAL MEDICINE
Payer: MEDICARE

## 2023-12-28 DIAGNOSIS — E78.00 PURE HYPERCHOLESTEROLEMIA: ICD-10-CM

## 2023-12-28 LAB
ALBUMIN SERPL BCP-MCNC: 4.5 G/DL (ref 3.2–4.9)
ALBUMIN/GLOB SERPL: 1.6 G/DL
ALP SERPL-CCNC: 129 U/L (ref 30–99)
ALT SERPL-CCNC: 17 U/L (ref 2–50)
ANION GAP SERPL CALC-SCNC: 11 MMOL/L (ref 7–16)
AST SERPL-CCNC: 19 U/L (ref 12–45)
BILIRUB SERPL-MCNC: 0.4 MG/DL (ref 0.1–1.5)
BUN SERPL-MCNC: 16 MG/DL (ref 8–22)
CALCIUM ALBUM COR SERPL-MCNC: 8.8 MG/DL (ref 8.5–10.5)
CALCIUM SERPL-MCNC: 9.2 MG/DL (ref 8.5–10.5)
CHLORIDE SERPL-SCNC: 92 MMOL/L (ref 96–112)
CHOLEST SERPL-MCNC: 129 MG/DL (ref 100–199)
CO2 SERPL-SCNC: 24 MMOL/L (ref 20–33)
CREAT SERPL-MCNC: 0.78 MG/DL (ref 0.5–1.4)
GFR SERPLBLD CREATININE-BSD FMLA CKD-EPI: 98 ML/MIN/1.73 M 2
GLOBULIN SER CALC-MCNC: 2.9 G/DL (ref 1.9–3.5)
GLUCOSE SERPL-MCNC: 113 MG/DL (ref 65–99)
HDLC SERPL-MCNC: 45 MG/DL
LDLC SERPL CALC-MCNC: 56 MG/DL
POTASSIUM SERPL-SCNC: 5 MMOL/L (ref 3.6–5.5)
PROT SERPL-MCNC: 7.4 G/DL (ref 6–8.2)
SODIUM SERPL-SCNC: 127 MMOL/L (ref 135–145)
TRIGL SERPL-MCNC: 141 MG/DL (ref 0–149)

## 2023-12-28 PROCEDURE — 80061 LIPID PANEL: CPT

## 2023-12-28 PROCEDURE — 80053 COMPREHEN METABOLIC PANEL: CPT

## 2023-12-28 PROCEDURE — 36415 COLL VENOUS BLD VENIPUNCTURE: CPT

## 2023-12-29 ENCOUNTER — TELEPHONE (OUTPATIENT)
Dept: CARDIOLOGY | Facility: MEDICAL CENTER | Age: 68
End: 2023-12-29
Payer: MEDICARE

## 2023-12-29 DIAGNOSIS — I10 PRIMARY HYPERTENSION: ICD-10-CM

## 2023-12-30 NOTE — TELEPHONE ENCOUNTER
Phone Number Called: [phone]    Call outcome: Spoke to patient regarding message below.    Message: Called to advise of SC recommendations.  Pt agreeable to complete In a month and follow up w/ PCP     Labs reordered

## 2023-12-30 NOTE — TELEPHONE ENCOUNTER
----- Message from CAESAR Martins sent at 12/29/2023  2:47 PM PST -----  Repeat bmp in 1 month, follow up with PCP on result. Which med were you trying to refill? SC  ----- Message -----  From: Nevin Reeves R.N.  Sent: 12/29/2023   1:37 PM PST  To: Nevin Reeves R.N.; #    JI patient, JI BOBBY  SC: Please advise as ADA. Critical Na of 127 per refill protocol.

## 2024-01-04 ENCOUNTER — HOSPITAL ENCOUNTER (OUTPATIENT)
Dept: CARDIOLOGY | Facility: MEDICAL CENTER | Age: 69
End: 2024-01-04
Attending: INTERNAL MEDICINE
Payer: MEDICARE

## 2024-01-04 ENCOUNTER — TELEPHONE (OUTPATIENT)
Dept: VASCULAR LAB | Facility: MEDICAL CENTER | Age: 69
End: 2024-01-04

## 2024-01-04 ENCOUNTER — NON-PROVIDER VISIT (OUTPATIENT)
Dept: VASCULAR LAB | Facility: MEDICAL CENTER | Age: 69
End: 2024-01-04
Attending: INTERNAL MEDICINE
Payer: MEDICARE

## 2024-01-04 DIAGNOSIS — Z95.1 HX OF CABG: ICD-10-CM

## 2024-01-04 DIAGNOSIS — I25.118 CORONARY ARTERY DISEASE OF NATIVE ARTERY OF NATIVE HEART WITH STABLE ANGINA PECTORIS (HCC): ICD-10-CM

## 2024-01-04 DIAGNOSIS — E11.9 TYPE 2 DIABETES MELLITUS WITHOUT COMPLICATION, WITHOUT LONG-TERM CURRENT USE OF INSULIN (HCC): ICD-10-CM

## 2024-01-04 DIAGNOSIS — I10 PRIMARY HYPERTENSION: ICD-10-CM

## 2024-01-04 PROCEDURE — 93306 TTE W/DOPPLER COMPLETE: CPT

## 2024-01-04 PROCEDURE — 99212 OFFICE O/P EST SF 10 MIN: CPT

## 2024-01-04 RX ORDER — DAPAGLIFLOZIN 10 MG/1
10 TABLET, FILM COATED ORAL DAILY
Qty: 30 TABLET | Refills: 1 | Status: SHIPPED | OUTPATIENT
Start: 2024-01-04 | End: 2024-02-13 | Stop reason: SDUPTHER

## 2024-01-04 NOTE — PROGRESS NOTES
Patient Consult Note    Primary care physician: Trent Doll M.D.    Reason for consult: Management of Uncontrolled Type 2 Diabetes    HPI:  Yadiel Moody is a 67 y.o. old patient who comes in today for evaluation of above stated problem.    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 Pt was informed that his gallstone may have perforated his pancreas during a fall, causing the pancreatitis. He was recently diagnosed with T2DM during his recent hospital visit s/p CABG.    Allergies  Patient has no known allergies.    Current Diabetes Medication Regimen  Metformin IR: 1000mg bid  GLP1RA: Trulicity 0.75mg q 7 days - stopped 2 weeks ago  Basal Insulin: Lantus 15 units under the skin every evening    Previous Diabetes Medications and Reason for Discontinuation  none    Potential Barriers to Care:  Adherence: reports missed doses (see above)  Side effects: Dull ache in pancreas with Trulicity persisted and resolved upon discontinuation  Affordability: Yes    SMBG  Pt has home glucometer and proper testing technique - Yes  Discussed BG Goals: FBG 80 - 130, 2hPP < 180, A1c < 7%    Pt reports blood sugars:   Before Breakfast: recently 100-117  Before Bedtime: , highest 199 x1    Hypoglycemia  Hypoglycemia awareness: Yes  Nocturnal hypoglycemia: None  Hypoglycemia:  None  Pt's treatment of Hypoglycemia  Discussed 15:15 Rule    Lifestyle  Current Exercise - Walk a couple miles 4-5 x a week, but has been limited due to cold weather    Exercise Goal - 150 mins/week 3-4 times    Dietary: being more conscious about carb intake  Breakfast - Eggs with ham and cheese, toast, oatmeal, cheerios   Lunch - Peanut butter, lunch meats, open faced sandwich with high grains/low carb bread, fruit, tomatoes  Dinner - Keto pasta, apple, lamb, broccoli, small meat of some kind with vegetable, limits carbs    Snacks - Cheese and ham or turkey, small bowl of cereal    Drinks - Black decaf coffee, about a quart of  "water a day sometimes less, unsweetened iced tea    Preventative Management  BP regimen (ACEi/ARB): lisinopril d/c'd by Dr Lockett in May 2023  BP <140/90:120/82  Statin: Atorvastatin 40 mg QD  LDL <100: Yes  Lab Results   Component Value Date/Time    CHOLSTRLTOT 129 12/28/2023 11:56 AM    LDL 56 12/28/2023 11:56 AM    HDL 45 12/28/2023 11:56 AM    TRIGLYCERIDE 141 12/28/2023 11:56 AM       Last Microalbumin/Cr:  No results found for: \"MALBCRT\", \"MICROALBUR\"  Last A1c:  Lab Results   Component Value Date/Time    HBA1C 6.9 (A) 11/07/2023 12:00 AM      Last Retinal Scan: 11/2023    Monofilament exam: 11/2023    Updated caregaps    Labs  Lab Results   Component Value Date/Time    SODIUM 127 (L) 12/28/2023 11:56 AM    POTASSIUM 5.0 12/28/2023 11:56 AM    CHLORIDE 92 (L) 12/28/2023 11:56 AM    CO2 24 12/28/2023 11:56 AM    GLUCOSE 113 (H) 12/28/2023 11:56 AM    BUN 16 12/28/2023 11:56 AM    CREATININE 0.78 12/28/2023 11:56 AM     Lab Results   Component Value Date/Time    ALKPHOSPHAT 129 (H) 12/28/2023 11:56 AM    ASTSGOT 19 12/28/2023 11:56 AM    ALTSGPT 17 12/28/2023 11:56 AM    TBILIRUBIN 0.4 12/28/2023 11:56 AM    INR 1.30 (H) 09/15/2023 02:10 PM    ALBUMIN 4.5 12/28/2023 11:56 AM        Physical Examination:  Vital signs: There were no vitals taken for this visit.     Assessment and Plan:    1. DM2  See HPI. His recent T2DM diagnosis is may be d/t necrotizing pancreatitis causing insulin insuffiencey.  Starting GLP1RA and/or SGLT2i is not a contraindication if pt has hx of pancreatitis.   Unfortunately pt has had ADRs with Trulcity. Will not re-trial.  He is amenable to starting SGLT2i to minimize insulin use  Discussed Goals: FBG 80 - 130, 2hPP < 180, a1c < 7.0%   Last a1c drawn on 11/7/23 was 6.9%, which is at goal     - Medication changes:  STOP Trulicity 0.75mg q 7 days  START Farxiga 10mg daily  - Continue:  Metformin to 1000mg bid  Lantus 15units qhs for now. If BG<70, reduce to 10units qhs and call our " clinic    - Lifestyle changes:  Diet: Continue to limit carb intake and increase water intake  Exercise: Usually walks weekly, but has been limited due to cold weather. Will increase as tolerated.     Follow Up:  1 month    Abigail Rasmussen, ShaniquaD

## 2024-01-04 NOTE — TELEPHONE ENCOUNTER
Received New start PA request via [employer]  for FARXIGA 10MG TABLETS. (Quantity:90, Day Supply:90)     Insurance: RX [facility]   Member ID:  [id number]  BIN: [id number]  PCN: [id number]  Group: [id number]     Ran Test claim via [place] & medication Pays for a $60/90DS ; $20/30DS copay. Will outreach to patient to offer specialty pharmacy services and or release to preferred pharmacy    SHANE Dent, PhT  Pharmacy Liaison (Rx Coordinator)  P: [phone]  1/4/2024 1:53 PM

## 2024-01-05 LAB
LV EJECT FRACT  99904: 60
LV EJECT FRACT MOD 2C 99903: 62.65
LV EJECT FRACT MOD 4C 99902: 59.27
LV EJECT FRACT MOD BP 99901: 60.4

## 2024-01-05 PROCEDURE — 93306 TTE W/DOPPLER COMPLETE: CPT | Mod: 26 | Performed by: INTERNAL MEDICINE

## 2024-01-05 NOTE — TELEPHONE ENCOUNTER
Per DANIELLE -   Unremarkable echocardiogram noted. OK for surgery from cardiac standpoint with moderate risk. Thanks.  DANIELLE    Letter faxed.

## 2024-01-10 ENCOUNTER — TELEPHONE (OUTPATIENT)
Dept: CARDIOLOGY | Facility: MEDICAL CENTER | Age: 69
End: 2024-01-10
Payer: MEDICARE

## 2024-01-10 DIAGNOSIS — E11.9 TYPE 2 DIABETES MELLITUS WITHOUT COMPLICATION, WITHOUT LONG-TERM CURRENT USE OF INSULIN (HCC): ICD-10-CM

## 2024-01-10 RX ORDER — INSULIN GLARGINE 100 [IU]/ML
15 INJECTION, SOLUTION SUBCUTANEOUS EVERY EVENING
Qty: 3 ML | Refills: 5 | OUTPATIENT
Start: 2024-01-10

## 2024-01-10 NOTE — LETTER
PROCEDURE/SURGERY CLEARANCE FORM      Encounter Date: 1/10/2024    Patient: Yadiel Moody  YOB: 1955    PROCEDURE/SURGERY CLEARANCE FORM    Date: 1/23/2024   Patient Name: Yadiel Moody    Dear Surgeon or Proceduralist,      Thank you for your request for cardiac stratification of our mutual patient Yadiel Moody 1955. We have reviewed their [facility] records; and to the best of our understanding this patient has not had stenting, ablation, cardiothoracic surgery or hospitalization for cardiovascular reasons in the past 6 months.  Yadiel Moody has been seen within the past 18 months and is considered to have non-modifiable cardiac risk for this low-risk procedure/surgery. They may proceed from a cardiovascular standpoint and may hold their antiplatelet/anticoagulation as briefly as possible. Please have patient resume this medication when hemodynamically stable to do so.     Aspirin or Prasugrel   - hold 7 days prior to procedure/surgery, resume when hemodynamically stable      Clopidrogrel or Ticagrelor  - hold 7 days for all neurological procedures, hold 5 days prior to all other procedure/surgery,  resume when hemodynamically stable     Warfarin - hold 7 days for all neurological procedures, hold 5 days prior to all other procedure/surgery and coordinate with [facility] ([phone]) INR testing and dose management.      Pradaxa/Xarelto/Eliquis/Savesya - hold 1 day prior to procedure for low bleeding risk procedure, 2 days for high bleeding risk procedure, or consider holding 3 days or longer for patients with reduced kidney function (CrCl <30mL/min) or spinal/cranial surgeries/procedures.      If they have a mechanical heart valve, please coordinate with [facility] ([phone]) the proper management of their anticoagulant in the periprocedural or perioperative period.      Some patients have higher risk for cardiovascular  complications or holding medication. If our patient has had prior complications of holding antiplatelet or anticoagulants in the past and we have seen them after these events, we have addressed these concerns with the patient. They are at an unknown degree of increased risk for recurrent complication.  You may hold anticoagulation/antiplatelets for the procedure or surgery if the benefits of the procedure or surgery outweigh this nonmodifiable risk.      If Yadiel Moody 1955 has new symptoms of heart failure decompensation, unstable arrythmia, or angina please reach out and we will assess the patient.      If you have other patient-specific concerns, please feel free to reach out to the patient's cardiologist directly at [phone].     Thank you,       [facility]

## 2024-01-10 NOTE — LETTER
PROCEDURE/SURGERY CLEARANCE FORM      Encounter Date: 1/10/2024    Patient: Yadiel Moody  YOB: 1955      Dear Surgeon or Proceduralist,      Thank you for your request for cardiac stratification of our mutual patient Yadiel Moody 1955. We have reviewed their [facility] records; and to the best of our understanding this patient has not had stenting, ablation, cardiothoracic surgery or hospitalization for cardiovascular reasons in the past 6 months.  Yadiel Moody has been seen within the past 18 months and is considered to have non-modifiable cardiac risk for this low-risk procedure/surgery. They may proceed from a cardiovascular standpoint and may hold their antiplatelet/anticoagulation as briefly as possible. Please have patient resume this medication when hemodynamically stable to do so.     Aspirin or Prasugrel   - hold 7 days prior to procedure/surgery, resume when hemodynamically stable      Clopidrogrel or Ticagrelor  - hold 7 days for all neurological procedures, hold 5 days prior to all other procedure/surgery,  resume when hemodynamically stable     Warfarin - hold 7 days for all neurological procedures, hold 5 days prior to all other procedure/surgery and coordinate with [facility] ([phone]) INR testing and dose management.      Pradaxa/Xarelto/Eliquis/Savesya - hold 1 day prior to procedure for low bleeding risk procedure, 2 days for high bleeding risk procedure, or consider holding 3 days or longer for patients with reduced kidney function (CrCl <30mL/min) or spinal/cranial surgeries/procedures.      If they have a mechanical heart valve, please coordinate with [facility] ([phone]) the proper management of their anticoagulant in the periprocedural or perioperative period.      Some patients have higher risk for cardiovascular complications or holding medication. If our patient has had prior complications of holding  antiplatelet or anticoagulants in the past and we have seen them after these events, we have addressed these concerns with the patient. They are at an unknown degree of increased risk for recurrent complication.  You may hold anticoagulation/antiplatelets for the procedure or surgery if the benefits of the procedure or surgery outweigh this nonmodifiable risk.      If Yadiel Moody 1955 has new symptoms of heart failure decompensation, unstable arrythmia, or angina please reach out and we will assess the patient.      If you have other patient-specific concerns, please feel free to reach out to the patient's cardiologist directly at [phone].     Thank you,       [facility]

## 2024-01-10 NOTE — TELEPHONE ENCOUNTER
DANIELLE Ayala: Griselda at [facility]     Topic/issue: The office received the clearance but they need clearance for the coumadin medication please advise     Callback Number: [phone]  Fax Number: [phone] or [phone]    Thank You   Elodia DIAZ

## 2024-01-11 NOTE — TELEPHONE ENCOUNTER
Phone Number Called: [phone]     Call outcome:  Left detailed msg for Griselda and requested call back if she has any questions    Message: Called to discuss message regarding coumadin. There is no record from pt chart indicating he takes Coumadin and if so, it is not prescribed by our office.

## 2024-01-23 NOTE — TELEPHONE ENCOUNTER
Aspirin Managed by Cardiovascular Surgery team, pt released by team to Cardiology and PCP    Clearance letter sent 01/05/24 by DANIELLE SANTOS , verbal okay to send letter regarding Aspirin & Plavix  ================  Phone Number Called:  [phone]     Call outcome:  Left voicemail for Griselda    Message: Called to discuss faxed letter.

## 2024-01-23 NOTE — TELEPHONE ENCOUNTER
DANIELLE        Caller: Griselda with [facility]    Topic/issue: Their office was calling to follow up on an approval for a hold the patient's clopidogrel (PLAVIX) 75 MG Tab medication and their office was asking for a call back to get an approval on this      Callback Number: [phone]      Thank you    -Chapincito VALIENTE

## 2024-02-13 DIAGNOSIS — E11.65 TYPE 2 DIABETES MELLITUS WITH HYPERGLYCEMIA, WITH LONG-TERM CURRENT USE OF INSULIN (HCC): ICD-10-CM

## 2024-02-13 DIAGNOSIS — E11.9 TYPE 2 DIABETES MELLITUS WITHOUT COMPLICATION, WITHOUT LONG-TERM CURRENT USE OF INSULIN (HCC): ICD-10-CM

## 2024-02-13 DIAGNOSIS — Z79.4 TYPE 2 DIABETES MELLITUS WITH HYPERGLYCEMIA, WITH LONG-TERM CURRENT USE OF INSULIN (HCC): ICD-10-CM

## 2024-02-13 RX ORDER — DAPAGLIFLOZIN 10 MG/1
10 TABLET, FILM COATED ORAL DAILY
Qty: 30 TABLET | Refills: 1 | Status: SHIPPED | OUTPATIENT
Start: 2024-02-13 | End: 2024-03-27

## 2024-02-13 NOTE — TELEPHONE ENCOUNTER
Received request via: Patient    Was the patient seen in the last year in this department? Yes    Does the patient have an active prescription (recently filled or refills available) for medication(s) requested? Yes.       Pharmacy Name: [facility] at [address] [place] [address]      Does the patient have care home Plus and need 100 day supply (blood pressure, diabetes and cholesterol meds only)? Patient does not have SCP        Thank you    -Chapincito VALIENTE

## 2024-02-14 RX ORDER — BLOOD SUGAR DIAGNOSTIC
STRIP MISCELLANEOUS
Qty: 100 STRIP | Refills: 3 | Status: SHIPPED | OUTPATIENT
Start: 2024-02-14 | End: 2024-02-29

## 2024-02-29 ENCOUNTER — NON-PROVIDER VISIT (OUTPATIENT)
Dept: VASCULAR LAB | Facility: MEDICAL CENTER | Age: 69
End: 2024-02-29
Attending: INTERNAL MEDICINE
Payer: MEDICARE

## 2024-02-29 VITALS — SYSTOLIC BLOOD PRESSURE: 121 MMHG | HEART RATE: 74 BPM | DIASTOLIC BLOOD PRESSURE: 81 MMHG

## 2024-02-29 DIAGNOSIS — Z79.4 TYPE 2 DIABETES MELLITUS WITH HYPERGLYCEMIA, WITH LONG-TERM CURRENT USE OF INSULIN (HCC): ICD-10-CM

## 2024-02-29 DIAGNOSIS — E11.65 TYPE 2 DIABETES MELLITUS WITH HYPERGLYCEMIA, WITH LONG-TERM CURRENT USE OF INSULIN (HCC): ICD-10-CM

## 2024-02-29 LAB
HBA1C MFR BLD: 6.8 % (ref ?–5.8)
POCT INT CON NEG: NEGATIVE
POCT INT CON POS: POSITIVE

## 2024-02-29 PROCEDURE — 99212 OFFICE O/P EST SF 10 MIN: CPT

## 2024-02-29 PROCEDURE — 83036 HEMOGLOBIN GLYCOSYLATED A1C: CPT

## 2024-02-29 RX ORDER — SEMAGLUTIDE 0.68 MG/ML
0.25 INJECTION, SOLUTION SUBCUTANEOUS
Qty: 3 ML | Refills: 1 | Status: SHIPPED | OUTPATIENT
Start: 2024-02-29

## 2024-02-29 NOTE — PROGRESS NOTES
Patient Consult Note    Primary care physician: Trent Doll M.D.    Reason for consult: Management of Uncontrolled Type 2 Diabetes    HPI:  Yadiel Moody is a 67 y.o. old patient who comes in today for evaluation of above stated problem.    Allergies  Patient has no known allergies.    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 Pt was informed that his gallstone may have perforated his pancreas during a fall, causing the pancreatitis.  States he does not think Farxiga is helping with his BG. Would be interested in trying Ozempic despite previous ADRs to Trulicity. States BG readings were lower on Trulicity vs Farxiga. Long term goal is to be able to d/c insulin therapy.    SMBG  Pt has home glucometer and proper testing technique - Yes, requesting for Rx for One Touch Verio strips (pharmacy has been dispensing One Touch Ultra)  Discussed BG Goals: FBG 80 - 130, 2hPP < 180, A1c < 7%    Pt reports blood sugars:   Before Breakfast: 141 307 233 157 221 124 216 187 192  Before Bedtime: 187 122 156 156 130 158 142 140 139    Hypoglycemia  Hypoglycemia awareness: Yes  Nocturnal hypoglycemia: None  Hypoglycemia:  None  Pt's treatment of Hypoglycemia  Discussed 15:15 Rule    Lifestyle  Current Exercise - Walks a couple miles 4-5 x a week, started going back to the gym this week and plans to increase this gradually  Exercise Goal - 150 mins/week 3-4 times    Dietary: being more conscious about carb intake  Breakfast - Eggs with ham and cheese, toast, oatmeal, cheerios   Lunch - Peanut butter,  "lunch meats, open faced sandwich with high grains/low carb bread, fruit, tomatoes  Dinner - Keto pasta, apple, lamb, broccoli, small meat of some kind with vegetable, limits carbs    Snacks - Cheese and ham or turkey, small bowl of cereal    Drinks - Black decaf coffee, about a quart of water a day sometimes less, unsweetened iced tea    Preventative Management  BP regimen (ACEi/ARB): lisinopril d/c'd by Dr Lockett in May 2023  BP <140/90:120/82  Statin: Atorvastatin 40 mg QD  LDL <100: Yes  Lab Results   Component Value Date/Time    CHOLSTRLTOT 129 12/28/2023 11:56 AM    LDL 56 12/28/2023 11:56 AM    HDL 45 12/28/2023 11:56 AM    TRIGLYCERIDE 141 12/28/2023 11:56 AM       Last Microalbumin/Cr:  No results found for: \"MALBCRT\", \"MICROALBUR\"  Last A1c:  Lab Results   Component Value Date/Time    HBA1C 6.8 (A) 02/29/2024 12:00 AM      Last Retinal Scan: 11/2023    Monofilament exam: 11/2023    Updated caregaps    Labs  Lab Results   Component Value Date/Time    SODIUM 127 (L) 12/28/2023 11:56 AM    POTASSIUM 5.0 12/28/2023 11:56 AM    CHLORIDE 92 (L) 12/28/2023 11:56 AM    CO2 24 12/28/2023 11:56 AM    GLUCOSE 113 (H) 12/28/2023 11:56 AM    BUN 16 12/28/2023 11:56 AM    CREATININE 0.78 12/28/2023 11:56 AM     Lab Results   Component Value Date/Time    ALKPHOSPHAT 129 (H) 12/28/2023 11:56 AM    ASTSGOT 19 12/28/2023 11:56 AM    ALTSGPT 17 12/28/2023 11:56 AM    TBILIRUBIN 0.4 12/28/2023 11:56 AM    INR 1.30 (H) 09/15/2023 02:10 PM    ALBUMIN 4.5 12/28/2023 11:56 AM        Physical Examination:  Vital signs: /81   Pulse 74      Assessment and Plan:    1. DM2  Discussed Goals: FBG 80 - 130, 2hPP < 180, a1c < 7.0%   A1c shows slight improvement from 6.9% (11/2023) to 6.8% today, A1c remains at goal   SMBG mostly above goal but possibly multifactorial  BG readings when he was on basal insulin, metformin, and Farxiga were probably not reflective of full effect of Farxiga as he initiated this in 01/2024  He has been " off of metformin x 3 weeks, so recent BG readings are only reflective of basal insulin and Farxiga  Pt is tolerating his current regimen, but he is interested in resuming GLP1a due to previously better BG control on Trulicity and since he would like to try to d/c insulin therapy in the future  Reasonable to trial Ozempic at this time, but advised that if he develops pain in pancreas, given hx of pancreatitis, will likely have to d/c the medication and re-evaluate pharmacotherapy options.    - Medication changes:  START Ozempic 0.25 mg weekly    - Continue:  Metformin 1000mg bid  Farxiga 10mg daily  Lantus 15units qhs for now. If BG<70, reduce to 10units qhs and call our clinic    - Lifestyle changes:  Diet: Continue to limit carb intake and increase water intake  Exercise: Increase as tolerated.     Follow Up:  1 month    Reba George, PharmD

## 2024-03-27 ENCOUNTER — NON-PROVIDER VISIT (OUTPATIENT)
Dept: VASCULAR LAB | Facility: MEDICAL CENTER | Age: 69
End: 2024-03-27
Attending: INTERNAL MEDICINE
Payer: MEDICARE

## 2024-03-27 ENCOUNTER — TELEPHONE (OUTPATIENT)
Dept: PHARMACY | Facility: MEDICAL CENTER | Age: 69
End: 2024-03-27
Payer: MEDICARE

## 2024-03-27 VITALS — SYSTOLIC BLOOD PRESSURE: 105 MMHG | DIASTOLIC BLOOD PRESSURE: 70 MMHG | HEART RATE: 76 BPM

## 2024-03-27 DIAGNOSIS — E11.65 TYPE 2 DIABETES MELLITUS WITH HYPERGLYCEMIA, WITH LONG-TERM CURRENT USE OF INSULIN (HCC): ICD-10-CM

## 2024-03-27 DIAGNOSIS — Z79.4 TYPE 2 DIABETES MELLITUS WITH HYPERGLYCEMIA, WITH LONG-TERM CURRENT USE OF INSULIN (HCC): ICD-10-CM

## 2024-03-27 DIAGNOSIS — E11.9 TYPE 2 DIABETES MELLITUS WITHOUT COMPLICATION, WITHOUT LONG-TERM CURRENT USE OF INSULIN (HCC): ICD-10-CM

## 2024-03-27 PROCEDURE — 99212 OFFICE O/P EST SF 10 MIN: CPT

## 2024-03-27 RX ORDER — DAPAGLIFLOZIN 10 MG/1
10 TABLET, FILM COATED ORAL DAILY
Qty: 90 TABLET | Refills: 1 | Status: SHIPPED | OUTPATIENT
Start: 2024-03-27

## 2024-03-27 RX ORDER — METFORMIN HYDROCHLORIDE 500 MG/1
1000 TABLET, EXTENDED RELEASE ORAL 2 TIMES DAILY
Qty: 360 TABLET | Refills: 1 | Status: SHIPPED | OUTPATIENT
Start: 2024-03-27

## 2024-03-27 NOTE — TELEPHONE ENCOUNTER
dapagliflozin propanediol (FARXIGA) 10 MG Tab    Received Renewal PA request via [employer]  for Farxiga. (Quantity:90, Day Supply:90)     Insurance: Medicare  Member ID:  [id number]  BIN: [id number]  PCN: [id number]  Group: [id number]     Ran Test claim via [place] & medication Pays for a $60 copay. Will outreach to patient to offer specialty pharmacy services and or release to preferred pharmacy    $20. qty30T/30DS

## 2024-03-28 ENCOUNTER — TELEPHONE (OUTPATIENT)
Dept: VASCULAR LAB | Facility: MEDICAL CENTER | Age: 69
End: 2024-03-28
Payer: MEDICARE

## 2024-03-28 NOTE — TELEPHONE ENCOUNTER
Attempted to contact patient at [phone] to discuss [facility] and services/benefits offered. No answer.    Sima   Rx Coordinator

## 2024-04-26 DIAGNOSIS — Z79.4 TYPE 2 DIABETES MELLITUS WITH HYPERGLYCEMIA, WITH LONG-TERM CURRENT USE OF INSULIN (HCC): ICD-10-CM

## 2024-04-26 DIAGNOSIS — E11.9 TYPE 2 DIABETES MELLITUS WITHOUT COMPLICATION, WITHOUT LONG-TERM CURRENT USE OF INSULIN (HCC): ICD-10-CM

## 2024-04-26 DIAGNOSIS — E11.65 TYPE 2 DIABETES MELLITUS WITH HYPERGLYCEMIA, WITH LONG-TERM CURRENT USE OF INSULIN (HCC): ICD-10-CM

## 2024-04-29 RX ORDER — DAPAGLIFLOZIN 10 MG/1
10 TABLET, FILM COATED ORAL
Qty: 100 TABLET | Refills: 1 | Status: SHIPPED | OUTPATIENT
Start: 2024-04-29

## 2024-05-15 NOTE — TELEPHONE ENCOUNTER
From: Yadiel Moody  To: Brianna Morales M.D.  Sent: 12/24/2019 1:39 AM PST  Subject: Test Result Question    I was looking to see when my EEG results would be posted and when they would be scheduling a follow up with Dr EMERSON for review.   Pt denies h/o COVID-19 infection in the last 90 days.

## 2024-05-16 DIAGNOSIS — I10 PRIMARY HYPERTENSION: ICD-10-CM

## 2024-05-16 DIAGNOSIS — E78.00 PURE HYPERCHOLESTEROLEMIA: ICD-10-CM

## 2024-05-16 RX ORDER — ATORVASTATIN CALCIUM 40 MG/1
40 TABLET, FILM COATED ORAL NIGHTLY
Qty: 90 TABLET | Refills: 1 | Status: SHIPPED | OUTPATIENT
Start: 2024-05-16

## 2024-06-12 ENCOUNTER — NON-PROVIDER VISIT (OUTPATIENT)
Dept: VASCULAR LAB | Facility: MEDICAL CENTER | Age: 69
End: 2024-06-12
Attending: INTERNAL MEDICINE
Payer: MEDICARE

## 2024-06-12 VITALS — SYSTOLIC BLOOD PRESSURE: 126 MMHG | DIASTOLIC BLOOD PRESSURE: 84 MMHG | HEART RATE: 51 BPM

## 2024-06-12 DIAGNOSIS — Z79.4 TYPE 2 DIABETES MELLITUS WITH HYPERGLYCEMIA, WITH LONG-TERM CURRENT USE OF INSULIN (HCC): ICD-10-CM

## 2024-06-12 DIAGNOSIS — E11.65 TYPE 2 DIABETES MELLITUS WITH HYPERGLYCEMIA, WITH LONG-TERM CURRENT USE OF INSULIN (HCC): ICD-10-CM

## 2024-06-12 LAB
HBA1C MFR BLD: 7.9 % (ref ?–5.8)
POCT INT CON NEG: NEGATIVE
POCT INT CON POS: POSITIVE

## 2024-06-12 PROCEDURE — 99212 OFFICE O/P EST SF 10 MIN: CPT

## 2024-06-12 PROCEDURE — 83036 HEMOGLOBIN GLYCOSYLATED A1C: CPT

## 2024-06-12 RX ORDER — BLOOD-GLUCOSE SENSOR
1 EACH MISCELLANEOUS
Qty: 2 EACH | Refills: 11 | Status: SHIPPED | OUTPATIENT
Start: 2024-06-12

## 2024-06-12 NOTE — PROGRESS NOTES
Patient Consult Note    TIME IN: 9:33 am  TIME OUT: 10:00 am    Primary care physician: Trent Doll M.D.    Reason for consult: Management of Uncontrolled Type 2 Diabetes    HPI:  Yadiel Moody is a 68 y.o. old patient who comes in today for evaluation of above stated problem.    Allergies  Trulicity [dulaglutide]    Current Diabetes Medication Regimen  Metformin ER: 1000 mg BID  SGLT-2 Inhibitor: dapagliflozin (Farxiga) 10 mg daily    Basal Insulin: Lantus 15 units sq qhs    Previous Diabetes Medications and Reason for Discontinuation  GLP-1 or GLP-1/GIP Agent: Ozempic - decided against starting due to concern for necrotizing pancreatitis     Potential Barriers to Care:  Adherence: denies missed doses  Side effects: none  Affordability: no issue    SMBG  Pt has home glucometer and proper testing technique - yes  Discussed BG Goals: FBG 80 - 130, 2hPP < 180, A1c < 7.0%    Pt reports blood sugars:   After Dinner: 168-low 200's      Hypoglycemia  Hypoglycemia awareness: No   Nocturnal hypoglycemia: None  Hypoglycemia:  None  Pt's treatment of Hypoglycemia  Discussed 15:15 Rule    Lifestyle  Current Exercise - has , goes once per week. Works out on his own doing weights/cardio    Dietary - Struggles to eat healthier when he is on the road, tries to limit carbohydrate intake. Sometimes has a cheat meal in the evening and notices his blood sugars increase.     Labs  Lab Results   Component Value Date/Time    HBA1C 7.9 (A) 06/12/2024 12:00 AM      Lab Results   Component Value Date/Time    SODIUM 127 (L) 12/28/2023 11:56 AM    POTASSIUM 5.0 12/28/2023 11:56 AM    CHLORIDE 92 (L) 12/28/2023 11:56 AM    CO2 24 12/28/2023 11:56 AM    GLUCOSE 113 (H) 12/28/2023 11:56 AM    BUN 16 12/28/2023 11:56 AM    CREATININE 0.78 12/28/2023 11:56 AM     Lab Results   Component Value Date/Time    ALKPHOSPHAT 129 (H) 12/28/2023 11:56 AM    ASTSGOT 19 12/28/2023 11:56 AM    ALTSGPT 17 12/28/2023 11:56 AM    TBILIRUBIN 0.4  "12/28/2023 11:56 AM    INR 1.30 (H) 09/15/2023 02:10 PM    ALBUMIN 4.5 12/28/2023 11:56 AM      Lab Results   Component Value Date/Time    CHOLSTRLTOT 129 12/28/2023 11:56 AM    LDL 56 12/28/2023 11:56 AM    HDL 45 12/28/2023 11:56 AM    TRIGLYCERIDE 141 12/28/2023 11:56 AM       No results found for: \"MALBCRT\", \"MICROALBUR\"    Physical Examination:  Vital signs: /84   Pulse (!) 51  There is no height or weight on file to calculate BMI.    Assessment and Plan:    1. DM2  Discussed Goals: FBG 80 - 130, 2hPP < 180, a1c < 7.0%  Last a1c drawn today was 7.9%, which is not at goal  Increased from prior A1c on 2/29/24 6.8%  Patient interested in CGM to help monitor BG's, states will implement lifestyle changes rather than change medications at this time. Will consider adjustment to insulin at future visits.     - Medication changes:  No changes   - Continue:  Farxiga 10 mg daily  Metformin XR 2 tablets PO BID   Lantus 15 units daily    - Lifestyle changes:  Exercise Goal - continue current exercise regimen  Dietary Goal - increase veggies/lean protein    - Preventative management:  REC DM Score: n/a  Care gaps addressed: none  Care gaps updated in Health Maintenance    Follow Up:  1 months    Tita Chandra, PharmD    CC:   Trent Doll M.D.    "

## 2024-06-20 ENCOUNTER — OFFICE VISIT (OUTPATIENT)
Dept: CARDIOLOGY | Facility: MEDICAL CENTER | Age: 69
End: 2024-06-20
Attending: INTERNAL MEDICINE
Payer: MEDICARE

## 2024-06-20 VITALS
OXYGEN SATURATION: 94 % | WEIGHT: 233 LBS | HEIGHT: 69 IN | DIASTOLIC BLOOD PRESSURE: 70 MMHG | SYSTOLIC BLOOD PRESSURE: 106 MMHG | BODY MASS INDEX: 34.51 KG/M2 | RESPIRATION RATE: 16 BRPM | HEART RATE: 54 BPM

## 2024-06-20 DIAGNOSIS — I25.118 CORONARY ARTERY DISEASE OF NATIVE ARTERY OF NATIVE HEART WITH STABLE ANGINA PECTORIS (HCC): ICD-10-CM

## 2024-06-20 DIAGNOSIS — E78.00 PURE HYPERCHOLESTEROLEMIA: ICD-10-CM

## 2024-06-20 DIAGNOSIS — I10 PRIMARY HYPERTENSION: ICD-10-CM

## 2024-06-20 DIAGNOSIS — Z95.1 HX OF CABG: ICD-10-CM

## 2024-06-20 DIAGNOSIS — I65.23 BILATERAL CAROTID ARTERY STENOSIS: ICD-10-CM

## 2024-06-20 PROCEDURE — 3074F SYST BP LT 130 MM HG: CPT | Performed by: INTERNAL MEDICINE

## 2024-06-20 PROCEDURE — 3078F DIAST BP <80 MM HG: CPT | Performed by: INTERNAL MEDICINE

## 2024-06-20 PROCEDURE — 99214 OFFICE O/P EST MOD 30 MIN: CPT | Performed by: INTERNAL MEDICINE

## 2024-06-20 PROCEDURE — 99213 OFFICE O/P EST LOW 20 MIN: CPT | Performed by: INTERNAL MEDICINE

## 2024-06-20 PROCEDURE — G2211 COMPLEX E/M VISIT ADD ON: HCPCS | Performed by: INTERNAL MEDICINE

## 2024-06-20 ASSESSMENT — ENCOUNTER SYMPTOMS
NEUROLOGICAL NEGATIVE: 1
MYALGIAS: 0
DOUBLE VISION: 0
FEVER: 0
NERVOUS/ANXIOUS: 0
WEIGHT LOSS: 0
CHILLS: 0
WEAKNESS: 0
DIZZINESS: 0
ABDOMINAL PAIN: 0
DEPRESSION: 0
NAUSEA: 0
COUGH: 0
BRUISES/BLEEDS EASILY: 0
EYES NEGATIVE: 1
CONSTITUTIONAL NEGATIVE: 1
MUSCULOSKELETAL NEGATIVE: 1
CLAUDICATION: 0
PALPITATIONS: 0
HEADACHES: 0
RESPIRATORY NEGATIVE: 1
FOCAL WEAKNESS: 0
CARDIOVASCULAR NEGATIVE: 1
VOMITING: 0
PSYCHIATRIC NEGATIVE: 1
GASTROINTESTINAL NEGATIVE: 1
SHORTNESS OF BREATH: 0
BLURRED VISION: 0

## 2024-06-20 ASSESSMENT — FIBROSIS 4 INDEX: FIB4 SCORE: 1.64

## 2024-07-08 ENCOUNTER — PATIENT MESSAGE (OUTPATIENT)
Dept: CARDIOLOGY | Facility: MEDICAL CENTER | Age: 69
End: 2024-07-08
Payer: MEDICARE

## 2024-07-11 ENCOUNTER — NON-PROVIDER VISIT (OUTPATIENT)
Dept: VASCULAR LAB | Facility: MEDICAL CENTER | Age: 69
End: 2024-07-11
Attending: INTERNAL MEDICINE
Payer: MEDICARE

## 2024-07-11 DIAGNOSIS — E11.65 TYPE 2 DIABETES MELLITUS WITH HYPERGLYCEMIA, WITH LONG-TERM CURRENT USE OF INSULIN (HCC): Primary | ICD-10-CM

## 2024-07-11 DIAGNOSIS — Z79.4 TYPE 2 DIABETES MELLITUS WITH HYPERGLYCEMIA, WITH LONG-TERM CURRENT USE OF INSULIN (HCC): Primary | ICD-10-CM

## 2024-07-11 PROCEDURE — 99212 OFFICE O/P EST SF 10 MIN: CPT

## 2024-07-11 RX ORDER — ACYCLOVIR 400 MG/1
TABLET ORAL
Qty: 9 EACH | Refills: 3 | Status: SHIPPED | OUTPATIENT
Start: 2024-07-11

## 2024-07-16 ENCOUNTER — TELEPHONE (OUTPATIENT)
Dept: CARDIOLOGY | Facility: MEDICAL CENTER | Age: 69
End: 2024-07-16
Payer: MEDICARE

## 2024-08-21 NOTE — PROGRESS NOTES
Patient Consult Note    Primary care physician: Trent Doll M.D.    Reason for consult: Management of Uncontrolled Type 2 Diabetes    HPI:  Yadiel Moody is a 68 y.o. old patient who comes in today for evaluation of above stated problem.    Allergies  Trulicity [dulaglutide]    Current Diabetes Medication Regimen  Metformin ER: 1000 mg BID  SGLT-2 Inhibitor: dapagliflozin (Farxiga) 10 mg daily  Basal Insulin: Lantus 18-20 units sq qhs    Previous Diabetes Medications and Reason for Discontinuation  Trulicity - due to pancreas pain   Ozempic - decided against starting due to concern for necrotizing pancreatitis     Potential Barriers to Care:  Adherence: denies missed doses  Side effects: none  Affordability: no issue  Others: reports pancreas pain despite not being on GLP1a. Pt will f/u with GI/PCP.    SMBG  Pt has home glucometer and proper testing technique - yes  Discussed BG Goals: FBG 80 - 130, 2hPP < 180, A1c < 7.0%    Pt reports blood sugars: Limited today, patient also reports that he was on vacation to [place] and was eating out more   Before Breakfast: 214, 180,   Bedtime: 267, 337      Hypoglycemia  Hypoglycemia awareness: No   Nocturnal hypoglycemia: None  Hypoglycemia:  None  Pt's treatment of Hypoglycemia  Discussed 15:15 Rule    Lifestyle  Current Exercise - Normally has , goes 3-5 days a week. Works out on his own doing weights/cardio. Has not been going with his  for the last 6 weeks, anticipates that he will re-start exercising with his  shortly. Also wanting to get back into bicycle racing, likely to be a longer-term goal.     Dietary - Struggles to eat healthier when he is on the road, tries to limit carbohydrate intake. Sometimes has a cheat meal in the evening and notices his blood sugars increase. Anticipates that he will be home more regularly in the future and that he will be eating healthier.     Labs  Lab Results   Component Value Date/Time    HBA1C 7.9 (A)  "06/12/2024 12:00 AM      Lab Results   Component Value Date/Time    SODIUM 127 (L) 12/28/2023 11:56 AM    POTASSIUM 5.0 12/28/2023 11:56 AM    CHLORIDE 92 (L) 12/28/2023 11:56 AM    CO2 24 12/28/2023 11:56 AM    GLUCOSE 113 (H) 12/28/2023 11:56 AM    BUN 16 12/28/2023 11:56 AM    CREATININE 0.78 12/28/2023 11:56 AM     Lab Results   Component Value Date/Time    ALKPHOSPHAT 129 (H) 12/28/2023 11:56 AM    ASTSGOT 19 12/28/2023 11:56 AM    ALTSGPT 17 12/28/2023 11:56 AM    TBILIRUBIN 0.4 12/28/2023 11:56 AM    INR 1.30 (H) 09/15/2023 02:10 PM    ALBUMIN 4.5 12/28/2023 11:56 AM      Lab Results   Component Value Date/Time    CHOLSTRLTOT 129 12/28/2023 11:56 AM    LDL 56 12/28/2023 11:56 AM    HDL 45 12/28/2023 11:56 AM    TRIGLYCERIDE 141 12/28/2023 11:56 AM       No results found for: \"MALBCRT\", \"MICROALBUR\"    Physical Examination:  Vital signs: There were no vitals taken for this visit. There is no height or weight on file to calculate BMI.    Assessment and Plan:    1. DM2  Discussed Goals: FBG 80 - 130, 2hPP < 180, a1c < 7.0%  Last a1c drawn was 7.9%, which is not at goal  Increased from prior A1c on 2/29/24 6.8%  FBG and HS readings elevated  Patient reports he was on vacation in [place] since last visit, was unable to get metformin for a few days, ran out of [facility] today.   Reports he has been taking 19 or 20 units of lantus QHS due to fear of running out of Lantus between visits and has therefore not titrated up as directed at previous appointment.   Patient is interested in re-trial of GLP1 in the future as he believes some of his pain was not pancreatitis but instead was diverticulosis.   Assisted patient with setting up his Dexcom G7 at this appointment. Pt wanted to wait until his clinic visit before setting up and using the device.     - Medication changes:  Increase Lantus to 20 units daily. Increase by 2 units every 3 days until FBG is less than 130 (max 40 units/day). Instructed patient to increase to no " more than 24 units/day until next appointment.   - Continue:  Farxiga 10 mg daily  Metformin XR 2 tablets PO BID     - Lifestyle changes:  Exercise Goal - continue current exercise regimen, resume exercising with  regularly.  Dietary Goal - increase veggies/lean protein    - Preventative management:  REC DM Score: n/a  Care gaps addressed: none  Care gaps updated in Health Maintenance    Follow Up:  1 months - Check A1c at follow-up.     Lexx Santa, PharmD  Abigail Rasmussen, PharmD    CC:   Dr. Willingham

## 2024-08-22 ENCOUNTER — NON-PROVIDER VISIT (OUTPATIENT)
Dept: VASCULAR LAB | Facility: MEDICAL CENTER | Age: 69
End: 2024-08-22
Attending: INTERNAL MEDICINE
Payer: MEDICARE

## 2024-08-22 DIAGNOSIS — E11.9 TYPE 2 DIABETES MELLITUS WITHOUT COMPLICATION, WITHOUT LONG-TERM CURRENT USE OF INSULIN (HCC): ICD-10-CM

## 2024-08-22 PROCEDURE — 99212 OFFICE O/P EST SF 10 MIN: CPT

## 2024-08-22 RX ORDER — INSULIN GLARGINE 100 [IU]/ML
20-25 INJECTION, SOLUTION SUBCUTANEOUS EVERY EVENING
Qty: 25 ML | Refills: 1 | Status: SHIPPED | OUTPATIENT
Start: 2024-08-22

## 2024-08-27 ENCOUNTER — DOCUMENTATION (OUTPATIENT)
Dept: VASCULAR LAB | Facility: MEDICAL CENTER | Age: 69
End: 2024-08-27
Payer: MEDICARE

## 2024-08-27 NOTE — PROGRESS NOTES
Received correspondence from [facility] that Medicare denied test strips and requires more documentation. Faxed most recent pharmacotherapy note to Medicare billing Exceptions @ [phone]. Sent to [place] to scan in media    Abigail Rasmussen, ShaniquaD

## 2024-09-26 ENCOUNTER — NON-PROVIDER VISIT (OUTPATIENT)
Dept: VASCULAR LAB | Facility: MEDICAL CENTER | Age: 69
End: 2024-09-26
Attending: INTERNAL MEDICINE
Payer: MEDICARE

## 2024-09-26 DIAGNOSIS — E11.9 TYPE 2 DIABETES MELLITUS WITHOUT COMPLICATION, WITHOUT LONG-TERM CURRENT USE OF INSULIN (HCC): ICD-10-CM

## 2024-09-26 LAB
HBA1C MFR BLD: 8.2 % (ref ?–5.8)
POCT INT CON NEG: NEGATIVE
POCT INT CON POS: POSITIVE

## 2024-09-26 PROCEDURE — 99212 OFFICE O/P EST SF 10 MIN: CPT

## 2024-09-26 PROCEDURE — 83036 HEMOGLOBIN GLYCOSYLATED A1C: CPT

## 2024-09-26 RX ORDER — DULAGLUTIDE 0.75 MG/.5ML
1 INJECTION, SOLUTION SUBCUTANEOUS
Qty: 2 ML | Refills: 2 | Status: SHIPPED | OUTPATIENT
Start: 2024-09-26 | End: 2024-10-24

## 2024-09-26 NOTE — PATIENT INSTRUCTIONS
Insulin Dosing Instructions:  Goal Fasting Range:   IF 3 days in a row >130 then increase insulin dose by 2 units.  IF ANY readings <80 the decrease insulin dose by 2 units.    Once able to initiate Trulicity, decrease insulin dose to 20 units, wait 3 days and then adjust according to directions above.

## 2024-09-26 NOTE — PROGRESS NOTES
Patient Consult Note    Primary care physician: Trent Doll M.D.    Reason for consult: Management of Uncontrolled Type 2 Diabetes    HPI:  Yadiel Moody is a 68 y.o. old patient who comes in today for evaluation of above stated problem.    Allergies  Trulicity [dulaglutide]    Current Diabetes Medication Regimen  Metformin ER: 1000 mg BID  SGLT-2 Inhibitor: dapagliflozin (Farxiga) 10 mg daily  Basal Insulin: Lantus 24 units sq qhs    Previous Diabetes Medications and Reason for Discontinuation  Trulicity - due to pancreas pain?   Pt believes that it may be diverticulosis associated pain instead, reports that he was recently hospitalized at [facility] of 2024 for what he thought was pancreatic pain, but imaging/diagnostics was negative for pancreatic involvement, instead he states he was diagnosed with diverticulosis.   Ozempic - decided against starting due to concern for necrotizing pancreatitis     Potential Barriers to Care:  Adherence: denies missed doses  Side effects: none  Affordability: no issue  Others: reports pancreas pain despite not being on GLP1a. Pt will f/u with GI/PCP.    SMBG  Pt has home glucometer and proper testing technique - yes  Discussed BG Goals: FBG 80 - 130, 2hPP < 180, A1c < 7.0%    Interrogation of CGM data results below:   7-days 14-days 30-days   TIR 71% 66% 52%   Hyperglycemia 28% 33% 47%   Hypoglycemia 1% 1% 1%   Ave Glucose 161 169 192   Sensor Active Time 86% 86% 93%     Hypoglycemia  Hypoglycemia awareness: No   Nocturnal hypoglycemia: None  Hypoglycemia:  None  Pt's treatment of Hypoglycemia  Discussed 15:15 Rule    Lifestyle  Current Exercise - Normally has , goes 3-5 days a week. Works out on his own doing weights/cardio. Has not been going with his  for the last 6 weeks, anticipates that he will re-start exercising with his  shortly. Also wanting to get back into bicycle racing, likely to be a longer-term goal.     Dietary - Struggles  "to eat healthier when he is on the road, tries to limit carbohydrate intake. Sometimes has a cheat meal in the evening and notices his blood sugars increase. Anticipates that he will be home more regularly in the future and that he will be eating healthier.     Labs  Lab Results   Component Value Date/Time    HBA1C 8.2 (A) 09/26/2024 12:00 AM      Lab Results   Component Value Date/Time    SODIUM 127 (L) 12/28/2023 11:56 AM    POTASSIUM 5.0 12/28/2023 11:56 AM    CHLORIDE 92 (L) 12/28/2023 11:56 AM    CO2 24 12/28/2023 11:56 AM    GLUCOSE 113 (H) 12/28/2023 11:56 AM    BUN 16 12/28/2023 11:56 AM    CREATININE 0.78 12/28/2023 11:56 AM     Lab Results   Component Value Date/Time    ALKPHOSPHAT 129 (H) 12/28/2023 11:56 AM    ASTSGOT 19 12/28/2023 11:56 AM    ALTSGPT 17 12/28/2023 11:56 AM    TBILIRUBIN 0.4 12/28/2023 11:56 AM    INR 1.30 (H) 09/15/2023 02:10 PM    ALBUMIN 4.5 12/28/2023 11:56 AM      Lab Results   Component Value Date/Time    CHOLSTRLTOT 129 12/28/2023 11:56 AM    LDL 56 12/28/2023 11:56 AM    HDL 45 12/28/2023 11:56 AM    TRIGLYCERIDE 141 12/28/2023 11:56 AM       No results found for: \"MALBCRT\", \"MICROALBUR\"    Physical Examination:  Vital signs: There were no vitals taken for this visit. There is no height or weight on file to calculate BMI.    Assessment and Plan:    1. DM2  Discussed Goals: FBG 80 - 130, 2hPP < 180, a1c < 7.0%  Last a1c drawn today was 8.2%, which is not at goal  Increased from prior A1c on 6/12/24 7.9%  CGM data demonstrates trend toward improvement for TIR.   Reports he was able to uptitrate Lantus to 24 units as directed at previous appointment without any hypoglycemic episodes.  Pt reports he was hospitalized at [facility] ~1.5 months ago for what he thought was pancreatic pain, but all imaging/diagnostics was negative for pancreatic involvement, reports that he was diagnosed with diverticulosis.   Patient is interested in re-trial of GLP1 today due to believing his " diagnosis of diverticulosis is what caused the pain which led to discontinuing Trulicity in the past.   Pt reports that he has been gaining weight as a result of increased insulin use and that he wants to re-try Trulicity to help reduce insulin requirement and potentially see some weight loss benefits from GLP-1.     - Medication changes:  INITIATE Trulicity 0.75 mg SQ weekly   Advised patient low threshold to discontinue medication if he experiences adverse effects again considering PMH. Pt aware.   Decrease Lantus to 20 units daily once able to initiate Trulicity. Pt to then titrate insulin dose based on FBG values:  Insulin Dosing Instructions:  Goal Fasting Range:   IF 3 days in a row >130 then increase insulin dose by 2 units.  IF ANY readings <80 then decrease insulin dose by 2 units.  Typed Insulin Dosing Instructions provided to patient on AVS.   - Continue:  Farxiga 10 mg daily  Metformin XR 1000mg PO BID     - Lifestyle changes:  Exercise Goal - continue current exercise regimen and increase as tolerated.  Dietary Goal - increase veggies/lean protein.    - Preventative management:  REC DM Score: n/a  Care gaps addressed: none  Care gaps updated in Health Maintenance    Follow Up:  1 months - GLP1 tolerability and insulin titration.    Lexx Santa, PharmD    CC:   Dr. Maulik Pickett, PharmD

## 2024-10-31 ENCOUNTER — NON-PROVIDER VISIT (OUTPATIENT)
Dept: VASCULAR LAB | Facility: MEDICAL CENTER | Age: 69
End: 2024-10-31
Attending: INTERNAL MEDICINE
Payer: MEDICARE

## 2024-10-31 DIAGNOSIS — E11.9 TYPE 2 DIABETES MELLITUS WITHOUT COMPLICATION, WITHOUT LONG-TERM CURRENT USE OF INSULIN (HCC): ICD-10-CM

## 2024-10-31 PROCEDURE — 99212 OFFICE O/P EST SF 10 MIN: CPT

## 2024-11-11 DIAGNOSIS — E11.65 TYPE 2 DIABETES MELLITUS WITH HYPERGLYCEMIA, WITH LONG-TERM CURRENT USE OF INSULIN (HCC): ICD-10-CM

## 2024-11-11 DIAGNOSIS — Z79.4 TYPE 2 DIABETES MELLITUS WITH HYPERGLYCEMIA, WITH LONG-TERM CURRENT USE OF INSULIN (HCC): ICD-10-CM

## 2024-11-11 DIAGNOSIS — E11.9 TYPE 2 DIABETES MELLITUS WITHOUT COMPLICATION, WITHOUT LONG-TERM CURRENT USE OF INSULIN (HCC): ICD-10-CM

## 2024-11-13 RX ORDER — METFORMIN HYDROCHLORIDE 500 MG/1
1000 TABLET, EXTENDED RELEASE ORAL 2 TIMES DAILY
Qty: 400 TABLET | Refills: 1 | Status: SHIPPED | OUTPATIENT
Start: 2024-11-13

## 2024-12-05 ENCOUNTER — NON-PROVIDER VISIT (OUTPATIENT)
Dept: VASCULAR LAB | Facility: MEDICAL CENTER | Age: 69
End: 2024-12-05
Attending: INTERNAL MEDICINE
Payer: MEDICARE

## 2024-12-05 ENCOUNTER — TELEPHONE (OUTPATIENT)
Dept: VASCULAR LAB | Facility: MEDICAL CENTER | Age: 69
End: 2024-12-05

## 2024-12-05 DIAGNOSIS — Z79.4 TYPE 2 DIABETES MELLITUS WITH HYPERGLYCEMIA, WITH LONG-TERM CURRENT USE OF INSULIN (HCC): Primary | ICD-10-CM

## 2024-12-05 DIAGNOSIS — E11.65 TYPE 2 DIABETES MELLITUS WITH HYPERGLYCEMIA, WITH LONG-TERM CURRENT USE OF INSULIN (HCC): Primary | ICD-10-CM

## 2024-12-05 PROCEDURE — 99212 OFFICE O/P EST SF 10 MIN: CPT

## 2024-12-05 RX ORDER — DULAGLUTIDE 3 MG/.5ML
3 INJECTION, SOLUTION SUBCUTANEOUS
Qty: 2 ML | Refills: 3 | Status: SHIPPED | OUTPATIENT
Start: 2024-12-05

## 2024-12-05 NOTE — PATIENT INSTRUCTIONS
Goal a1c is less than 7.0%  Goal fasting sugars   Goal sugar 2 hours after meal: less than 180    Continue metformin 1000 mg twice daily  Continue Farxiga 10 mg daily  Inject Trulicity 3 mg weekly once you finish your supply for the 1.5 mg injections  Inject Lantus 20 units every evening. Decrease by 2 units every 3 days if blood sugar in the morning is consistently less than 80.    Please call our clinic if you have any concerns [phone]

## 2024-12-05 NOTE — TELEPHONE ENCOUNTER
Received New start PA request via [employer]  for   Dulaglutide (TRULICITY) 3 MG/0.5ML Solution Auto-injector. (Quantity:2 mls, Day Supply:28)     Insurance: [employer] [facility]   Member ID:  [id number]  BIN: [id number]  PCN: [id number]  Group: [id number]     Ran Test claim via [place] & medication  pays $0 copay. Pt ineligible to fill through [facility] due to pt's home address. Will release Rx to [id number] [place] [address]    SHANE Dent, PhT  Vascular Pharmacy Liaison (Rx Coordinator)  P: [phone]  12/5/2024 10:38 AM

## 2024-12-05 NOTE — PROGRESS NOTES
Patient Consult Note    Primary care physician: Trent Doll M.D.    Reason for consult: Management of Uncontrolled Type 2 Diabetes    HPI:  Yadiel Moody is a 68 y.o. old patient who comes in today for evaluation of above stated problem.    Allergies  Trulicity    Current Diabetes Medication Regimen  Metformin ER: 1000 mg BID  GLP-1 or GLP-1/GIP Agent: dulaglutide (Trulicity) 1.5 mg weekly -- injected 3 doses so far  SGLT-2 Inhibitor: dapagliflozin (Farxiga) 10 mg daily  Basal Insulin: Lantus 18-20 units daily    Previous Diabetes Medications and Reason for Discontinuation  Trulicity - previously d/c as it was thought to cause pancreatic pain, however pt was hospitalized at [facility] summer of 2024 for what he thought was pancreatic pain, but imaging/diagnostics was negative for pancreatic involvement, instead he states he was diagnosed with diverticulosis.     Potential Barriers to Care:  Adherence: denies missed doses  Side effects: none, no stomach upset or pain from restarting Trulicity   Affordability: yes    SMBG  Pt has home glucometer and proper testing technique - yes        Hypoglycemia  Hypoglycemia awareness: Yes  Nocturnal hypoglycemia: None  Hypoglycemia:  None  Pt's treatment of Hypoglycemia  Discussed 15:15 Rule    Lifestyle  Current Exercise - has not gone back to the gym, planning to return soon to work with his     Dietary: variable at this time  Breakfast: egg, ham, cheese, avocado, occasional high fiber bread  Lunch: sometimes skips; high fiber bread with peanut butter, fruit/cottage cheese  Dinner: hamburger, baked beans  Snacks: cheese, fruit (apples, raspberries), popcorn, occasional 1/2 to 1 cup of ice cream  Drinks: coffee (with occasional artificial sweetener), water    Labs  Lab Results   Component Value Date/Time    HBA1C 8.2 (A) 09/26/2024 12:00 AM    HBA1C 7.9 (A) 06/12/2024 12:00 AM    HBA1C 6.8 (A) 02/29/2024 12:00 AM      Lab Results   Component Value Date/Time  "   SODIUM 127 (L) 12/28/2023 11:56 AM    POTASSIUM 5.0 12/28/2023 11:56 AM    CHLORIDE 92 (L) 12/28/2023 11:56 AM    CO2 24 12/28/2023 11:56 AM    GLUCOSE 113 (H) 12/28/2023 11:56 AM    BUN 16 12/28/2023 11:56 AM    CREATININE 0.78 12/28/2023 11:56 AM     Lab Results   Component Value Date/Time    ALKPHOSPHAT 129 (H) 12/28/2023 11:56 AM    ASTSGOT 19 12/28/2023 11:56 AM    ALTSGPT 17 12/28/2023 11:56 AM    TBILIRUBIN 0.4 12/28/2023 11:56 AM    INR 1.30 (H) 09/15/2023 02:10 PM    ALBUMIN 4.5 12/28/2023 11:56 AM      Lab Results   Component Value Date/Time    CHOLSTRLTOT 129 12/28/2023 11:56 AM    LDL 56 12/28/2023 11:56 AM    HDL 45 12/28/2023 11:56 AM    TRIGLYCERIDE 141 12/28/2023 11:56 AM       No results found for: \"MALBCRT\", \"MICROALBUR\"    Physical Examination:  Vital signs: There were no vitals taken for this visit. There is no height or weight on file to calculate BMI.    Assessment and Plan:    1. DM2  Discussed Goals: FBG 80 - 130, 2hPP < 180, a1c < 7.0%  Last a1c drawn on 9/26/2024 was 8.2%, which is not at goal and has worsened since the previous reading  CGM data reports 51% TIR  Decline in glucose control appears to be secondary to non-adherence to previous lifestyle modifications (diet was variable due to recent vacations and pt is not going to the gym at this time). Encouraged to resume previous lifestyle.  Patient is tolerating higher dose of Trulicity. He would benefit from dose increase for additional BG control.  Reasonable to continue Lantus 20 units daily based on FBG from CGM report. However, may have to taper down as effect of higher dose of Trulicity is observed.     - Medication changes:  Increase Trulicity to 3 mg weekly  - Continue:  Lantus 20 units daily (with possible decrease if needed, patient informed of decreasing by 2 units every 3 days if morning glucose is below 80)  Farxiga 10 mg daily  Metformin ER 1000 mg BID    - Lifestyle changes:  Exercise Goal - resume with  when " return from vacation  Dietary Goal - diet awareness while on vacation    - Preventative management:  REC DM Score: N/A  Care gaps addressed:   A1c is above 8%: increased Trulicity dose  Care gaps updated in Health Maintenance    Follow Up:  4 weeks, A1c due    Reba George, PharmD    CC:   Trent Doll M.D.  Jaxon Willingham M.D.

## 2024-12-28 DIAGNOSIS — E11.9 TYPE 2 DIABETES MELLITUS WITHOUT COMPLICATION, WITHOUT LONG-TERM CURRENT USE OF INSULIN (HCC): ICD-10-CM

## 2024-12-28 DIAGNOSIS — Z79.4 TYPE 2 DIABETES MELLITUS WITH HYPERGLYCEMIA, WITH LONG-TERM CURRENT USE OF INSULIN (HCC): ICD-10-CM

## 2024-12-28 DIAGNOSIS — E11.65 TYPE 2 DIABETES MELLITUS WITH HYPERGLYCEMIA, WITH LONG-TERM CURRENT USE OF INSULIN (HCC): ICD-10-CM

## 2024-12-31 RX ORDER — DAPAGLIFLOZIN 10 MG/1
10 TABLET, FILM COATED ORAL
Qty: 100 TABLET | Refills: 1 | Status: SHIPPED | OUTPATIENT
Start: 2024-12-31

## 2025-01-03 ENCOUNTER — NON-PROVIDER VISIT (OUTPATIENT)
Dept: VASCULAR LAB | Facility: MEDICAL CENTER | Age: 70
End: 2025-01-03
Attending: INTERNAL MEDICINE
Payer: MEDICARE

## 2025-01-03 VITALS — SYSTOLIC BLOOD PRESSURE: 127 MMHG | HEART RATE: 76 BPM | DIASTOLIC BLOOD PRESSURE: 92 MMHG

## 2025-01-03 DIAGNOSIS — E11.65 TYPE 2 DIABETES MELLITUS WITH HYPERGLYCEMIA, WITH LONG-TERM CURRENT USE OF INSULIN (HCC): ICD-10-CM

## 2025-01-03 DIAGNOSIS — Z79.4 TYPE 2 DIABETES MELLITUS WITH HYPERGLYCEMIA, WITH LONG-TERM CURRENT USE OF INSULIN (HCC): ICD-10-CM

## 2025-01-03 LAB
HBA1C MFR BLD: 8 % (ref ?–5.8)
POCT INT CON NEG: NEGATIVE
POCT INT CON POS: POSITIVE

## 2025-01-03 PROCEDURE — 99212 OFFICE O/P EST SF 10 MIN: CPT

## 2025-01-03 PROCEDURE — 83036 HEMOGLOBIN GLYCOSYLATED A1C: CPT

## 2025-01-03 RX ORDER — DULAGLUTIDE 4.5 MG/.5ML
4.5 INJECTION, SOLUTION SUBCUTANEOUS
Qty: 6 ML | Refills: 3 | Status: SHIPPED | OUTPATIENT
Start: 2025-01-03

## 2025-01-03 NOTE — PROGRESS NOTES
Patient Consult Note    Primary care physician: Trent Doll M.D.    Reason for consult: Management of Uncontrolled Type 2 Diabetes    HPI:  Yadiel Moody is a 68 y.o. old patient who comes in today for evaluation of above stated problem.    Allergies  None    Current Diabetes Medication Regimen  Metformin ER: 1000 mg BID  GLP-1 or GLP-1/GIP Agent: dulaglutide (Trulicity) 3 mg weekly -- administered 3 injections  SGLT-2 Inhibitor: dapagliflozin (Farxiga) 10 mg daily  Basal Insulin: Lantus 20 units daily    Previous Diabetes Medications and Reason for Discontinuation  Trulicity - previously d/c as it was thought to cause pancreatic pain, however pt was hospitalized at [facility] of 2024 for what he thought was pancreatic pain, but imaging/diagnostics was negative for pancreatic involvement, instead he states he was diagnosed with diverticulosis.     Potential Barriers to Care:  Adherence: reports missed doses (1-2 doses of Farxiga)  Side effects: none, no stomach upset or pain from restarting Trulicity   Affordability: no issues    SMBG  Pt has home glucometer and proper testing technique - Dexcom    Patient had an issue with dexcom sensors, was using a manual meter but does not remember numbers    Hypoglycemia  Hypoglycemia awareness: Yes  Nocturnal hypoglycemia: None  Hypoglycemia:  None  Pt's treatment of Hypoglycemia  Discussed 15:15 Rule    Lifestyle  Current Exercise - has not gone back to the gym, planning to return soon to work with his     Dietary: eating worse with holidays  Breakfast: eggs  Lunch: oats, high fiber bread with peanut butter, fruit/cottage cheese  Dinner: broccoli, brown rice, chicken breast  Snacks: cheese, fruit (apples, raspberries), popcorn, occasional 1/2 to 1 cup of ice cream  Drinks: coffee (with occasional artificial sweetener), water    Labs  Lab Results   Component Value Date/Time    HBA1C 8.0 (A) 01/03/2025 10:12 AM    HBA1C 8.2 (A) 09/26/2024 12:00 AM     "HBA1C 7.9 (A) 06/12/2024 12:00 AM      Lab Results   Component Value Date/Time    SODIUM 127 (L) 12/28/2023 11:56 AM    POTASSIUM 5.0 12/28/2023 11:56 AM    CHLORIDE 92 (L) 12/28/2023 11:56 AM    CO2 24 12/28/2023 11:56 AM    GLUCOSE 113 (H) 12/28/2023 11:56 AM    BUN 16 12/28/2023 11:56 AM    CREATININE 0.78 12/28/2023 11:56 AM     Lab Results   Component Value Date/Time    ALKPHOSPHAT 129 (H) 12/28/2023 11:56 AM    ASTSGOT 19 12/28/2023 11:56 AM    ALTSGPT 17 12/28/2023 11:56 AM    TBILIRUBIN 0.4 12/28/2023 11:56 AM    INR 1.30 (H) 09/15/2023 02:10 PM    ALBUMIN 4.5 12/28/2023 11:56 AM      Lab Results   Component Value Date/Time    CHOLSTRLTOT 129 12/28/2023 11:56 AM    LDL 56 12/28/2023 11:56 AM    HDL 45 12/28/2023 11:56 AM    TRIGLYCERIDE 141 12/28/2023 11:56 AM       No results found for: \"MALBCRT\", \"MICROALBUR\"    Physical Examination:  Vital signs: BP (!) 127/92   Pulse 76  There is no height or weight on file to calculate BMI.    Assessment and Plan:    1. DM2  Discussed Goals: FBG 80 - 130, 2hPP < 180, a1c < 7.0%  Last a1c drawn today was 8% which is not at goal and has improved since the previous reading(8.2% on 9/26/24)  Unable to see more than 2 days trend on CGM, from trend mealtime blood sugars appear to be higher.   Patient has not changed activity level - states diet worse with holidays. States usual meals are healthier.   Patient is tolerating Trulicity. He would benefit from dose increase for additional BG control.  Reasonable to continue Lantus 20 units daily based on FBG from CGM report. However, may taper down as effect of higher dose of Trulicity is observed if warranted.     - Medication changes:  Increase Trulicity to 4.5 mg weekly  - Continue:  Lantus 20 units daily   Farxiga 10 mg daily  Metformin ER 1000 mg BID    - Lifestyle changes:  Exercise Goal - Will start exercise next week on Monday. Previously endurance cycling/going to gym.   Dietary Goal - Don't eat past 7 pm    - " Preventative management:  REC DM Score: N/A  Care gaps addressed:   A1c is above 8%: increased Trulicity dose  Care gaps updated in Health Maintenance    Follow Up:  6 weeks    Tita Chandra, PharmD    CC:   Trent Doll M.D.

## 2025-01-16 NOTE — PROGRESS NOTES
Ely Cantrell  tolerated Venofer therapy well with no complications.      Ely Cantrell is aware of future appt on 2/3/25 @ 1030.     AVS printed and given to Ely Cantrell.       Neurology Clinic - Follow-up Note    Chief complaint: memory decline          Interval History:  Patient overrall doing well although has noticed mild worsening of short term memory difficulties and remember long rote paragraphs, something he could easily do years ago. Tolerating Namenda. Thinks it has helped. Has chronic stress. Sleep is better especially with new CPAP machine. Diet is poor; gained weight in past 3  Months. RTC 3 months.       Labs reviewed. Discussed High cholesteral.     ROS: Pertinent positives and negatives are as documented above          Current medications:     Current Outpatient Medications   Medication Instructions   • ascorbic acid (ASCORBIC ACID) 500 mg, Oral, DAILY   • aspirin (ASA) 81 mg, Oral, EVERY 6 HOURS PRN   • cetirizine (ZYRTEC) 10 mg, Oral, DAILY   • ezetimibe (ZETIA) 10 MG Tab TK 1 T PO QD   • fluticasone (FLONASE) 50 MCG/ACT nasal spray No dose, route, or frequency recorded.   • lamoTRIgine (LAMICTAL) 25 mg, Oral, DAILY   • lisinopril (PRINIVIL) 40 mg, Oral, DAILY   • magnesium citrate Solution 300 mL, Oral, ONCE   • memantine (NAMENDA) 10 mg, Oral, 2 TIMES DAILY   • niacin 1,000 mg, Oral, DAILY   • omeprazole (PRILOSEC) 20 mg, Oral, DAILY   • Potassium (POTASSIMIN PO) Oral, Takes daily    • therapeutic multivitamin-minerals (THERAGRAN-M) Tab 1 Tab, Oral, DAILY   • vitamin D (CHOLECALCIFEROL) 4,000 Units, Oral, DAILY           Physical examination:   Vitals:    01/27/21 0958   BP: 134/74   Pulse: 78   Temp: 36.7 °C (98.1 °F)   SpO2: 96%     Vitals:    01/27/21 0958   BP: 134/74   Pulse: 78   Temp: 36.7 °C (98.1 °F)   SpO2: 96%       General: Patient in no acute distress, pleasant and cooperative.  HEENT: Normocephalic, no signs of acute trauma.   Neck: visibly supple, with  normal range of motion.   Chest: clear to auscultation. No cough.   CV: RRR, no murmurs.   Skin: no signs of acute rashes or trauma.   Musculoskeletal: Limited range of motion at the ankles  Psychiatric:   Denies symptoms of depression or suicidal ideation. Mood and affect appear normal on exam.      NEUROLOGICAL EXAM:   Mental status, orientation: Awake, alert and fully oriented.   Speech and language: speech is clear and fluent.  Patient is able to articulate her past medical history well and comprehension is grossly intact  Cranial nerve exam: Pupils are 3-4 mm bilaterally. Visual fields are intact by confrontation. There is No nystagmus. Intact full EOM in all directions of gaze. Face appears symmetric.      Motor exam: Strength grossly intact  Sensory exam reveals normal sense of light touch,  Coordination: shows a normal finger-nose-finger.   Gait: Deferred        ANCILLARY DATA REVIEWED:      Lab Data Review:    ASSESSMENT AND PLAN:  Patient with mild cognitive impairment, amnestic type. No clear signs of dementia. Stress may be playing a role. Increasing Namenda as this has been helpful. Ordering Neuropsych testing to get a better handle on his cognitive deficits.  Discussed importance of dealing with high cholesterol, dieting, and getting back into exercising.    Orders Placed This Encounter   • REFERRAL FOR PSYCHOLOGICAL TESTING   • magnesium citrate Solution   • Potassium (POTASSIMIN PO)   • Memantine HCl ER (NAMENDA XR) 21 MG CAPSULE SR 24 HR     Encounter Diagnoses   Name Primary?   • Cognitive decline    • Memory problem    • Obstructive sleep apnea syndrome    • MCI (mild cognitive impairment) with memory loss    • Sleep apnea, unspecified type    • Obesity (BMI 30-39.9)    • Hyperchloremia             Lexx Clarke MD  Epilepsy and General Neurology  Department of Neurology  Instructor of Neurology [facility].   Office: [phone]  Fax: [phone]      BILLING DOCUMENTATION:       Counseling:  I spent a total of 45 minutes of face-to-face time in this visit. Over 50% of the time of the visit today was spent on counseling and or coordination of care [facility] the  patient and/or family, as above in assessment in plan.

## 2025-02-14 ENCOUNTER — NON-PROVIDER VISIT (OUTPATIENT)
Dept: VASCULAR LAB | Facility: MEDICAL CENTER | Age: 70
End: 2025-02-14
Attending: INTERNAL MEDICINE
Payer: MEDICARE

## 2025-02-14 DIAGNOSIS — E11.9 TYPE 2 DIABETES MELLITUS WITHOUT COMPLICATION, WITHOUT LONG-TERM CURRENT USE OF INSULIN (HCC): ICD-10-CM

## 2025-02-14 DIAGNOSIS — Z79.4 TYPE 2 DIABETES MELLITUS WITH HYPERGLYCEMIA, WITH LONG-TERM CURRENT USE OF INSULIN (HCC): ICD-10-CM

## 2025-02-14 DIAGNOSIS — E11.65 TYPE 2 DIABETES MELLITUS WITH HYPERGLYCEMIA, WITH LONG-TERM CURRENT USE OF INSULIN (HCC): ICD-10-CM

## 2025-02-14 PROCEDURE — 99212 OFFICE O/P EST SF 10 MIN: CPT

## 2025-02-14 RX ORDER — INSULIN GLARGINE 100 [IU]/ML
20-25 INJECTION, SOLUTION SUBCUTANEOUS EVERY EVENING
Qty: 25 ML | Refills: 3 | Status: SHIPPED | OUTPATIENT
Start: 2025-02-14

## 2025-02-14 RX ORDER — DAPAGLIFLOZIN 10 MG/1
10 TABLET, FILM COATED ORAL
Qty: 100 TABLET | Refills: 1 | Status: SHIPPED | OUTPATIENT
Start: 2025-02-14

## 2025-02-14 NOTE — PROGRESS NOTES
Patient Consult Note    TIME IN: 9.00 am   TIME OUT: 9.48 am     Primary care physician: Trent Doll M.D.    Reason for Consult: Management of Uncontrolled Type 2 Diabetes    Date Referral Placed: 09/20/2023    HPI:  Yadiel Moody is a 69 y.o. old patient who comes in today for evaluation of above stated problem.    Allergies  Patient has no active allergies.    Current Diabetes Medication Regimen  Metformin ER: 1000 mg BID  GLP-1 or GLP-1/GIP Agent: dulaglutide (Trulicity) 3 mg weekly -- administered 3 injections  SGLT-2 Inhibitor: dapagliflozin (Farxiga) 10 mg daily  Basal Insulin: Lantus 20 units daily    Previous Diabetes Medications and Reason for Discontinuation  Trulicity - previously d/c as it was thought to cause pancreatic pain, however pt was hospitalized at [facility] summer of 2024 for what he thought was pancreatic pain, but imaging/diagnostics was negative for pancreatic involvement, instead he states he was diagnosed with diverticulosis.     Potential Barriers to Care:  Adherence:  Not often but might miss a dose once or twice a week    Side effects: none  Affordability: Yes, meds are affordable    SMBG  Patient has a CGM Dexcom G7     Pt reports blood sugars:   30 day Clarity Data  5% - very High   31% High   64% In range   0% Low   0% Low     30 day glucose summary- 176  7.5% GMI      Hypoglycemia  Hypoglycemia awareness: Yes  Nocturnal hypoglycemia: None  Hypoglycemia:  None  Pt's treatment of Hypoglycemia  Discussed 15:15 Rule    Lifestyle  Current Exercise - 3-4 days a week at a gym     Dietary   Breakfast - Avocado with coffee  Lunch - High fiber bread with peanut butter  Dinner - Protein and Qinuoa and or couscous  Snacks - Fruits  Drinks - Water and coffee    Labs  Lab Results   Component Value Date/Time    HBA1C 8.0 (A) 01/03/2025 10:12 AM    HBA1C 8.2 (A) 09/26/2024 12:00 AM    HBA1C 7.9 (A) 06/12/2024 12:00 AM      Lab Results   Component Value Date/Time    SODIUM 127 (L)  "12/28/2023 11:56 AM    POTASSIUM 5.0 12/28/2023 11:56 AM    CHLORIDE 92 (L) 12/28/2023 11:56 AM    CO2 24 12/28/2023 11:56 AM    GLUCOSE 113 (H) 12/28/2023 11:56 AM    BUN 16 12/28/2023 11:56 AM    CREATININE 0.78 12/28/2023 11:56 AM     Lab Results   Component Value Date/Time    ALKPHOSPHAT 129 (H) 12/28/2023 11:56 AM    ASTSGOT 19 12/28/2023 11:56 AM    ALTSGPT 17 12/28/2023 11:56 AM    TBILIRUBIN 0.4 12/28/2023 11:56 AM    INR 1.30 (H) 09/15/2023 02:10 PM    ALBUMIN 4.5 12/28/2023 11:56 AM      Lab Results   Component Value Date/Time    CHOLSTRLTOT 129 12/28/2023 11:56 AM    LDL 56 12/28/2023 11:56 AM    HDL 45 12/28/2023 11:56 AM    TRIGLYCERIDE 141 12/28/2023 11:56 AM       No results found for: \"MALBCRT\", \"MICROALBUR\"    Physical Examination:  Vital signs: There were no vitals taken for this visit. There is no height or weight on file to calculate BMI.    Assessment and Plan:    1. DM2  Discussed Goals: FBG 80 - 130, 2hPP < 180, a1c < 7.0%  Last a1c drawn on 01/03/2025 was 8.0%, which is not at goal and has improved since the previous reading  At his previous apt, Trulicity was increased from 3 mg to 4.5 mg, he has been on 4.5 mg now for 6 weeks and has not  noticed any changes in his BG or dietary suppression. He states that's his BG have increased since the Trulicity increase and this concerns him. Overall, he tries to eat healthy but recently he has had several formal dinners.  14 day Avg  on G7  Patient has a goal to discontinue basal insulin, we discussed the benefits of dual GLP-1/GIP agonist Mounjaro. He is willing to try it.    - Medication changes:  Stop Trulicity 4.5 mg. Start Mounjaro 7.5 mg once weekly, if tolerated doses will be increased     - Continue:  Lantus 20 U, Metformin 1000 mg twice daily and Farxiga     - Lifestyle changes:  Exercise Goal - Continue gym workouts 3-4 days a week  Dietary Goal - Focus on decreased portion sizes and low carb options    - Preventative management:  REC " DM Score: N/A  Care gaps addressed:   A1c is above 8%: Optimized DM medications/management  Care gaps updated in Health Maintenance    Follow Up:  6 weeks    Yuliana Reynolds, PharmD    CC:   Trent Doll M.D.

## 2025-02-24 ENCOUNTER — OFFICE VISIT (OUTPATIENT)
Dept: SLEEP MEDICINE | Facility: MEDICAL CENTER | Age: 70
End: 2025-02-24
Attending: STUDENT IN AN ORGANIZED HEALTH CARE EDUCATION/TRAINING PROGRAM
Payer: MEDICARE

## 2025-02-24 VITALS
BODY MASS INDEX: 32.29 KG/M2 | RESPIRATION RATE: 16 BRPM | HEART RATE: 78 BPM | WEIGHT: 218 LBS | HEIGHT: 69 IN | SYSTOLIC BLOOD PRESSURE: 112 MMHG | DIASTOLIC BLOOD PRESSURE: 70 MMHG | OXYGEN SATURATION: 91 %

## 2025-02-24 DIAGNOSIS — G47.33 OSA (OBSTRUCTIVE SLEEP APNEA): ICD-10-CM

## 2025-02-24 PROCEDURE — 99213 OFFICE O/P EST LOW 20 MIN: CPT | Performed by: STUDENT IN AN ORGANIZED HEALTH CARE EDUCATION/TRAINING PROGRAM

## 2025-02-24 PROCEDURE — 3078F DIAST BP <80 MM HG: CPT | Performed by: STUDENT IN AN ORGANIZED HEALTH CARE EDUCATION/TRAINING PROGRAM

## 2025-02-24 PROCEDURE — 3074F SYST BP LT 130 MM HG: CPT | Performed by: STUDENT IN AN ORGANIZED HEALTH CARE EDUCATION/TRAINING PROGRAM

## 2025-02-24 ASSESSMENT — FIBROSIS 4 INDEX: FIB4 SCORE: 1.67

## 2025-02-24 NOTE — PROGRESS NOTES
[facility] Follow-up Visit    CC: Yearly follow-up for management of sleep apnea.      HPI:  Yadiel Moody is a 69 y.o.male  with CAD status post CABG, history of obstructive sleep apnea on BiPAP.  Presents today to follow-up regarding management of obstructive sleep apnea.    He continues to use his BiPAP machine nightly.  His machine was replaced regarding the Respironics recall.  He is changing out of supplies regularly.  He does not use his humidifier or heated tube.  He is curious to know when he would be eligible for a new machine.  Otherwise has no acute complaints at this time.    DME provider: Shellie Presley  Device: Dreamstation BiPAP replaced from recall  Mask: fullface   Aerophagia: No   Snoring: No   Dry mouth: No   Leak: No   Skin irritation: No   Chin strap: No      Sleep History  PSG BiPAP titration from 11/26/19 indicated BiPAP was started at 10/6 cm of water and titrated to 16/12 cm of water.  On BIPAP: 14/10 cm of water the resultant AHI was 5 in NREM sleep, however mean oximetry was 89%. On BiPAP: 16/12 cm of water mean oximetry improved to 91% SPO2 in REM sleep     HST on 2/20/19 by an outlying facility which showed severe NARCISO with AHI of 33.5/hr and O2 caryn 62%.     Patient Active Problem List    Diagnosis Date Noted    Hx of CABG 10/17/2023    Bilateral carotid artery stenosis 10/17/2023    NSTEMI (non-ST elevated myocardial infarction) ([facility]) 08/17/2023    Coronary artery disease of native artery with stable angina pectoris ([facility]) 08/17/2023    Major depressive disorder 01/20/2022    Pre-diabetes 10/28/2020    Morbid obesity with BMI of 40.0-44.9, adult ([facility]) 06/26/2019    NARCISO (obstructive sleep apnea) 04/17/2019    Memory problem 11/10/2017    Spells of decreased attentiveness 11/10/2017    Umbilical hernia 02/23/2015    Unstable angina ([facility]) 12/28/2012    Hypertension 12/28/2012    Hyperlipidemia 12/28/2012       Past Medical History:   Diagnosis Date    Allergic rhinitis      Anesthesia     slow to wake up; ?apneic, needs encourangement to breathe    Back pain     Bowel habit changes 09/07/2023    diarrhea at times r/t pancreatitis    Cataract     minimal; NOT surgically corrected    Chickenpox     history of    [place] measles     history of    Heart burn     High cholesterol     medicated    Hx of lithotripsy     Hyperlipidemia     Hypertension     medicated    Influenza     history of    Mumps     history of    Myocardial infarct (HCC) 10/2022    x 3    Nasal drainage     Obesity     Pancreatitis 10/09/2022    Psychiatric problem 09/07/2023    PTSD- not medicated    Pulmonary hypertension (HCC)     Sleep apnea 2015    Uses Bi-pap    Snoring     sleep study done    Tonsillitis     history of    Urinary urgency 09/07/2023        Past Surgical History:   Procedure Laterality Date    MULTIPLE CORONARY ARTERY BYPASS ENDO VEIN HARVEST  9/15/2023    Procedure: CORONARY ARTERY BYPASS GRAFTING X3, ENDOSCOPIC VEIN HARVEST, TRANSESOPHAGEAL ECHOCARDIOGRAM;  Surgeon: Lizet Mann M.D.;  Location: SURGERY [facility];  Service: Cardiac    ECHOCARDIOGRAM, TRANSESOPHAGEAL, INTRAOPERATIVE  9/15/2023    Procedure: ECHOCARDIOGRAM, TRANSESOPHAGEAL, INTRAOPERATIVE;  Surgeon: Lizet Mann M.D.;  Location: SURGERY [facility];  Service: Cardiac    UMBILICAL HERNIA REPAIR  02/23/2015    Performed by John H Ganser, M.D. at [facility] ORS    OTHER ORTHOPEDIC SURGERY Right 2009    Right Knee Repair    OTHER ORTHOPEDIC SURGERY Right 1988    Right Meniscus    [id number]      NASAL RECONSTRUCTION      deviatum septum, sinus surgery around 1998,2013    TONSILLECTOMY      US GASTROINTESTIONAL ENDOSCOPIC      multiple for pancreatitis       Family History   Problem Relation Age of Onset    Heart Attack Father     Heart Disease Father     Sleep Apnea Father        Social History     Socioeconomic History    Marital status:      Spouse name: Not on file    Number of children: Not on file     Years of education: Not on file    Highest education level: Not on file   Occupational History    Not on file   Tobacco Use    Smoking status: Never     Passive exposure: Never    Smokeless tobacco: Never   Vaping Use    Vaping status: Never Used   Substance and Sexual Activity    Alcohol use: Not Currently    Drug use: No    Sexual activity: Not on file   Other Topics Concern    Not on file   Social History Narrative    Not on file     Social Drivers of Health     Financial Resource Strain: Not on file   Food Insecurity: Not on file   Transportation Needs: Not on file   Physical Activity: Not on file   Stress: Not on file   Social Connections: Not on file   Intimate Partner Violence: Not on file   Housing Stability: Not on file       Current Outpatient Medications   Medication Sig Dispense Refill    Tirzepatide 7.5 MG/0.5ML Solution Auto-injector Inject 7.5 mg under the skin every 7 days. 2 mL 3    insulin glargine (LANTUS SOLOSTAR) 100 UNIT/ML Solution Pen-injector injection Inject 20-25 Units under the skin every evening. 25 mL 3    dapagliflozin propanediol (FARXIGA) 10 MG Tab Take 1 Tablet by mouth every day. 100 Tablet 1    Dulaglutide (TRULICITY) 4.5 MG/0.5ML Solution Auto-injector Inject 4.5 mg under the skin every 7 days. 6 mL 3    metFORMIN ER (GLUCOPHAGE XR) 500 MG TABLET SR 24 HR TAKE 2 TABLETS BY MOUTH TWICE DAILY 400 Tablet 1    Continuous Glucose Sensor (DEXCOM G7 SENSOR) Misc Apply sensor on the back of the upper arm every 10 days 9 Each 3    multivitamin Tab Take 1 Tablet by mouth every day.      Ascorbic Acid (VITAMIN C PO) Take  by mouth every day.      atorvastatin (LIPITOR) 40 MG Tab Take 1 Tablet by mouth every evening. 90 Tablet 1    metoprolol tartrate (LOPRESSOR) 25 MG Tab Take 1 Tablet by mouth 2 times a day. 180 Tablet 1    Insulin Pen Needle 32 G x 4 mm Use one pen needle in pen device to inject insulin daily 100 Each 3    Blood Glucose Test Strips Use one One Touch Verio strip to test  "blood sugar twice daily. 200 Strip 3    sertraline (ZOLOFT) 50 MG Tab Take 50 mg by mouth every day.      aspirin 81 MG EC tablet Take 1 Tablet by mouth every day. 30 Tablet 2    Blood Glucose Monitoring Suppl (BLOOD GLUCOSE MONITOR SYSTEM) w/Device Kit Test blood sugar as recommended by provider. 1 Kit 0    Lancets Use one lancet to test blood sugar three times daily before meals. 100 Each 0    Alcohol Swabs Wipe site with prep pad prior to injection. 100 Each 0    cetirizine (ZYRTEC) 10 MG Tab Take 10 mg by mouth every day.      acetaminophen (TYLENOL) 500 MG Tab Take 1,000 mg by mouth 1 time a day as needed for Mild Pain.      tamsulosin (FLOMAX) 0.4 MG capsule Take 0.4 mg by mouth 1/2 hour after breakfast.      Pancrelipase, Lip-Prot-Amyl, (CREON) 02023-314827 units Cap DR Particles Take 2 Tablets by mouth in the morning, at noon, and at bedtime.      omeprazole (PRILOSEC) 20 MG delayed-release capsule Take 20 mg by mouth every day.       No current facility-administered medications for this visit.        ALLERGIES: Patient has no active allergies.    ROS  Constitutional: Denies fevers, Denies weight changes  Ears/Nose/Throat/Mouth: Denies nasal congestion or sore throat   Cardiovascular: Denies chest pain  Respiratory: Denies shortness of breath, Denies cough  Gastrointestinal/Hepatic: Denies nausea, vomiting  Sleep: see HPI      PHYSICAL EXAM  /70 (BP Location: Left arm, Patient Position: Sitting, BP Cuff Size: Large adult)   Pulse 78   Resp 16   Ht 1.753 m (5' 9\")   Wt 98.9 kg (218 lb)   SpO2 91%   BMI 32.19 kg/m²   Appearance: Well-nourished, well-developed, no acute distress  Eyes:  No scleral icterus , EOMI  Musculoskeletal:  Grossly normal; gait and station normal; digits and nails normal  Skin:  No rashes, petechiae, cyanosis  Neurologic: without focal signs; oriented to person, time, place, and purpose; judgement intact      Medical Decision Making   Assessment and Plan  Yadiel Moody " is a 69 y.o.male  with CAD status post CABG, history of obstructive sleep apnea on BiPAP.  Presents today to follow-up regarding management of obstructive sleep apnea.    The medical record was reviewed.    Obstructive sleep apnea  Compliance data reviewed showing 100% usage > 4hours in last 30  days. Average AHI 1.4 events/hour. Pt continues to use and benefit from machine.      Current Settings BiPAP 8/4    Discussed that he is likely eligible for a new machine at this time.  Patient would like to hold off implants potentially get his machine replaced next year.    PLAN:   -Order placed for mask and supplies   -Advised to reach out via [employer] with questions     Has been advised to continue the current BiPAP, clean equipment frequently, and get new mask and supplies as allowed by insurance and DME. Recommend an earlier appointment, if significant treatment barriers develop.    Patients with NARCISO are at increased risk of cardiovascular disease including coronary artery disease, systemic arterial hypertension, pulmonary arterial hypertension, cardiac arrythmias, and stroke.     Return in about 1 year (around 2/24/2026).      Please note portions of this record was created using voice recognition software. I have made every reasonable attempt to correct obvious errors, but I expect that there are errors of grammar and possibly content I did not discover before finalizing the note.

## 2025-03-23 DIAGNOSIS — E11.9 TYPE 2 DIABETES MELLITUS WITHOUT COMPLICATION, WITHOUT LONG-TERM CURRENT USE OF INSULIN (HCC): ICD-10-CM

## 2025-03-25 RX ORDER — PEN NEEDLE, DIABETIC 32GX 5/32"
NEEDLE, DISPOSABLE MISCELLANEOUS
Qty: 100 EACH | Refills: 3 | Status: SHIPPED | OUTPATIENT
Start: 2025-03-25

## 2025-03-27 ENCOUNTER — TELEPHONE (OUTPATIENT)
Dept: CARDIOLOGY | Facility: MEDICAL CENTER | Age: 70
End: 2025-03-27
Payer: MEDICARE

## 2025-03-27 DIAGNOSIS — I25.118 CORONARY ARTERY DISEASE OF NATIVE ARTERY OF NATIVE HEART WITH STABLE ANGINA PECTORIS (HCC): ICD-10-CM

## 2025-03-27 DIAGNOSIS — E78.00 PURE HYPERCHOLESTEROLEMIA: ICD-10-CM

## 2025-04-04 ENCOUNTER — NON-PROVIDER VISIT (OUTPATIENT)
Dept: VASCULAR LAB | Facility: MEDICAL CENTER | Age: 70
End: 2025-04-04
Attending: INTERNAL MEDICINE
Payer: MEDICARE

## 2025-04-04 DIAGNOSIS — E11.9 TYPE 2 DIABETES MELLITUS WITHOUT COMPLICATION, WITHOUT LONG-TERM CURRENT USE OF INSULIN (HCC): ICD-10-CM

## 2025-04-04 LAB
HBA1C MFR BLD: 7 % (ref ?–5.8)
POCT INT CON NEG: ABNORMAL
POCT INT CON POS: ABNORMAL

## 2025-04-04 PROCEDURE — 99212 OFFICE O/P EST SF 10 MIN: CPT

## 2025-04-04 PROCEDURE — 83036 HEMOGLOBIN GLYCOSYLATED A1C: CPT

## 2025-04-04 RX ORDER — TIRZEPATIDE 10 MG/.5ML
10 INJECTION, SOLUTION SUBCUTANEOUS
Qty: 6 ML | Refills: 1 | Status: SHIPPED | OUTPATIENT
Start: 2025-04-04

## 2025-04-04 NOTE — PROGRESS NOTES
Patient Consult Note    TIME IN: 09.30  TIME OUT: 10.00    Primary care physician: Trent Doll M.D.    Reason for Consult: Management of Uncontrolled Type 2 Diabetes    Date Referral Placed: 02/25/2025    HPI:  Yadiel Moody is a 69 y.o. old patient who comes in today for evaluation of above stated problem.    Allergies  Patient has no active allergies.    Current Diabetes Medication Regimen  Metformin ER: 1000 mg BID  GLP-1 or GLP-1/GIP Agent: Trulicity 7.5 mg once weekly  SGLT-2 Inhibitor: dapagliflozin (Farxiga) 10 mg daily  Basal Insulin: Lantus 20 units daily    Previous Diabetes Medications and Reason for Discontinuation  Trulicity - previously d/c as it was thought to cause pancreatic pain, however pt was hospitalized at [facility] summer of 2024 for what he thought was pancreatic pain, but imaging/diagnostics was negative for pancreatic involvement, instead he states he was diagnosed with diverticulosis.     Potential Barriers to Care:  Adherence: denies missed doses  Side effects: none  Affordability: Yes    SMBG  Patient has a CGM Dexcom G7      Pt reports blood sugars:   30 day Clarity Data  2% - very High   17% High   80% In range   <1% Low   <1% Very Low      30 day glucose summary- 158  7.1% GMI    Hypoglycemia  Hypoglycemia awareness: No   Nocturnal hypoglycemia: None  Hypoglycemia:  None  Pt's treatment of Hypoglycemia  Discussed 15:15 Rule    Lifestyle  Current Exercise - 3-4 days a week at a gym     Dietary -  Breakfast - Ham and cottage cheese or eggs or clayton  Lunch - Snacks see below- Rarely eats a lunch  Dinner - Normal adult  Snacks - Cheese, fruits  Drinks - Water, coffee, Tea.    Labs  Lab Results   Component Value Date/Time    HBA1C 7.0 (A) 04/04/2025 12:00 AM    HBA1C 8.0 (A) 01/03/2025 10:12 AM    HBA1C 8.2 (A) 09/26/2024 12:00 AM      Lab Results   Component Value Date/Time    SODIUM 127 (L) 12/28/2023 11:56 AM    POTASSIUM 5.0 12/28/2023 11:56 AM    CHLORIDE 92 (L)  "12/28/2023 11:56 AM    CO2 24 12/28/2023 11:56 AM    GLUCOSE 113 (H) 12/28/2023 11:56 AM    BUN 16 12/28/2023 11:56 AM    CREATININE 0.78 12/28/2023 11:56 AM     Lab Results   Component Value Date/Time    ALKPHOSPHAT 129 (H) 12/28/2023 11:56 AM    ASTSGOT 19 12/28/2023 11:56 AM    ALTSGPT 17 12/28/2023 11:56 AM    TBILIRUBIN 0.4 12/28/2023 11:56 AM    INR 1.30 (H) 09/15/2023 02:10 PM    ALBUMIN 4.5 12/28/2023 11:56 AM      Lab Results   Component Value Date/Time    CHOLSTRLTOT 129 12/28/2023 11:56 AM    LDL 56 12/28/2023 11:56 AM    HDL 45 12/28/2023 11:56 AM    TRIGLYCERIDE 141 12/28/2023 11:56 AM       No results found for: \"MALBCRT\", \"MICROALBUR\"    Physical Examination:  Vital signs: There were no vitals taken for this visit. There is no height or weight on file to calculate BMI.    Assessment and Plan:    1. DM2  Discussed Goals: FBG 80 - 130, 2hPP < 180, a1c < 7.0%  Last a1c drawn on 04/04/2025 was 7.0%, which is not at goal and has improved since the previous reading  At the previous apt, Trulicity was discontinued and switched to Mounjaro 7.5 mg once weekly  Patient has tolerated this change well without any side effects, 3 months of Mounjaro has helped decrease A1C by 1%  Mounjaro will be increased to 10 mg once weekly    - Medication changes:  Increase Mounjaro from 7.5 mg to 10 mg once weekly  - Continue:  Lantus 20 U, Metformin 1000 mg twice daily and Farxiga 10 mg     - Lifestyle changes:  Exercise Goal - Continue gym workouts 3-4 days a week  Dietary Goal - Focus on decreased portion sizes and low carb options    - Preventative management:  REC DM Score: N/A  Care gaps addressed:   A1c now at 7.0  Care gaps updated in Health Maintenance    Follow Up:  3 months    Yuliana Reynolds, PharmD    CC:   Trent Doll M.D.    "

## 2025-04-16 ENCOUNTER — HOSPITAL ENCOUNTER (OUTPATIENT)
Dept: LAB | Facility: MEDICAL CENTER | Age: 70
End: 2025-04-16
Attending: INTERNAL MEDICINE
Payer: MEDICARE

## 2025-04-16 DIAGNOSIS — I25.118 CORONARY ARTERY DISEASE OF NATIVE ARTERY OF NATIVE HEART WITH STABLE ANGINA PECTORIS (HCC): ICD-10-CM

## 2025-04-16 DIAGNOSIS — E78.00 PURE HYPERCHOLESTEROLEMIA: ICD-10-CM

## 2025-04-16 LAB
CHOLEST SERPL-MCNC: 116 MG/DL (ref 100–199)
HDLC SERPL-MCNC: 33 MG/DL
LDLC SERPL CALC-MCNC: 56 MG/DL
TRIGL SERPL-MCNC: 136 MG/DL (ref 0–149)

## 2025-04-16 PROCEDURE — 80061 LIPID PANEL: CPT

## 2025-04-16 PROCEDURE — 36415 COLL VENOUS BLD VENIPUNCTURE: CPT

## 2025-04-17 ENCOUNTER — RESULTS FOLLOW-UP (OUTPATIENT)
Dept: CARDIOLOGY | Facility: MEDICAL CENTER | Age: 70
End: 2025-04-17

## 2025-05-17 DIAGNOSIS — E11.65 TYPE 2 DIABETES MELLITUS WITH HYPERGLYCEMIA, WITH LONG-TERM CURRENT USE OF INSULIN (HCC): ICD-10-CM

## 2025-05-17 DIAGNOSIS — E11.9 TYPE 2 DIABETES MELLITUS WITHOUT COMPLICATION, WITHOUT LONG-TERM CURRENT USE OF INSULIN (HCC): ICD-10-CM

## 2025-05-17 DIAGNOSIS — Z79.4 TYPE 2 DIABETES MELLITUS WITH HYPERGLYCEMIA, WITH LONG-TERM CURRENT USE OF INSULIN (HCC): ICD-10-CM

## 2025-05-20 ENCOUNTER — TELEPHONE (OUTPATIENT)
Dept: CARDIOLOGY | Facility: MEDICAL CENTER | Age: 70
End: 2025-05-20
Payer: MEDICARE

## 2025-05-20 NOTE — LETTER
PROCEDURE/SURGERY CLEARANCE FORM      Encounter Date: 5/20/2025    Patient: Yadiel Moody  YOB: 1955    CARDIOLOGIST:  Jamshid Lockett M.D     REFERRING DOCTOR:  No ref. provider found    Procedure/surgery: Aquablation, Transurethral destruction of prostate tissue by water jet                                           PROCEDURE/SURGERY CLEARANCE FORM    Date: 5/21/2025   Patient Name: Yadiel Moody    Dear Surgeon or Proceduralist,      Thank you for your request for cardiac stratification of our mutual patient Yadiel Moody 1955. We have reviewed their [facility] records; and to the best of our understanding this patient has not had stenting, ablation, watchman, cardiothoracic surgery or hospitalization for cardiovascular reasons in the past 6 months.  Yadiel Moody has been seen within the past 15 months and is considered to have non-modifiable cardiac risk for this low-risk procedure/surgery. They may proceed from a cardiovascular standpoint and may hold their antiplatelet/anticoagulation as briefly as possible. Please have patient resume this medication when hemodynamically stable to do so.     Aspirin or Prasugrel   - hold 7 days prior to procedure/surgery, resume when hemodynamically stable      Clopidrogrel or Ticagrelor  - hold 7 days for all neurological procedures, hold 5 days prior to all other procedure/surgery,  resume when hemodynamically stable     Warfarin - hold 7 days for all neurological procedures, hold 5 days prior to all other procedure/surgery and coordinate with [facility] ([phone]) INR testing and dose management.      Pradaxa/Xarelto/Eliquis/Savesya - hold 1 day prior to procedure for low bleeding risk procedure, 2 days for high bleeding risk procedure, or consider holding 3 days or longer for patients with reduced kidney function (CrCl <30mL/min) or spinal/cranial surgeries/procedures.      If they have a mechanical heart valve,  please coordinate with [facility] ([phone]) the proper management of their anticoagulant in the periprocedural or perioperative period.      Some patients have higher risk for cardiovascular complications or holding medication. If our patient has had prior complications of holding antiplatelet or anticoagulants in the past and we have seen them after these events, we have addressed these concerns with the patient. They are at an unknown degree of increased risk for recurrent complication.  You may hold anticoagulation/antiplatelets for the procedure or surgery if the benefits of the procedure or surgery outweigh this nonmodifiable risk.      If Yadiel Moody 1955 has new symptoms of heart failure decompensation, unstable arrythmia, or angina please reach out and we will assess the patient.      If you have other patient-specific concerns, please feel free to reach out to the patient's cardiologist directly at [phone].     Thank you,       [facility]       Electronically Signed      MD Signature   Jamshid Lockett M.D

## 2025-05-21 NOTE — TELEPHONE ENCOUNTER
Last OV: 6/20/24  Proposed Surgery: Aquablation, Transurethral destruction of prostate tissue by water jet  Surgery Date: 8/26/25  Requesting Office Name: [facility]  Fax Number: [phone]  Preference of Location (default is surgery center unless specified by Cardiologist or CHRISTO)  Prior Clearance Addressed: No    Is this a general clearance? YES   Anticoags/Antiplatelets: Aspirin  Anticoags/Antiplatelet managed by Cardiology? YES    Outstanding Cardiac Imaging : No  Ablation, Cardioversion, Stent, Cardiac Devices, Catheterization, Watchman: No  TAVR/Valve, Mitral Clip, Watchman (including open heart),: N/A   Recent Cardiac Hospitalization: No            When: N/A  History (cardiac history): Past Medical History[1]        Is this a dental clearance? NO  Ablation, Cardioversion, Watchman, Stents, Cath, Devices within the last 3 months? No   If yes- Send dental wait letter, do not forward to provider for review.     TAVR / Valve, Mitral clip within the last 6 months? No  If yes- Send dental wait letter, do not forward to provider for review.     If completing a general clearance, continue per protocol.           Surgical Clearance Letter Sent: YES   **Scan clearance request letter into [employer].**        [1]   Past Medical History:  Diagnosis Date    Allergic rhinitis     Anesthesia     slow to wake up; ?apneic, needs encourangement to breathe    Back pain     Bowel habit changes 09/07/2023    diarrhea at times r/t pancreatitis    Cataract     minimal; NOT surgically corrected    Chickenpox     history of    [place]en[place] measles     history of    Heart burn     High cholesterol     medicated    Hx of lithotripsy     Hyperlipidemia     Hypertension     medicated    Influenza     history of    Mumps     history of    Myocardial infarct (HCC) 10/2022    x 3    Nasal drainage     Obesity     Pancreatitis 10/09/2022    Psychiatric problem 09/07/2023    PTSD- not medicated    Pulmonary hypertension (HCC)     Sleep apnea  2015    Uses Bi-pap    Snoring     sleep study done    Tonsillitis     history of    Urinary urgency 09/07/2023

## 2025-05-22 RX ORDER — METFORMIN HYDROCHLORIDE 500 MG/1
1000 TABLET, EXTENDED RELEASE ORAL 2 TIMES DAILY
Qty: 400 TABLET | Refills: 1 | Status: SHIPPED | OUTPATIENT
Start: 2025-05-22

## 2025-06-06 ENCOUNTER — OFFICE VISIT (OUTPATIENT)
Facility: MEDICAL CENTER | Age: 70
End: 2025-06-06
Attending: INTERNAL MEDICINE
Payer: MEDICARE

## 2025-06-06 VITALS
HEIGHT: 69 IN | HEART RATE: 70 BPM | DIASTOLIC BLOOD PRESSURE: 70 MMHG | BODY MASS INDEX: 32.73 KG/M2 | WEIGHT: 221 LBS | RESPIRATION RATE: 16 BRPM | SYSTOLIC BLOOD PRESSURE: 112 MMHG

## 2025-06-06 DIAGNOSIS — Z95.1 HX OF CABG: ICD-10-CM

## 2025-06-06 DIAGNOSIS — I25.118 CORONARY ARTERY DISEASE OF NATIVE ARTERY OF NATIVE HEART WITH STABLE ANGINA PECTORIS (HCC): Primary | ICD-10-CM

## 2025-06-06 DIAGNOSIS — I65.23 BILATERAL CAROTID ARTERY STENOSIS: ICD-10-CM

## 2025-06-06 DIAGNOSIS — I10 PRIMARY HYPERTENSION: ICD-10-CM

## 2025-06-06 DIAGNOSIS — E78.00 PURE HYPERCHOLESTEROLEMIA: ICD-10-CM

## 2025-06-06 PROCEDURE — 99214 OFFICE O/P EST MOD 30 MIN: CPT | Performed by: INTERNAL MEDICINE

## 2025-06-06 PROCEDURE — 3078F DIAST BP <80 MM HG: CPT | Performed by: INTERNAL MEDICINE

## 2025-06-06 PROCEDURE — 3074F SYST BP LT 130 MM HG: CPT | Performed by: INTERNAL MEDICINE

## 2025-06-06 PROCEDURE — 99211 OFF/OP EST MAY X REQ PHY/QHP: CPT | Performed by: INTERNAL MEDICINE

## 2025-06-06 RX ORDER — LOSARTAN POTASSIUM 25 MG/1
25 TABLET ORAL DAILY
Qty: 100 TABLET | Refills: 3 | Status: SHIPPED | OUTPATIENT
Start: 2025-06-06

## 2025-06-06 RX ORDER — ROSUVASTATIN CALCIUM 20 MG/1
20 TABLET, COATED ORAL DAILY
COMMUNITY
Start: 2025-04-11

## 2025-06-06 ASSESSMENT — ENCOUNTER SYMPTOMS
PALPITATIONS: 0
CONSTITUTIONAL NEGATIVE: 1
NERVOUS/ANXIOUS: 0
FOCAL WEAKNESS: 0
DIZZINESS: 0
CHILLS: 0
WEIGHT LOSS: 0
COUGH: 0
CLAUDICATION: 0
BLURRED VISION: 0
RESPIRATORY NEGATIVE: 1
DOUBLE VISION: 0
NEUROLOGICAL NEGATIVE: 1
MYALGIAS: 0
HEADACHES: 0
VOMITING: 0
GASTROINTESTINAL NEGATIVE: 1
CARDIOVASCULAR NEGATIVE: 1
DEPRESSION: 0
ABDOMINAL PAIN: 0
NAUSEA: 0
FEVER: 0
WEAKNESS: 0
EYES NEGATIVE: 1
SHORTNESS OF BREATH: 0
MUSCULOSKELETAL NEGATIVE: 1
PSYCHIATRIC NEGATIVE: 1
BRUISES/BLEEDS EASILY: 0

## 2025-06-06 ASSESSMENT — FIBROSIS 4 INDEX: FIB4 SCORE: 1.67

## 2025-06-06 NOTE — PROGRESS NOTES
No chief complaint on file.      Subjective     Yadiel Moody is a 69 y.o. male who presents today for annual follow up of coronary artery disease with prior coronary artery bypass graft surgery, hypertension and hyperlipidemia, carotid artery stenosis.    Since the patient's last visit on 06/20/24, he has been doing well clinically from cardiac standpoint. He admit to chest wall tenderness. He denies fatigue, chest pain, shortness of breath, palpitations, lower extremity edema, dizziness or syncope. He keeps active exercising at the gym with a .     Past Medical History[1]  Past Surgical History[2]  Family History   Problem Relation Age of Onset    Heart Attack Father     Heart Disease Father     Sleep Apnea Father      Social History     Socioeconomic History    Marital status:      Spouse name: Not on file    Number of children: Not on file    Years of education: Not on file    Highest education level: Not on file   Occupational History    Not on file   Tobacco Use    Smoking status: Never     Passive exposure: Never    Smokeless tobacco: Never   Vaping Use    Vaping status: Never Used   Substance and Sexual Activity    Alcohol use: Not Currently    Drug use: No    Sexual activity: Not on file   Other Topics Concern    Not on file   Social History Narrative    Not on file     Social Drivers of Health     Financial Resource Strain: Not on file   Food Insecurity: Not on file   Transportation Needs: Not on file   Physical Activity: Not on file   Stress: Not on file   Social Connections: Not on file   Intimate Partner Violence: Not on file   Housing Stability: Not on file     Allergies[3]  Encounter Medications[4]  Review of Systems   Constitutional: Negative.  Negative for chills, fever, malaise/fatigue and weight loss.   HENT: Negative.  Negative for hearing loss.    Eyes: Negative.  Negative for blurred vision and double vision.   Respiratory: Negative.  Negative for cough and shortness of  "breath.    Cardiovascular: Negative.  Negative for chest pain, palpitations, claudication and leg swelling.   Gastrointestinal: Negative.  Negative for abdominal pain, nausea and vomiting.   Genitourinary: Negative.  Negative for dysuria and urgency.   Musculoskeletal: Negative.  Negative for joint pain and myalgias.   Skin: Negative.  Negative for itching and rash.   Neurological: Negative.  Negative for dizziness, focal weakness, weakness and headaches.   Endo/Heme/Allergies: Negative.  Does not bruise/bleed easily.   Psychiatric/Behavioral: Negative.  Negative for depression. The patient is not nervous/anxious.               Objective     /70 (BP Location: Left arm, Patient Position: Sitting, BP Cuff Size: Adult)   Pulse 70   Resp 16   Ht 1.753 m (5' 9\")   Wt 100 kg (221 lb)   BMI 32.64 kg/m²     Physical Exam  Constitutional:       Appearance: Normal appearance. He is well-developed and normal weight.   HENT:      Head: Normocephalic and atraumatic.   Neck:      Vascular: No JVD.   Cardiovascular:      Rate and Rhythm: Normal rate and regular rhythm.      Heart sounds: Normal heart sounds.   Pulmonary:      Effort: Pulmonary effort is normal.      Breath sounds: Normal breath sounds.   Abdominal:      General: Bowel sounds are normal.      Palpations: Abdomen is soft.      Comments: No hepatosplenomegaly.   Musculoskeletal:         General: Normal range of motion.   Lymphadenopathy:      Cervical: No cervical adenopathy.   Skin:     General: Skin is warm and dry.   Neurological:      Mental Status: He is alert and oriented to person, place, and time.            CARDIAC STUDIES/PROCEDURES:     CARDIAC CATHETERIZATION CONCLUSIONS by Vazquez Romero  (08/22/23)  1.  Severe multivessel CAD  2.  Normal resting LVEDP 9 mmHg with mild transaortic gradient on pullback < 20mmHg     CARDIAC CATHETERIZATION CONCLUSIONS [facility] [place] (11/11/22)  Cardiac catheterization showing severe 3 " vessel coronary artery disease.     CAROTID ULTRASOUND (09/01/23)  Bilateral.   Mild plaque of the carotid bifurcation. Doppler velocities are normal   throughout the carotid arteries without evidence of hemodynamically   significant stenosis.     CT OF ABDOMEN [facility](03/08/23)  Vasculature: Non-aneurysmal abdominal aorta.      ECHOCARDIOGRAM CONCLUSIONS (01/04/24)  Prior study on 8/17/2023, compared to the report of the prior study,   there has been no significant change.   Normal left ventricular systolic function.  The left ventricular ejection fraction is visually estimated to be 60%.  Mild tricuspid regurgitation.  Estimated right ventricular systolic pressure is 20 mmHg.    ECHOCARDIOGRAM CONCLUSIONS (08/17/23)  Prior study on 1/19/23, compared to the report of the prior study,   there has been no significant change.   Normal left ventricular systolic function.  The left ventricular ejection fraction is visually estimated to be 65%.  No significant valvular abnormalities.      ECHOCARDIOGRAM CONCLUSIONS [facility] [place] (11/10/22)  Echocardiogram showing ejection fraction of 35-40%..     ECHOCARDIOGRAM CONCLUSIONS (01/19/13)  Diastolic dysfunction. Otherwise, structurally normal heart by surface echocardiogram.   Ejection fraction of 60-65%.     EKG performed on (09/17/23) EKG personally interpreted shows sinus rhythm.   EKG performed on (05/22/23) EKG shows sinus rhythm.   EKG performed on (12/28/12) EKG shows normal sinus rhythm with early R/S transition and Q waves of lead III.     Laboratory results of (04/167/25) were reviewed. Cholesterol profile of 116/136/33/56 mg/dL noted.  Laboratory results of (12/28/23) Cholesterol profile of 129/14/45/56 mg/dL noted.   Laboratory results of (10/28/20) Cholesterol profile of 246/347/38/139 mg/dL noted.   Laboratory results of (01/18/13) Cholesterol profile of 202/276/41/106 noted.     MPI CONCLUSIONS (01/19/13)  1. No fixed or  reversible left ventricular perfusion defects.  2. Normal LV wall motion. Ejection fraction is 62%.    Assessment & Plan     1. Coronary artery disease of native artery of native heart with stable angina pectoris (HCC)        2. Hx of CABG        3. Primary hypertension        4. Pure hypercholesterolemia        5. Bilateral carotid artery stenosis            Medical Decision Making: Today's Assessment/Status/Plan:        Coronary artery disease with prior coronary bypass graft (x3 left internal mammary artery to left anterior descending artery saphenous vein graft to obtuse marginal artery, saphenous vein graft to distal right coronary artery by Lizet Robbins 09/15/23): He is a 69 year old male with coronary artery disease with prior coronary artery bypass graft surgery, hypertension and hyperlipidemia, carotid artery stenosis. He is clinically doing well from coronary standpoint. I will continue with current medical care including aspirin, metoprolol, rosuvastatin. We will start angiotensin receptor blockade with losartan 25 mg QD.   Hypertension: Currently, the average blood pressure is well controlled. We will continue with beta blockade therapy.  Hyperlipidemia: He is doing well on statin therapy without myalgia symptoms.  Carotid artery stenosis: Clinically stable on above medical therapy.  Health maintenance: He was diagnosed with necrotizing pancreatitis while visiting his grandchildren in [place], transferring to [place] and ultimately at [facility] in [place] in 10/09/22-03/19/23. (Managed by [facility]), NSTEMI (10/09/22 with hemorrhagic necrotizing pancreatitis with E. Coli sepsis),  Additional information: He and his wife lives in [place], [place].     We will follow up in 3 months.    CC Trent Wilson                    [1]   Past Medical History:  Diagnosis Date    Allergic rhinitis     Anesthesia     slow to wake up; ?apneic, needs encourangement to breathe    Back pain      Bowel habit changes 09/07/2023    diarrhea at times r/t pancreatitis    Cataract     minimal; NOT surgically corrected    Chickenpox     history of    [place] citizen measles     history of    Heart burn     High cholesterol     medicated    Hx of lithotripsy     Hyperlipidemia     Hypertension     medicated    Influenza     history of    Mumps     history of    Myocardial infarct (HCC) 10/2022    x 3    Nasal drainage     Obesity     Pancreatitis 10/09/2022    Psychiatric problem 09/07/2023    PTSD- not medicated    Pulmonary hypertension (HCC)     Sleep apnea 2015    Uses Bi-pap    Snoring     sleep study done    Tonsillitis     history of    Urinary urgency 09/07/2023   [2]   Past Surgical History:  Procedure Laterality Date    MULTIPLE CORONARY ARTERY BYPASS ENDO VEIN HARVEST  9/15/2023    Procedure: CORONARY ARTERY BYPASS GRAFTING X3, ENDOSCOPIC VEIN HARVEST, TRANSESOPHAGEAL ECHOCARDIOGRAM;  Surgeon: Lizet Mann M.D.;  Location: SURGERY [facility];  Service: Cardiac    ECHOCARDIOGRAM, TRANSESOPHAGEAL, INTRAOPERATIVE  9/15/2023    Procedure: ECHOCARDIOGRAM, TRANSESOPHAGEAL, INTRAOPERATIVE;  Surgeon: Lizet Mann M.D.;  Location: [facility];  Service: Cardiac    UMBILICAL HERNIA REPAIR  02/23/2015    Performed by John H Ganser, M.D. at SURGERY [facility]    OTHER ORTHOPEDIC SURGERY Right 2009    Right Knee Repair    OTHER ORTHOPEDIC SURGERY Right 1988    Right Meniscus    [id number]      NASAL RECONSTRUCTION      deviatum septum, sinus surgery around 1998,2013    TONSILLECTOMY      US GASTROINTESTIONAL ENDOSCOPIC      multiple for pancreatitis   [3] No Active Allergies  [4]   Outpatient Encounter Medications as of 6/6/2025   Medication Sig Dispense Refill    rosuvastatin (CRESTOR) 20 MG Tab Take 20 mg by mouth every day.      MAGNESIUM PO Take  by mouth.      POTASSIUM PO Take  by mouth.      metFORMIN ER (GLUCOPHAGE XR) 500 MG TABLET SR 24 HR TAKE 2 TABLETS BY MOUTH TWICE DAILY 400 Tablet 1     Tirzepatide (MOUNJARO) 10 MG/0.5ML Solution Auto-injector Inject 10 mg under the skin every 7 days. 6 mL 1    Insulin Pen Needle 32 G x 4 mm (BD PEN NEEDLE RIVERA 2ND GEN) USE ONE PEN NEEDLE IN PEN DEVICE TO INJECT INSULIN DAILY 100 Each 3    insulin glargine (LANTUS SOLOSTAR) 100 UNIT/ML Solution Pen-injector injection Inject 20-25 Units under the skin every evening. 25 mL 3    dapagliflozin propanediol (FARXIGA) 10 MG Tab Take 1 Tablet by mouth every day. 100 Tablet 1    Continuous Glucose Sensor (DEXCOM G7 SENSOR) Misc Apply sensor on the back of the upper arm every 10 days 9 Each 3    multivitamin Tab Take 1 Tablet by mouth every day.      Ascorbic Acid (VITAMIN C PO) Take  by mouth every day.      metoprolol tartrate (LOPRESSOR) 25 MG Tab Take 1 Tablet by mouth 2 times a day. 180 Tablet 1    Blood Glucose Test Strips Use one One Touch Verio strip to test blood sugar twice daily. 200 Strip 3    sertraline (ZOLOFT) 50 MG Tab Take 50 mg by mouth every day.      aspirin 81 MG EC tablet Take 1 Tablet by mouth every day. 30 Tablet 2    Blood Glucose Monitoring Suppl (BLOOD GLUCOSE MONITOR SYSTEM) w/Device Kit Test blood sugar as recommended by provider. 1 Kit 0    Lancets Use one lancet to test blood sugar three times daily before meals. 100 Each 0    Alcohol Swabs Wipe site with prep pad prior to injection. 100 Each 0    cetirizine (ZYRTEC) 10 MG Tab Take 10 mg by mouth every day.      acetaminophen (TYLENOL) 500 MG Tab Take 1,000 mg by mouth 1 time a day as needed for Mild Pain.      tamsulosin (FLOMAX) 0.4 MG capsule Take 0.4 mg by mouth 1/2 hour after breakfast.      Pancrelipase, Lip-Prot-Amyl, (CREON) 03898-440546 units Cap DR Particles Take 2 Tablets by mouth in the morning, at noon, and at bedtime.      omeprazole (PRILOSEC) 20 MG delayed-release capsule Take 20 mg by mouth every day.      atorvastatin (LIPITOR) 40 MG Tab Take 1 Tablet by mouth every evening. (Patient not taking: Reported on 6/6/2025) 90 Tablet  1     No facility-administered encounter medications on file as of 6/6/2025.

## 2025-06-27 ENCOUNTER — APPOINTMENT (OUTPATIENT)
Dept: VASCULAR LAB | Facility: MEDICAL CENTER | Age: 70
End: 2025-06-27
Attending: INTERNAL MEDICINE
Payer: MEDICARE

## 2025-06-30 ENCOUNTER — NON-PROVIDER VISIT (OUTPATIENT)
Dept: VASCULAR LAB | Facility: MEDICAL CENTER | Age: 70
End: 2025-06-30
Attending: INTERNAL MEDICINE
Payer: MEDICARE

## 2025-06-30 VITALS — SYSTOLIC BLOOD PRESSURE: 129 MMHG | DIASTOLIC BLOOD PRESSURE: 85 MMHG | HEART RATE: 74 BPM

## 2025-06-30 DIAGNOSIS — E11.9 TYPE 2 DIABETES MELLITUS WITHOUT COMPLICATION, WITHOUT LONG-TERM CURRENT USE OF INSULIN (HCC): ICD-10-CM

## 2025-06-30 DIAGNOSIS — Z79.4 TYPE 2 DIABETES MELLITUS WITH HYPERGLYCEMIA, WITH LONG-TERM CURRENT USE OF INSULIN (HCC): Primary | ICD-10-CM

## 2025-06-30 DIAGNOSIS — E11.65 TYPE 2 DIABETES MELLITUS WITH HYPERGLYCEMIA, WITH LONG-TERM CURRENT USE OF INSULIN (HCC): Primary | ICD-10-CM

## 2025-06-30 PROCEDURE — 99212 OFFICE O/P EST SF 10 MIN: CPT

## 2025-06-30 NOTE — PROGRESS NOTES
Patient Consult Note    Primary care physician: Trent Doll M.D.    Reason for Consult: Management of Uncontrolled Type 2 Diabetes    Date Referral Placed: 2/25/25    HPI:  Yadiel Moody is a 69 y.o. old patient who comes in today for evaluation of above stated problem.    Allergies  Patient has no active allergies.    Current Diabetes Medication Regimen  Metformin ER: 1000mg BID  GLP-1 or GLP-1/GIP Agent: tirzepatide (Mounjaro) 10 mg weekly  SGLT-2 Inhibitor: dapagliflozin (Farxiga) 10 mg daily  Basal Insulin: 20 units QD    Previous Diabetes Medications and Reason for Discontinuation  Trulicity - previously d/c as it was thought to cause pancreatic pain, however pt was hospitalized at [facility] summer of 2024 for what he thought was pancreatic pain, but imaging/diagnostics was negative for pancreatic involvement, instead he states he was diagnosed with diverticulosis.     Potential Barriers to Care:  Adherence: reports a few missed doses of Metformin + Farxiga but   Side effects: none  Affordability: no issues at this time     SMBG  Pt has home glucometer and proper testing technique - yes, pt has CGM    Pt reports blood sugars:   Before Breakfast: 130-150  80% in range  16 % high  3% very high  1% low (pt denies any lows that needed treatment)    Hypoglycemia  Hypoglycemia awareness: Yes  Nocturnal hypoglycemia: None  Hypoglycemia:  None  Pt's treatment of Hypoglycemia  Discussed 15:15 Rule    Lifestyle  Current Exercise - 3-4 x week at gym doing cardio and weights    Dietary - common adult - low carb based   Breakfast - cottage cheese, avacado, eggs, oatmeal  Lunch - usually skips shilpa eats a late breakfast   Dinner - protein, veggies, hotdog  Snacks -  fruits, carrots, small bowl of cereal (raisin bran, cherrios, keto friendly cereals) does have ice cream (1/2 cup about 3-4 x week)  Drinks - water and coffee    Labs  Lab Results   Component Value Date/Time    HBA1C 7.0 (A) 04/04/2025 12:00 AM     "HBA1C 8.0 (A) 01/03/2025 10:12 AM    HBA1C 8.2 (A) 09/26/2024 12:00 AM      Lab Results   Component Value Date/Time    SODIUM 127 (L) 12/28/2023 11:56 AM    POTASSIUM 5.0 12/28/2023 11:56 AM    CHLORIDE 92 (L) 12/28/2023 11:56 AM    CO2 24 12/28/2023 11:56 AM    GLUCOSE 113 (H) 12/28/2023 11:56 AM    BUN 16 12/28/2023 11:56 AM    CREATININE 0.78 12/28/2023 11:56 AM     Lab Results   Component Value Date/Time    ALKPHOSPHAT 129 (H) 12/28/2023 11:56 AM    ASTSGOT 19 12/28/2023 11:56 AM    ALTSGPT 17 12/28/2023 11:56 AM    TBILIRUBIN 0.4 12/28/2023 11:56 AM    INR 1.30 (H) 09/15/2023 02:10 PM    ALBUMIN 4.5 12/28/2023 11:56 AM      Lab Results   Component Value Date/Time    CHOLSTRLTOT 116 04/16/2025 09:29 AM    LDL 56 04/16/2025 09:29 AM    HDL 33 (A) 04/16/2025 09:29 AM    TRIGLYCERIDE 136 04/16/2025 09:29 AM       No results found for: \"MALBCRT\", \"MICROALBUR\"    Physical Examination:  Vital signs: /85   Pulse 74  There is no height or weight on file to calculate BMI.    Assessment and Plan:    1. DM2  Patient presents to clinic today to follow up on DM management  Discussed Goals: FBG 80 - 130, 2hPP < 180, a1c < 7.0%  Last a1c drawn on 4/4/25 was 7.0%, which is not at goal and has improved since the previous reading of 8.0 (1/3/25)  Patient reports his FBG has been improving as well and now stable in 120-140 range.   Patient reports tolerating the switch to Mounjaro just fine with no adverse effects to report. He barely notices any appetite suppression and has not lost any weight.   Patient just shy of next time for repeat A1c so will perform at next follow up so pt does not incur extra expenses, but anticipate it to fall below 7% according to recent FBG reported   Patient would like to continue to titrate dose of Mounjaro up so he can continue to improve overall BG and hopefully some weight loss as well.    - Medication changes:  START Mounjaro 12.5mg Q7d after last dose of Mounjaro 10mg Q7d   - " Continue:  Metformin 1000mg QD  Farxiga 10mg QD  Lantus 20 units QD      - Lifestyle changes:  Exercise Goal - Continue with the current exercise and increase as tolerable   Dietary Goal - continue to focus on low carb diet and cut back on the sweet treats and snacks. Maximize lean meats and veggies and limit carb    - Preventative management:  REC DM Score: n/a  Care gaps addressed:   N/A  Care gaps updated in Health Maintenance    Follow Up:  6 weeks    Marilyn Velez  PharmD      CC:   Trent Doll M.D.  Jaxon Willingham M.D.

## 2025-08-07 ENCOUNTER — HOSPITAL ENCOUNTER (OUTPATIENT)
Facility: MEDICAL CENTER | Age: 70
End: 2025-08-07
Attending: UROLOGY
Payer: MEDICARE

## 2025-08-07 LAB
APPEARANCE UR: CLEAR
BILIRUB UR QL STRIP.AUTO: NEGATIVE
COLOR UR: YELLOW
GLUCOSE UR STRIP.AUTO-MCNC: >=1000 MG/DL
KETONES UR STRIP.AUTO-MCNC: NEGATIVE MG/DL
LEUKOCYTE ESTERASE UR QL STRIP.AUTO: NEGATIVE
MICRO URNS: ABNORMAL
NITRITE UR QL STRIP.AUTO: NEGATIVE
PH UR STRIP.AUTO: 7 [PH] (ref 5–8)
PROT UR QL STRIP: NEGATIVE MG/DL
RBC UR QL AUTO: NEGATIVE
SP GR UR STRIP.AUTO: 1.01
UROBILINOGEN UR STRIP.AUTO-MCNC: 0.2 EU/DL

## 2025-08-07 PROCEDURE — 87086 URINE CULTURE/COLONY COUNT: CPT

## 2025-08-07 PROCEDURE — 81003 URINALYSIS AUTO W/O SCOPE: CPT

## 2025-08-09 LAB
BACTERIA UR CULT: NORMAL
SIGNIFICANT IND 70042: NORMAL
SITE SITE: NORMAL
SOURCE SOURCE: NORMAL

## 2025-08-11 ENCOUNTER — OFFICE VISIT (OUTPATIENT)
Dept: VASCULAR LAB | Facility: MEDICAL CENTER | Age: 70
End: 2025-08-11
Attending: INTERNAL MEDICINE
Payer: MEDICARE

## 2025-08-11 ENCOUNTER — SPECIALTY PHARMACY (OUTPATIENT)
Dept: VASCULAR LAB | Facility: MEDICAL CENTER | Age: 70
End: 2025-08-11

## 2025-08-11 DIAGNOSIS — Z79.4 TYPE 2 DIABETES MELLITUS WITH HYPERGLYCEMIA, WITH LONG-TERM CURRENT USE OF INSULIN (HCC): Primary | ICD-10-CM

## 2025-08-11 DIAGNOSIS — E11.65 TYPE 2 DIABETES MELLITUS WITH HYPERGLYCEMIA, WITH LONG-TERM CURRENT USE OF INSULIN (HCC): Primary | ICD-10-CM

## 2025-08-11 LAB
HBA1C MFR BLD: 6.4 % (ref ?–5.8)
POCT INT CON NEG: NEGATIVE
POCT INT CON POS: POSITIVE

## 2025-08-11 PROCEDURE — 83036 HEMOGLOBIN GLYCOSYLATED A1C: CPT

## 2025-08-11 PROCEDURE — 99213 OFFICE O/P EST LOW 20 MIN: CPT

## 2025-08-11 RX ORDER — VITAMIN B COMPLEX
1000 TABLET ORAL DAILY
COMMUNITY

## 2025-08-11 RX ORDER — ZINC SULFATE 50(220)MG
220 CAPSULE ORAL DAILY
COMMUNITY

## (undated) DEVICE — SENSOR OXIMETER ADULT SPO2 RD SET (20EA/BX)

## (undated) DEVICE — KIT INTROPERCUTANEOUS SHEATH - 8.5 FR W/MAX BARRIER AND BIOPATCH  (5/CA)

## (undated) DEVICE — BLOWER/MISTER (5EA/PK)

## (undated) DEVICE — CATHETER IV 14 GA X 2 ---SURG.& SDS ONLY---(200EA/CA)

## (undated) DEVICE — LEAD PACING TEMP MYO - (12/BX)

## (undated) DEVICE — CATHETER THERMALDILUTION SWAN - (5EA/CA)

## (undated) DEVICE — SUTURE OHS

## (undated) DEVICE — SET FLUID WARMING STANDARD FLOW - (10/CA)

## (undated) DEVICE — BLADE STERNUM SAW SURGICAL 32.0 X 6.4 MM STERILE (1/EA)

## (undated) DEVICE — RETRACTOR OFF PUMP OCTO ONLY - 10/BX

## (undated) DEVICE — GLOVE BIOGEL PI INDICATOR SZ 8.5 SURGICAL PF LF - (50PR/BX 4BX/CA)

## (undated) DEVICE — SUTURE 2-0 ETHIBOND SH D/A 36 (36PK/BX)"

## (undated) DEVICE — BLADE ELECTRODE EDGE INSULATED 4 IN (50EA/BX)

## (undated) DEVICE — PACK VEIN - (19/CA)

## (undated) DEVICE — SUTURE 0 ETHIBOND MO6 C/R - (12/BX) 8-18 INCH ETHICON

## (undated) DEVICE — CONTAINER SPECIMEN BAG OR - STERILE 4 OZ W/LID (100EA/CA)

## (undated) DEVICE — KIT RADIAL ARTERY 20GA W/MAX BARRIER AND BIOPATCH  (5EA/CA) #10740 IS FOR THE SET RADIAL ARTERIAL

## (undated) DEVICE — DRESSING SURGICAL NUKNIT 6X9 (10EA/BX)

## (undated) DEVICE — KIT SURGIFLO W/OUT THROMBIN - (6EA/CA)

## (undated) DEVICE — TUBING CLEARLINK DUO-VENT - C-FLO (48EA/CA)

## (undated) DEVICE — SUTURE 4-0 PROLENE RB-1 D/A 36 (36PK/BX)"

## (undated) DEVICE — PAD LAP STERILE 18 X 18 - (5/PK 40PK/CA)

## (undated) DEVICE — SPRING BULLDOG 1/2 FORCE BLUE - (10/BX)

## (undated) DEVICE — LACTATED RINGERS INJ 1000 ML - (14EA/CA 60CA/PF)

## (undated) DEVICE — DECANTER FLD BLS - (50/CA)

## (undated) DEVICE — SUTURE 4-0 30CM STRATAFIX SPIRAL PS-2 (12EA/BX)

## (undated) DEVICE — SUTURE 5 SURGICAL STEEL V-40 - (12/BX) CCS CURRENT

## (undated) DEVICE — KIT TOURNIQUET DLP (40EA/PK)

## (undated) DEVICE — PACK CV DRAPING/BASIN 2PART - (1/CA)

## (undated) DEVICE — CHLORAPREP 26 ML APPLICATOR - ORANGE TINT(25/CA)

## (undated) DEVICE — TUBING INSUFFLATION - (10/BX)

## (undated) DEVICE — SET BIFURCATED BLOOD - (48EA/CS)

## (undated) DEVICE — STOPCOCK MALE 4-WAY - (50/CA)

## (undated) DEVICE — DRAIN SILICONE CLOSED WOUND SUCTION CHANNEL 24FR ROUND HUBLESS (10/CA)

## (undated) DEVICE — TRAY MULTI-LUMEN 7FR PRESSURE W/MAX BARRIER AND BIOPATCH - (5/CA)

## (undated) DEVICE — INSERT STEALTH #5 - (10/BX)

## (undated) DEVICE — SUTURE 0 VICRYL PLUS CTX - 36 INCH (36/BX)

## (undated) DEVICE — GLOVE BIOGEL PI INDICATOR SZ 6.5 SURGICAL PF LF - (50/BX 4BX/CA)

## (undated) DEVICE — SOD. CHL. INJ. 0.9% 1000 ML - (14EA/CA 60CA/PF)

## (undated) DEVICE — COVER LIGHT HANDLE ALC PLUS DISP (18EA/BX)

## (undated) DEVICE — GLOVE BIOGEL INDICATOR SZ 6.5 SURGICAL PF LTX - (50PR/BX 4BX/CA)

## (undated) DEVICE — SUTURE 7-0 PROLENE 24BV175-6 - EP8735H (36/BX)"

## (undated) DEVICE — NEEDLE MAYO CATGUT TPR POINT - (2EA/PK20PK/BX)

## (undated) DEVICE — GLOVE BIOGEL SZ 6 PF LATEX - (50EA/BX 4BX/CA)

## (undated) DEVICE — DRAPE MAYO STAND - (30/CA)

## (undated) DEVICE — TOWEL STOP TIMEOUT SAFETY FLAG (40EA/CA)

## (undated) DEVICE — ELECTRODE DUAL RETURN W/ CORD - (50/PK)

## (undated) DEVICE — TRANSDUCER BIFURCATED MONITORING KIT (10EA/CA)

## (undated) DEVICE — PREP FAST FLOSEAL 5ML (6EA/CA)

## (undated) DEVICE — FIBRILLAR SURGICEL 4X4 - 10/CA

## (undated) DEVICE — PTFE PLED STER - (250/CA)

## (undated) DEVICE — MICRODRIP PRIMARY VENTED 60 (48EA/CA) THIS WAS PART #2C8428 WHICH WAS DISCONTINUED

## (undated) DEVICE — D-5-W INJ. 500 ML - (24EA/CA)

## (undated) DEVICE — PUNCH DISP VASCULAR 4.4 - 6/BX

## (undated) DEVICE — GOWN WARMING STANDARD FLEX - (30/CA)

## (undated) DEVICE — SET EXTENSION WITH 2 PORTS (48EA/CA) ***PART #2C8610 IS A SUBSTITUTE*****

## (undated) DEVICE — SUCTION INSTRUMENT YANKAUER BULBOUS TIP W/O VENT (50EA/CA)

## (undated) DEVICE — SET LEADWIRE 5 LEAD BEDSIDE DISPOSABLE ECG (1SET OF 5/EA)

## (undated) DEVICE — GOWN SURGEONS LARGE - (32/CA)

## (undated) DEVICE — GLOVE SZ 6 BIOGEL PI MICRO - PF LF (50PR/BX 4BX/CA)

## (undated) DEVICE — SYS DLV COST CLS RM TEMP - INJECTATE (CO-SET II) (10EA/CA)

## (undated) DEVICE — TRAY SURESTEP FOLEY TEMP SENSING 16FR (10EA/CA) ORDER  #18764 FOR TEMP FOLEY ONLY

## (undated) DEVICE — SPONGE XRAY 8X4 STERL. 12PL - (10EA/TY 80TY/CA)

## (undated) DEVICE — KIT ENDOHARVEST SYSTEM 8 - MUST ORDER 5 AT A TIME

## (undated) DEVICE — SYSTEM CHEST DRAIN ADULT/PEDS W/AUTO TRANSFUSION CAPABILITY SAHARA (6EA/CA)

## (undated) DEVICE — SODIUM CHL. INJ. 0.9% 500ML (24EA/CA 50CA/PF)

## (undated) DEVICE — CANISTER SUCTION 3000ML MECHANICAL FILTER AUTO SHUTOFF MEDI-VAC NONSTERILE LF DISP  (40EA/CA)

## (undated) DEVICE — SUTURE 6-0 PROLENE RB-2 D/A 30 (36PK/BX)"

## (undated) DEVICE — TOWELS CLOTH SURGICAL - (4/PK 20PK/CA)

## (undated) DEVICE — CONNECTOR HUBLESS DRAINAGE - ONE WAY (20/BX)

## (undated) DEVICE — BLADE BEAVER 6900 MINI SHARP ALL AROUND (20/CA)

## (undated) DEVICE — BAG RESUSCITATION DISPOSABLE - WITH MASK (10 EA/CA)

## (undated) DEVICE — TUBE E-T HI-LO CUFF 8.5MM (10EA/PK)

## (undated) DEVICE — BLADE SURGICAL #15 - (50/BX 3BX/CA)

## (undated) DEVICE — GLOVE BIOGEL PI ORTHO SZ 8 PF LF (40PR/BX)